# Patient Record
Sex: FEMALE | Race: WHITE | NOT HISPANIC OR LATINO | URBAN - METROPOLITAN AREA
[De-identification: names, ages, dates, MRNs, and addresses within clinical notes are randomized per-mention and may not be internally consistent; named-entity substitution may affect disease eponyms.]

---

## 2018-05-23 ENCOUNTER — OUTPATIENT (OUTPATIENT)
Dept: OUTPATIENT SERVICES | Facility: HOSPITAL | Age: 73
LOS: 1 days | End: 2018-05-23
Payer: MEDICARE

## 2018-05-23 ENCOUNTER — APPOINTMENT (OUTPATIENT)
Dept: ULTRASOUND IMAGING | Facility: IMAGING CENTER | Age: 73
End: 2018-05-23
Payer: MEDICARE

## 2018-05-23 ENCOUNTER — APPOINTMENT (OUTPATIENT)
Dept: MAMMOGRAPHY | Facility: IMAGING CENTER | Age: 73
End: 2018-05-23
Payer: MEDICARE

## 2018-05-23 DIAGNOSIS — Z00.00 ENCOUNTER FOR GENERAL ADULT MEDICAL EXAMINATION WITHOUT ABNORMAL FINDINGS: ICD-10-CM

## 2018-05-23 PROCEDURE — G0279: CPT

## 2018-05-23 PROCEDURE — 77066 DX MAMMO INCL CAD BI: CPT | Mod: 26

## 2018-05-23 PROCEDURE — 76641 ULTRASOUND BREAST COMPLETE: CPT | Mod: 26,50

## 2018-05-23 PROCEDURE — 76641 ULTRASOUND BREAST COMPLETE: CPT

## 2018-05-23 PROCEDURE — 77066 DX MAMMO INCL CAD BI: CPT

## 2018-05-23 PROCEDURE — G0279: CPT | Mod: 26

## 2018-08-10 ENCOUNTER — APPOINTMENT (OUTPATIENT)
Dept: ORTHOPEDIC SURGERY | Facility: CLINIC | Age: 73
End: 2018-08-10
Payer: MEDICARE

## 2018-08-10 VITALS
BODY MASS INDEX: 28.89 KG/M2 | SYSTOLIC BLOOD PRESSURE: 104 MMHG | HEIGHT: 61.5 IN | WEIGHT: 155 LBS | HEART RATE: 88 BPM | DIASTOLIC BLOOD PRESSURE: 69 MMHG

## 2018-08-10 DIAGNOSIS — Z87.39 PERSONAL HISTORY OF OTHER DISEASES OF THE MUSCULOSKELETAL SYSTEM AND CONNECTIVE TISSUE: ICD-10-CM

## 2018-08-10 PROCEDURE — 99204 OFFICE O/P NEW MOD 45 MIN: CPT | Mod: 25

## 2018-08-10 PROCEDURE — 73562 X-RAY EXAM OF KNEE 3: CPT | Mod: RT

## 2018-08-10 PROCEDURE — 20610 DRAIN/INJ JOINT/BURSA W/O US: CPT | Mod: RT

## 2018-09-04 ENCOUNTER — APPOINTMENT (OUTPATIENT)
Dept: ORTHOPEDIC SURGERY | Facility: CLINIC | Age: 73
End: 2018-09-04
Payer: MEDICARE

## 2018-09-04 VITALS — BODY MASS INDEX: 28.89 KG/M2 | WEIGHT: 155 LBS | HEIGHT: 61.5 IN

## 2018-09-04 PROCEDURE — 73522 X-RAY EXAM HIPS BI 3-4 VIEWS: CPT

## 2018-09-04 PROCEDURE — 99215 OFFICE O/P EST HI 40 MIN: CPT

## 2018-09-20 ENCOUNTER — APPOINTMENT (OUTPATIENT)
Dept: ORTHOPEDIC SURGERY | Facility: CLINIC | Age: 73
End: 2018-09-20
Payer: MEDICARE

## 2018-09-20 VITALS
DIASTOLIC BLOOD PRESSURE: 79 MMHG | BODY MASS INDEX: 29.27 KG/M2 | SYSTOLIC BLOOD PRESSURE: 122 MMHG | WEIGHT: 155 LBS | HEART RATE: 105 BPM | HEIGHT: 61 IN

## 2018-09-20 DIAGNOSIS — M54.5 LOW BACK PAIN: ICD-10-CM

## 2018-09-20 PROCEDURE — 99214 OFFICE O/P EST MOD 30 MIN: CPT

## 2018-09-20 PROCEDURE — 72100 X-RAY EXAM L-S SPINE 2/3 VWS: CPT

## 2018-10-04 ENCOUNTER — APPOINTMENT (OUTPATIENT)
Dept: ORTHOPEDIC SURGERY | Facility: CLINIC | Age: 73
End: 2018-10-04
Payer: MEDICARE

## 2018-10-04 VITALS
DIASTOLIC BLOOD PRESSURE: 80 MMHG | BODY MASS INDEX: 28.51 KG/M2 | SYSTOLIC BLOOD PRESSURE: 127 MMHG | HEIGHT: 61 IN | HEART RATE: 81 BPM | WEIGHT: 151 LBS

## 2018-10-04 DIAGNOSIS — M16.12 UNILATERAL PRIMARY OSTEOARTHRITIS, LEFT HIP: ICD-10-CM

## 2018-10-04 PROCEDURE — 99214 OFFICE O/P EST MOD 30 MIN: CPT

## 2018-10-05 PROBLEM — M16.12 PRIMARY OSTEOARTHRITIS OF LEFT HIP: Status: ACTIVE | Noted: 2018-09-04

## 2018-10-08 ENCOUNTER — OUTPATIENT (OUTPATIENT)
Dept: OUTPATIENT SERVICES | Facility: HOSPITAL | Age: 73
LOS: 1 days | End: 2018-10-08
Payer: MEDICARE

## 2018-10-08 VITALS
RESPIRATION RATE: 14 BRPM | HEIGHT: 62 IN | OXYGEN SATURATION: 96 % | WEIGHT: 154.1 LBS | HEART RATE: 99 BPM | SYSTOLIC BLOOD PRESSURE: 125 MMHG | TEMPERATURE: 99 F | DIASTOLIC BLOOD PRESSURE: 77 MMHG

## 2018-10-08 DIAGNOSIS — M16.12 UNILATERAL PRIMARY OSTEOARTHRITIS, LEFT HIP: ICD-10-CM

## 2018-10-08 DIAGNOSIS — Z96.641 PRESENCE OF RIGHT ARTIFICIAL HIP JOINT: Chronic | ICD-10-CM

## 2018-10-08 DIAGNOSIS — G47.33 OBSTRUCTIVE SLEEP APNEA (ADULT) (PEDIATRIC): ICD-10-CM

## 2018-10-08 DIAGNOSIS — Z29.9 ENCOUNTER FOR PROPHYLACTIC MEASURES, UNSPECIFIED: ICD-10-CM

## 2018-10-08 DIAGNOSIS — Z01.818 ENCOUNTER FOR OTHER PREPROCEDURAL EXAMINATION: ICD-10-CM

## 2018-10-08 DIAGNOSIS — Z98.890 OTHER SPECIFIED POSTPROCEDURAL STATES: Chronic | ICD-10-CM

## 2018-10-08 DIAGNOSIS — Z90.49 ACQUIRED ABSENCE OF OTHER SPECIFIED PARTS OF DIGESTIVE TRACT: Chronic | ICD-10-CM

## 2018-10-08 LAB
ANION GAP SERPL CALC-SCNC: 11 MMOL/L — SIGNIFICANT CHANGE UP (ref 5–17)
BLD GP AB SCN SERPL QL: NEGATIVE — SIGNIFICANT CHANGE UP
BUN SERPL-MCNC: 9 MG/DL — SIGNIFICANT CHANGE UP (ref 7–23)
CALCIUM SERPL-MCNC: 9.3 MG/DL — SIGNIFICANT CHANGE UP (ref 8.4–10.5)
CHLORIDE SERPL-SCNC: 104 MMOL/L — SIGNIFICANT CHANGE UP (ref 96–108)
CO2 SERPL-SCNC: 26 MMOL/L — SIGNIFICANT CHANGE UP (ref 22–31)
CREAT SERPL-MCNC: 0.76 MG/DL — SIGNIFICANT CHANGE UP (ref 0.5–1.3)
GLUCOSE SERPL-MCNC: 92 MG/DL — SIGNIFICANT CHANGE UP (ref 70–99)
HCT VFR BLD CALC: 40.1 % — SIGNIFICANT CHANGE UP (ref 34.5–45)
HGB BLD-MCNC: 12.2 G/DL — SIGNIFICANT CHANGE UP (ref 11.5–15.5)
MCHC RBC-ENTMCNC: 29.1 PG — SIGNIFICANT CHANGE UP (ref 27–34)
MCHC RBC-ENTMCNC: 30.4 GM/DL — LOW (ref 32–36)
MCV RBC AUTO: 95.7 FL — SIGNIFICANT CHANGE UP (ref 80–100)
PLATELET # BLD AUTO: 385 K/UL — SIGNIFICANT CHANGE UP (ref 150–400)
POTASSIUM SERPL-MCNC: 3.8 MMOL/L — SIGNIFICANT CHANGE UP (ref 3.5–5.3)
POTASSIUM SERPL-SCNC: 3.8 MMOL/L — SIGNIFICANT CHANGE UP (ref 3.5–5.3)
RBC # BLD: 4.19 M/UL — SIGNIFICANT CHANGE UP (ref 3.8–5.2)
RBC # FLD: 13.9 % — SIGNIFICANT CHANGE UP (ref 10.3–14.5)
RH IG SCN BLD-IMP: POSITIVE — SIGNIFICANT CHANGE UP
SODIUM SERPL-SCNC: 141 MMOL/L — SIGNIFICANT CHANGE UP (ref 135–145)
WBC # BLD: 10.87 K/UL — HIGH (ref 3.8–10.5)
WBC # FLD AUTO: 10.87 K/UL — HIGH (ref 3.8–10.5)

## 2018-10-08 PROCEDURE — 86901 BLOOD TYPING SEROLOGIC RH(D): CPT

## 2018-10-08 PROCEDURE — 85027 COMPLETE CBC AUTOMATED: CPT

## 2018-10-08 PROCEDURE — 86850 RBC ANTIBODY SCREEN: CPT

## 2018-10-08 PROCEDURE — 87641 MR-STAPH DNA AMP PROBE: CPT

## 2018-10-08 PROCEDURE — 86900 BLOOD TYPING SEROLOGIC ABO: CPT

## 2018-10-08 PROCEDURE — 83036 HEMOGLOBIN GLYCOSYLATED A1C: CPT

## 2018-10-08 PROCEDURE — G0463: CPT

## 2018-10-08 PROCEDURE — 80048 BASIC METABOLIC PNL TOTAL CA: CPT

## 2018-10-08 PROCEDURE — 87640 STAPH A DNA AMP PROBE: CPT

## 2018-10-08 RX ORDER — SODIUM CHLORIDE 9 MG/ML
3 INJECTION INTRAMUSCULAR; INTRAVENOUS; SUBCUTANEOUS EVERY 8 HOURS
Qty: 0 | Refills: 0 | Status: DISCONTINUED | OUTPATIENT
Start: 2018-10-16 | End: 2018-10-16

## 2018-10-08 RX ORDER — ACETAMINOPHEN 500 MG
975 TABLET ORAL ONCE
Qty: 0 | Refills: 0 | Status: DISCONTINUED | OUTPATIENT
Start: 2018-10-16 | End: 2018-10-16

## 2018-10-08 RX ORDER — PANTOPRAZOLE SODIUM 20 MG/1
40 TABLET, DELAYED RELEASE ORAL ONCE
Qty: 0 | Refills: 0 | Status: DISCONTINUED | OUTPATIENT
Start: 2018-10-16 | End: 2018-10-16

## 2018-10-08 RX ORDER — TRAMADOL HYDROCHLORIDE 50 MG/1
50 TABLET ORAL ONCE
Qty: 0 | Refills: 0 | Status: COMPLETED | OUTPATIENT
Start: 2018-10-16 | End: 2018-10-16

## 2018-10-08 RX ORDER — LIDOCAINE HCL 20 MG/ML
0.2 VIAL (ML) INJECTION ONCE
Qty: 0 | Refills: 0 | Status: DISCONTINUED | OUTPATIENT
Start: 2018-10-16 | End: 2018-10-16

## 2018-10-08 RX ORDER — CEFAZOLIN SODIUM 1 G
2000 VIAL (EA) INJECTION ONCE
Qty: 0 | Refills: 0 | Status: DISCONTINUED | OUTPATIENT
Start: 2018-10-16 | End: 2018-10-16

## 2018-10-08 RX ORDER — GABAPENTIN 400 MG/1
100 CAPSULE ORAL ONCE
Qty: 0 | Refills: 0 | Status: DISCONTINUED | OUTPATIENT
Start: 2018-10-16 | End: 2018-10-16

## 2018-10-08 NOTE — H&P PST ADULT - NSANTHOSAYNRD_GEN_A_CORE
No. BHAKTI screening performed.  STOP BANG Legend: 0-2 = LOW Risk; 3-4 = INTERMEDIATE Risk; 5-8 = HIGH Risk Yes

## 2018-10-08 NOTE — H&P PST ADULT - NEUROLOGICAL COMMENTS
chronic neck pain with radiculopathy to BUE, left worse than right, numbness/tingling.  also c/o chronic low back pain, hips pain, see ROS

## 2018-10-08 NOTE — H&P PST ADULT - ANESTHESIA, PREVIOUS REACTION, PROFILE
Abdomen soft, non-tender and non-distended without organomegaly or masses. Normal bowel sounds. none "codeine drops my blood pressure"

## 2018-10-08 NOTE — H&P PST ADULT - ACTIVITY
prior to August, pt able to walk 3 blocks at a time, able to walk up 1 flight of stairs, activity level limited by back, neck, hips and knees pain

## 2018-10-08 NOTE — H&P PST ADULT - PMH
Cataract    GERD (gastroesophageal reflux disease)    Hiatal hernia    History of bunionectomy of both great toes    Hypothyroid    BHAKTI (obstructive sleep apnea)  wears mouth guard Cataract    Cervical spinal stenosis    GERD (gastroesophageal reflux disease)    H/O bronchitis    Hiatal hernia    History of bunionectomy of both great toes    Hypothyroid    Low back pain  chronic  BHAKTI (obstructive sleep apnea)  wears mouth guard  Seasonal allergies

## 2018-10-08 NOTE — H&P PST ADULT - SYMPTOMS
none dyspnea/chronic LU when walking up more than 1 flight of stairs chronic LU when walking up more than 1 flight of stairs, especially during allergies season, pt to see PCP for preop evaluation on Oct 9th/dyspnea

## 2018-10-08 NOTE — H&P PST ADULT - MUSCULOSKELETAL COMMENTS
see HPI gait steady see HPI, ROS, pt also c/o bilateral knees osteoarthritis- gets yearly GABY injection to right knee

## 2018-10-08 NOTE — H&P PST ADULT - ASSESSMENT

## 2018-10-08 NOTE — H&P PST ADULT - HISTORY OF PRESENT ILLNESS
Patient presents to office for some answers related to upcoming left hip replacement   Patient lecturer, Hx right hip replacement x 3 years ago by , presents to office walking on her own complaining of b/l hip pain, R>L x 6 months. insidious onset, progressively worsening over time.   Pain indicated to posterior aspect of hip around previous incision area, radiating to groin, 7/10, constant, achy and sharp at time. Reports weakness, buckling. Worsening with standing, prolonged walking. Some relief with Nsaids. Patient denies any fever, chills, swelling, trauma to area, erythema, hematomas, numbness or tingling sensation. 72 yo female..  Patient presents to office for some answers related to upcoming left hip replacement   Patient lecturer, Hx right hip replacement x 3 years ago by , presents to office walking on her own complaining of b/l hip pain, R>L x 6 months. insidious onset, progressively worsening over time.   Pain indicated to posterior aspect of hip around previous incision area, radiating to groin, 7/10, constant, achy and sharp at time. Reports weakness, buckling. Worsening with standing, prolonged walking. Some relief with Nsaids. Patient denies any fever, chills, swelling, trauma to area, erythema, hematomas, numbness or tingling sensation. 72 yo female. h/o BHAKTI (wears mouth guard), hypothyroid, s/p right THR 2015. presents to office walking on her own complaining of b/l hip pain, R>L x 6 months, symptoms worsened significantly in the past 2 months. insidious onset, progressively worsening over time.   Pain indicated to posterior aspect of hip around previous incision area, radiating to groin, 7/10, constant, achy and sharp at time. Reports weakness, buckling. Worsening with standing, prolonged walking. Some relief with Nsaids. evaluated by Dr. Mckenzie, diagnosed with left hip osteoarthritis. now presents to PST scheduled for left anterior THR surgery.

## 2018-10-09 LAB
HBA1C BLD-MCNC: 5.9 % — HIGH (ref 4–5.6)
MRSA PCR RESULT.: SIGNIFICANT CHANGE UP
S AUREUS DNA NOSE QL NAA+PROBE: SIGNIFICANT CHANGE UP

## 2018-10-16 ENCOUNTER — OUTPATIENT (OUTPATIENT)
Dept: INPATIENT UNIT | Facility: HOSPITAL | Age: 73
LOS: 1 days | End: 2018-10-16
Payer: MEDICARE

## 2018-10-16 VITALS
TEMPERATURE: 98 F | HEART RATE: 82 BPM | DIASTOLIC BLOOD PRESSURE: 80 MMHG | SYSTOLIC BLOOD PRESSURE: 126 MMHG | OXYGEN SATURATION: 97 % | WEIGHT: 154.1 LBS | RESPIRATION RATE: 18 BRPM | HEIGHT: 62 IN

## 2018-10-16 DIAGNOSIS — M16.12 UNILATERAL PRIMARY OSTEOARTHRITIS, LEFT HIP: ICD-10-CM

## 2018-10-16 DIAGNOSIS — Z98.890 OTHER SPECIFIED POSTPROCEDURAL STATES: Chronic | ICD-10-CM

## 2018-10-16 DIAGNOSIS — Z90.49 ACQUIRED ABSENCE OF OTHER SPECIFIED PARTS OF DIGESTIVE TRACT: Chronic | ICD-10-CM

## 2018-10-16 DIAGNOSIS — Z96.641 PRESENCE OF RIGHT ARTIFICIAL HIP JOINT: Chronic | ICD-10-CM

## 2018-10-16 LAB
GLUCOSE BLDC GLUCOMTR-MCNC: 105 MG/DL — HIGH (ref 70–99)
HCT VFR BLD CALC: 39 % — SIGNIFICANT CHANGE UP (ref 34.5–45)
HGB BLD-MCNC: 12.9 G/DL — SIGNIFICANT CHANGE UP (ref 11.5–15.5)
MCHC RBC-ENTMCNC: 30.6 PG — SIGNIFICANT CHANGE UP (ref 27–34)
MCHC RBC-ENTMCNC: 33.1 GM/DL — SIGNIFICANT CHANGE UP (ref 32–36)
MCV RBC AUTO: 92.5 FL — SIGNIFICANT CHANGE UP (ref 80–100)
PLATELET # BLD AUTO: 388 K/UL — SIGNIFICANT CHANGE UP (ref 150–400)
RBC # BLD: 4.22 M/UL — SIGNIFICANT CHANGE UP (ref 3.8–5.2)
RBC # FLD: 12.8 % — SIGNIFICANT CHANGE UP (ref 10.3–14.5)
RH IG SCN BLD-IMP: POSITIVE — SIGNIFICANT CHANGE UP
WBC # BLD: 7.8 K/UL — SIGNIFICANT CHANGE UP (ref 3.8–10.5)
WBC # FLD AUTO: 7.8 K/UL — SIGNIFICANT CHANGE UP (ref 3.8–10.5)

## 2018-10-16 PROCEDURE — 86900 BLOOD TYPING SEROLOGIC ABO: CPT

## 2018-10-16 PROCEDURE — 82962 GLUCOSE BLOOD TEST: CPT

## 2018-10-16 PROCEDURE — 86901 BLOOD TYPING SEROLOGIC RH(D): CPT

## 2018-10-16 PROCEDURE — 85027 COMPLETE CBC AUTOMATED: CPT

## 2018-10-16 NOTE — PROGRESS NOTE ADULT - SUBJECTIVE AND OBJECTIVE BOX
Patient reports a recent cough, no fever, in SDA. Per anesthesia, case is cancelled and will be rescheduled at a later date.

## 2018-10-31 ENCOUNTER — APPOINTMENT (OUTPATIENT)
Dept: ORTHOPEDIC SURGERY | Facility: HOSPITAL | Age: 73
End: 2018-10-31

## 2019-05-09 PROBLEM — K21.9 GASTRO-ESOPHAGEAL REFLUX DISEASE WITHOUT ESOPHAGITIS: Chronic | Status: ACTIVE | Noted: 2018-10-08

## 2019-05-09 PROBLEM — E03.9 HYPOTHYROIDISM, UNSPECIFIED: Chronic | Status: ACTIVE | Noted: 2018-10-08

## 2019-05-09 PROBLEM — J30.2 OTHER SEASONAL ALLERGIC RHINITIS: Chronic | Status: ACTIVE | Noted: 2018-10-08

## 2019-05-09 PROBLEM — Z98.890 OTHER SPECIFIED POSTPROCEDURAL STATES: Chronic | Status: ACTIVE | Noted: 2018-10-08

## 2019-05-09 PROBLEM — K44.9 DIAPHRAGMATIC HERNIA WITHOUT OBSTRUCTION OR GANGRENE: Chronic | Status: ACTIVE | Noted: 2018-10-08

## 2019-05-09 PROBLEM — H26.9 UNSPECIFIED CATARACT: Chronic | Status: ACTIVE | Noted: 2018-10-08

## 2019-05-09 PROBLEM — M54.5 LOW BACK PAIN: Chronic | Status: ACTIVE | Noted: 2018-10-08

## 2019-05-09 PROBLEM — G47.33 OBSTRUCTIVE SLEEP APNEA (ADULT) (PEDIATRIC): Chronic | Status: ACTIVE | Noted: 2018-10-08

## 2019-05-09 PROBLEM — M48.02 SPINAL STENOSIS, CERVICAL REGION: Chronic | Status: ACTIVE | Noted: 2018-10-08

## 2019-06-12 ENCOUNTER — OUTPATIENT (OUTPATIENT)
Dept: OUTPATIENT SERVICES | Facility: HOSPITAL | Age: 74
LOS: 1 days | End: 2019-06-12
Payer: MEDICARE

## 2019-06-12 ENCOUNTER — APPOINTMENT (OUTPATIENT)
Dept: ULTRASOUND IMAGING | Facility: IMAGING CENTER | Age: 74
End: 2019-06-12
Payer: MEDICARE

## 2019-06-12 ENCOUNTER — APPOINTMENT (OUTPATIENT)
Dept: MAMMOGRAPHY | Facility: IMAGING CENTER | Age: 74
End: 2019-06-12
Payer: MEDICARE

## 2019-06-12 DIAGNOSIS — Z96.641 PRESENCE OF RIGHT ARTIFICIAL HIP JOINT: Chronic | ICD-10-CM

## 2019-06-12 DIAGNOSIS — Z00.8 ENCOUNTER FOR OTHER GENERAL EXAMINATION: ICD-10-CM

## 2019-06-12 DIAGNOSIS — Z90.49 ACQUIRED ABSENCE OF OTHER SPECIFIED PARTS OF DIGESTIVE TRACT: Chronic | ICD-10-CM

## 2019-06-12 DIAGNOSIS — Z98.890 OTHER SPECIFIED POSTPROCEDURAL STATES: Chronic | ICD-10-CM

## 2019-06-12 PROCEDURE — 77063 BREAST TOMOSYNTHESIS BI: CPT | Mod: 26

## 2019-06-12 PROCEDURE — 77063 BREAST TOMOSYNTHESIS BI: CPT

## 2019-06-12 PROCEDURE — 77067 SCR MAMMO BI INCL CAD: CPT | Mod: 26

## 2019-06-12 PROCEDURE — 77067 SCR MAMMO BI INCL CAD: CPT

## 2019-06-20 ENCOUNTER — OTHER (OUTPATIENT)
Age: 74
End: 2019-06-20

## 2019-08-27 ENCOUNTER — APPOINTMENT (OUTPATIENT)
Dept: ORTHOPEDIC SURGERY | Facility: CLINIC | Age: 74
End: 2019-08-27
Payer: MEDICARE

## 2019-08-27 VITALS
WEIGHT: 150 LBS | SYSTOLIC BLOOD PRESSURE: 104 MMHG | DIASTOLIC BLOOD PRESSURE: 61 MMHG | HEART RATE: 102 BPM | HEIGHT: 61 IN | BODY MASS INDEX: 28.32 KG/M2

## 2019-08-27 DIAGNOSIS — Z96.641 PRESENCE OF RIGHT ARTIFICIAL HIP JOINT: ICD-10-CM

## 2019-08-27 DIAGNOSIS — M16.12 UNILATERAL PRIMARY OSTEOARTHRITIS, LEFT HIP: ICD-10-CM

## 2019-08-27 PROCEDURE — 20610 DRAIN/INJ JOINT/BURSA W/O US: CPT | Mod: RT

## 2019-08-27 PROCEDURE — 99214 OFFICE O/P EST MOD 30 MIN: CPT | Mod: 25

## 2019-08-27 NOTE — DISCUSSION/SUMMARY
[de-identified] : The patient at this time is having more pain in her right hip due to trochanteric bursitis she tolerated the injection well and appeared to be greatly improved.  In the future she may want to have her left hip replaced when she was going for the hip replacement she had bronchitis and it was canceled she will again discuss this with Dr. Catalan or I will see her because she wants conservative measures at this time in 2 months.

## 2019-08-27 NOTE — HISTORY OF PRESENT ILLNESS
[de-identified] : Ms. LUCY MCDANIEL is a 74 year female who presents to office complaining of bilateral knee and bilateral hip pain.\par H/o right THR by Dr. De La Cruz in 2014.\par Pain in her hips and knees are constant. They are dull/achy in nature.\par Exacerbating factors include standing and walking.\par Relieving factors include Motrin or Tylenol or Advil or Aleve.\par Denies buckling, locking, numbness, tingling, etc.\par Methods tried in past such as PT, injections, etc. haven't helped.\par All review of systems not previously stated as positive are negative.

## 2019-08-27 NOTE — PHYSICAL EXAM
[de-identified] : Constitutional - the patient is of normal build and nutrition.  The patient remains oriented to person, time, and  place.  Mood is normal. Vital signs as recorded.  The patients gait is with pain in her right and left hips but at this time she is having more pain on the right than the left.  Right hip previous total hip replacement by Dr. De La Cruz.  At the place of her pain however is directly over her greater trochanter consistent with trochanteric bursitis.. The patient has satisfactory  balance and can stand on toes and heels.\par \par The patient has no difficulty with respiration. Respiration at rest is a normal rate. The patient is not short of breath and has not become short of breath with short ambulation. There is no audible wheezing. No coughing.\par \par Skin is normal for the patient's age. There are no abnormal masses or lymph nodes which stand out in the lower extremities.\par \par Spine - deep tendon reflexes are symmetric\par \par \par UPPER EXTREMITIES \par \par Shoulders ROM  is symmetric  and the motion is satisfactory.  There is no significant shoulder pain or limitation in motion which would make using a cane or a walker difficult. Shoulder stability and  strength are satisfactory.\par \par Circulation appears satisfactory with pedal pulses present.  There is no major edema in the lower legs. No skin tenderness or increased temperature. No major varicosities.\par \par HIP EXAMINATION the abduction and abduction as well as rotation measurements were taken with the hip in flexion.\par \par Motion\par Is flexion in the right hip 130 left hip 115, abduction right hip 65 left hip 25, adduction right hip 20 left hip 0, external rotation right hip 60 left hip 20, internal rotation right hip 10 left hip 0.  She does discomfort in her left hip with motion.  Her pain however moves more on the right at the time directly over her trochanteric bursa.\par \par There is no crepitus in either hip. The alignment of the hips is normal.\par \par \par KNEE EXAMINATION\par \par Both knees her right knee is a valgus she goes from\par \par Motion\par Right Knee      from 0 to 130 degrees she has a valgus alignment she has some discomfort and over the lateral joint she has some patellofemoral crepitus she does not have a major effusion and she at this time does not have a significant Baker's cyst.\par \par Her left knee goes from 0 to 130 degrees it is of normal alignment she has good medial lateral and anterior posterior stability she does get some diffuse aching in her knee she has no difficult problem but a dull aching at this time.\par \par \par Ankle and foot examination\par Of the ankle has normal motion.  There is normal ankle stability.  The patient has no major abnormalities of the foot.\par \par \par \par  [de-identified] : And her previous x-rays she had an AP of the pelvis and lateral of her left hip her right left hip showed medial type arthritis medial joint narrowing subchondral sclerosis and what appear to be mild cam impingement her right hip is a total hip replacement which is all porous in good position no calcium is over the trochanter but this does not rule out trochanteric bursitis.\par \par X-ray of her right knee done and shows narrowing the lateral joint line the lateral arthritis.  A general valgus knee.

## 2019-08-27 NOTE — PROCEDURE
[de-identified] : Procedure Note: \par \par Anatomic Location: right  hip trochanteric bursitis\par \par Diagnosis:  hip trochanteric bursitis\par \par Procedure:  Injection of 6ccs of Marcaine 0.5% plain and Depo-Medrol 1cc (40 MG)\par \par Local Spray: Ethyl Chloride.\par \par Skin preparation with alcohol.\par \par Patient has consented for the procedure.\par \par Injection 22-gauge spinal needle  through an anterior  lateral approach.\par \par Patient tolerated the procedure well.\par \par Patient instructed to call the office if any reaction, fever, chills, increased erythema or swelling.   [phone].

## 2019-10-29 ENCOUNTER — INPATIENT (INPATIENT)
Facility: HOSPITAL | Age: 74
LOS: 2 days | Discharge: HOME | DRG: 812 | End: 2019-11-01
Attending: INTERNAL MEDICINE | Admitting: HOSPITALIST
Payer: MEDICARE

## 2019-10-29 VITALS
HEART RATE: 106 BPM | RESPIRATION RATE: 18 BRPM | DIASTOLIC BLOOD PRESSURE: 77 MMHG | WEIGHT: 154.98 LBS | OXYGEN SATURATION: 99 % | TEMPERATURE: 98 F | HEIGHT: 61.5 IN | SYSTOLIC BLOOD PRESSURE: 119 MMHG

## 2019-10-29 DIAGNOSIS — Z90.49 ACQUIRED ABSENCE OF OTHER SPECIFIED PARTS OF DIGESTIVE TRACT: Chronic | ICD-10-CM

## 2019-10-29 DIAGNOSIS — Z98.890 OTHER SPECIFIED POSTPROCEDURAL STATES: Chronic | ICD-10-CM

## 2019-10-29 DIAGNOSIS — Z96.641 PRESENCE OF RIGHT ARTIFICIAL HIP JOINT: Chronic | ICD-10-CM

## 2019-10-29 DIAGNOSIS — D64.9 ANEMIA, UNSPECIFIED: ICD-10-CM

## 2019-10-29 LAB
ALBUMIN SERPL ELPH-MCNC: 4.1 G/DL — SIGNIFICANT CHANGE UP (ref 3.3–5)
ALP SERPL-CCNC: 48 U/L — SIGNIFICANT CHANGE UP (ref 40–120)
ALT FLD-CCNC: 10 U/L — SIGNIFICANT CHANGE UP (ref 10–45)
ANION GAP SERPL CALC-SCNC: 10 MMOL/L — SIGNIFICANT CHANGE UP (ref 5–17)
APTT BLD: 25.3 SEC — LOW (ref 27.5–36.3)
AST SERPL-CCNC: 10 U/L — SIGNIFICANT CHANGE UP (ref 10–40)
BASE EXCESS BLDV CALC-SCNC: 3.1 MMOL/L — HIGH (ref -2–2)
BASOPHILS # BLD AUTO: 0.06 K/UL — SIGNIFICANT CHANGE UP (ref 0–0.2)
BASOPHILS NFR BLD AUTO: 0.6 % — SIGNIFICANT CHANGE UP (ref 0–2)
BILIRUB SERPL-MCNC: 0.1 MG/DL — LOW (ref 0.2–1.2)
BLD GP AB SCN SERPL QL: NEGATIVE — SIGNIFICANT CHANGE UP
BUN SERPL-MCNC: 19 MG/DL — SIGNIFICANT CHANGE UP (ref 7–23)
CA-I SERPL-SCNC: 1.15 MMOL/L — SIGNIFICANT CHANGE UP (ref 1.12–1.3)
CALCIUM SERPL-MCNC: 9.2 MG/DL — SIGNIFICANT CHANGE UP (ref 8.4–10.5)
CHLORIDE BLDV-SCNC: 107 MMOL/L — SIGNIFICANT CHANGE UP (ref 96–108)
CHLORIDE SERPL-SCNC: 103 MMOL/L — SIGNIFICANT CHANGE UP (ref 96–108)
CO2 BLDV-SCNC: 29 MMOL/L — SIGNIFICANT CHANGE UP (ref 22–30)
CO2 SERPL-SCNC: 27 MMOL/L — SIGNIFICANT CHANGE UP (ref 22–31)
CREAT SERPL-MCNC: 0.81 MG/DL — SIGNIFICANT CHANGE UP (ref 0.5–1.3)
EOSINOPHIL # BLD AUTO: 0.09 K/UL — SIGNIFICANT CHANGE UP (ref 0–0.5)
EOSINOPHIL NFR BLD AUTO: 0.9 % — SIGNIFICANT CHANGE UP (ref 0–6)
GAS PNL BLDV: 136 MMOL/L — SIGNIFICANT CHANGE UP (ref 135–145)
GAS PNL BLDV: SIGNIFICANT CHANGE UP
GAS PNL BLDV: SIGNIFICANT CHANGE UP
GLUCOSE BLDV-MCNC: 101 MG/DL — HIGH (ref 70–99)
GLUCOSE SERPL-MCNC: 111 MG/DL — HIGH (ref 70–99)
HCO3 BLDV-SCNC: 28 MMOL/L — SIGNIFICANT CHANGE UP (ref 21–29)
HCT VFR BLD CALC: 23.9 % — LOW (ref 34.5–45)
HCT VFR BLDA CALC: 22 % — CRITICAL LOW (ref 39–50)
HGB BLD CALC-MCNC: 6.9 G/DL — CRITICAL LOW (ref 11.5–15.5)
HGB BLD-MCNC: 6.8 G/DL — CRITICAL LOW (ref 11.5–15.5)
IMM GRANULOCYTES NFR BLD AUTO: 0.6 % — SIGNIFICANT CHANGE UP (ref 0–1.5)
INR BLD: 0.99 RATIO — SIGNIFICANT CHANGE UP (ref 0.88–1.16)
LACTATE BLDV-MCNC: 1.9 MMOL/L — SIGNIFICANT CHANGE UP (ref 0.7–2)
LYMPHOCYTES # BLD AUTO: 2.91 K/UL — SIGNIFICANT CHANGE UP (ref 1–3.3)
LYMPHOCYTES # BLD AUTO: 27.8 % — SIGNIFICANT CHANGE UP (ref 13–44)
MANUAL SMEAR VERIFICATION: SIGNIFICANT CHANGE UP
MCHC RBC-ENTMCNC: 20.9 PG — LOW (ref 27–34)
MCHC RBC-ENTMCNC: 28.5 GM/DL — LOW (ref 32–36)
MCV RBC AUTO: 73.3 FL — LOW (ref 80–100)
MONOCYTES # BLD AUTO: 0.79 K/UL — SIGNIFICANT CHANGE UP (ref 0–0.9)
MONOCYTES NFR BLD AUTO: 7.6 % — SIGNIFICANT CHANGE UP (ref 2–14)
NEUTROPHILS # BLD AUTO: 6.55 K/UL — SIGNIFICANT CHANGE UP (ref 1.8–7.4)
NEUTROPHILS NFR BLD AUTO: 62.5 % — SIGNIFICANT CHANGE UP (ref 43–77)
NRBC # BLD: 0 /100 WBCS — SIGNIFICANT CHANGE UP (ref 0–0)
OB PNL STL: NEGATIVE — SIGNIFICANT CHANGE UP
PCO2 BLDV: 47 MMHG — SIGNIFICANT CHANGE UP (ref 35–50)
PH BLDV: 7.39 — SIGNIFICANT CHANGE UP (ref 7.35–7.45)
PLAT MORPH BLD: NORMAL — SIGNIFICANT CHANGE UP
PLATELET # BLD AUTO: 479 K/UL — HIGH (ref 150–400)
PO2 BLDV: 21 MMHG — LOW (ref 25–45)
POTASSIUM BLDV-SCNC: 3.7 MMOL/L — SIGNIFICANT CHANGE UP (ref 3.5–5.3)
POTASSIUM SERPL-MCNC: 4 MMOL/L — SIGNIFICANT CHANGE UP (ref 3.5–5.3)
POTASSIUM SERPL-SCNC: 4 MMOL/L — SIGNIFICANT CHANGE UP (ref 3.5–5.3)
PROT SERPL-MCNC: 6.8 G/DL — SIGNIFICANT CHANGE UP (ref 6–8.3)
PROTHROM AB SERPL-ACNC: 11.3 SEC — SIGNIFICANT CHANGE UP (ref 10–12.9)
RBC # BLD: 3.26 M/UL — LOW (ref 3.8–5.2)
RBC # FLD: 18.2 % — HIGH (ref 10.3–14.5)
RBC BLD AUTO: SIGNIFICANT CHANGE UP
RH IG SCN BLD-IMP: POSITIVE — SIGNIFICANT CHANGE UP
SAO2 % BLDV: 24 % — LOW (ref 67–88)
SODIUM SERPL-SCNC: 140 MMOL/L — SIGNIFICANT CHANGE UP (ref 135–145)
WBC # BLD: 10.46 K/UL — SIGNIFICANT CHANGE UP (ref 3.8–10.5)
WBC # FLD AUTO: 10.46 K/UL — SIGNIFICANT CHANGE UP (ref 3.8–10.5)

## 2019-10-29 PROCEDURE — 99284 EMERGENCY DEPT VISIT MOD MDM: CPT | Mod: GC

## 2019-10-29 PROCEDURE — 71046 X-RAY EXAM CHEST 2 VIEWS: CPT | Mod: 26

## 2019-10-29 PROCEDURE — 99223 1ST HOSP IP/OBS HIGH 75: CPT

## 2019-10-29 PROCEDURE — 93010 ELECTROCARDIOGRAM REPORT: CPT

## 2019-10-29 NOTE — H&P ADULT - NSHPLABSRESULTS_GEN_ALL_CORE
Personally reviewed labs: notable for microcytic anemia Personally reviewed labs: notable for microcytic anemia    Personally reviewed CXR: no appreciable focal consolidations or masses    EKG Personally reviewed labs: notable for microcytic anemia    Personally reviewed CXR: no appreciable focal consolidations or masses    Personally reviewed EKG: SR 90 bpm QTc 437 no ST segment changes or ST segment changes

## 2019-10-29 NOTE — ED PROVIDER NOTE - CLINICAL SUMMARY MEDICAL DECISION MAKING FREE TEXT BOX
74 years old F with sx of symptomatic anemia and low Hb/hct. Negative GI w/u recently, will obtain bloodwork T/S, transfuse admission.ZR

## 2019-10-29 NOTE — H&P ADULT - NSICDXPASTMEDICALHX_GEN_ALL_CORE_FT
PAST MEDICAL HISTORY:  Cataract     Cervical spinal stenosis     GERD (gastroesophageal reflux disease)     H/O bronchitis     Hiatal hernia     History of bunionectomy of both great toes     Hypothyroid     Low back pain chronic    BHAKTI (obstructive sleep apnea) wears mouth guard    Seasonal allergies

## 2019-10-29 NOTE — ED PROVIDER NOTE - PMH
Cataract    Cervical spinal stenosis    GERD (gastroesophageal reflux disease)    H/O bronchitis    Hiatal hernia    History of bunionectomy of both great toes    Hypothyroid    Low back pain  chronic  BHAKTI (obstructive sleep apnea)  wears mouth guard  Seasonal allergies

## 2019-10-29 NOTE — H&P ADULT - NSHPPHYSICALEXAM_GEN_ALL_CORE
Vital Signs Last 24 Hrs  T(C): 36.8 (29 Oct 2019 19:31), Max: 36.8 (29 Oct 2019 19:31)  T(F): 98.3 (29 Oct 2019 19:31), Max: 98.3 (29 Oct 2019 19:31)  HR: 106 (29 Oct 2019 19:31) (106 - 106)  BP: 119/77 (29 Oct 2019 19:31) (119/77 - 119/77)  BP(mean): --  RR: 18 (29 Oct 2019 19:31) (18 - 18)  SpO2: 99% (29 Oct 2019 19:31) (99% - 99%) Vital Signs Last 24 Hrs  T(C): 36.8 (29 Oct 2019 19:31), Max: 36.8 (29 Oct 2019 19:31)  T(F): 98.3 (29 Oct 2019 19:31), Max: 98.3 (29 Oct 2019 19:31)  HR: 106 (29 Oct 2019 19:31) (106 - 106)  BP: 119/77 (29 Oct 2019 19:31) (119/77 - 119/77)  BP(mean): --  RR: 18 (29 Oct 2019 19:31) (18 - 18)  SpO2: 99% (29 Oct 2019 19:31) (99% - 99%)      PHYSICAL EXAM:  GENERAL: NAD, well-groomed, well-developed  HEAD:  Atraumatic, Normocephalic  EYES: EOMI, PERRLA, + conjunctival pallor. Sclera clear  ENMT: No tonsillar erythema, exudates, or enlargement; Moist mucous membranes, Good dentition, No lesions  NECK: Supple, No JVD  NERVOUS SYSTEM:  Alert & Oriented X3, Good concentration; Motor Strength 5/5 B/L upper and lower extremities  CHEST/LUNG: Clear to percussion bilaterally; No rales, rhonchi, or wheezing  HEART: Regular rate and rhythm +S1 S2; No murmurs, rubs, or gallops  ABDOMEN: Soft, Nontender, Nondistended; Bowel sounds present  EXTREMITIES:  2+ Peripheral Pulses, No clubbing, cyanosis, or edema  LYMPH: No lymphadenopathy noted  SKIN: Warm and dry. No rashes or lesions Vital Signs Last 24 Hrs  T(C): 36.8 (29 Oct 2019 19:31), Max: 36.8 (29 Oct 2019 19:31)  T(F): 98.3 (29 Oct 2019 19:31), Max: 98.3 (29 Oct 2019 19:31)  HR: 106 (29 Oct 2019 19:31) (106 - 106)  BP: 119/77 (29 Oct 2019 19:31) (119/77 - 119/77)  BP(mean): --  RR: 18 (29 Oct 2019 19:31) (18 - 18)  SpO2: 99% (29 Oct 2019 19:31) (99% - 99%)      PHYSICAL EXAM:  GENERAL: NAD, well-groomed, well-developed  HEAD:  Atraumatic, Normocephalic  EYES: EOMI, PERRLA, + conjunctival pallor. Sclera clear  ENMT: No tonsillar erythema, exudates, or enlargement; Moist mucous membranes, Good dentition, No lesions  NECK: Supple, No JVD  NERVOUS SYSTEM:  Alert & Oriented X3, Good concentration; Motor Strength 5/5 B/L upper and lower extremities  CHEST/LUNG: Clear to percussion bilaterally; No rales, rhonchi, or wheezing  HEART: Regular rate and rhythm +S1 S2; No murmurs, rubs, or gallops  ABDOMEN: Soft, Nontender, Nondistended; Bowel sounds present  EXTREMITIES:  2+ Peripheral Pulses, No clubbing, cyanosis, or edema  LYMPH: No lymphadenopathy noted

## 2019-10-29 NOTE — H&P ADULT - ATTENDING COMMENTS
Dr. Mundo Miranda accepted patient's case from the ED and requested in house hospitalist team to complete admission. Patient was previously unknown to me. Patient was assigned to me by hospitalist in charge. My involvement in this case consisted only of the initial history, physical and management plan. Dr. Miranda to assume care in AM and thereafter. Case discussed in detail with overnight medicine NP/GONZALO Mccall [id number]

## 2019-10-29 NOTE — H&P ADULT - NSICDXPASTSURGICALHX_GEN_ALL_CORE_FT
PAST SURGICAL HISTORY:  History of total hip replacement, right     S/P LASIK surgery left eye    Status post appendectomy

## 2019-10-29 NOTE — H&P ADULT - PROBLEM SELECTOR PLAN 1
Microcytic anemia suggestive of Iron deficiency. Patient denies family history of thalassemia.   Serum Iron and TIBC pending  Check Folate, B12, TSH if underlying deficiencies contributing to anemia  FOBT negative and no reported physical signs suggestive of active bleed  Hematology consult per primary day team

## 2019-10-29 NOTE — ED ADULT NURSE NOTE - CHIEF COMPLAINT QUOTE
sent for blood transfusion x 1mo feeling worse seen by pmd today w/ bldwk/pt is on heart monitor  abn labs h/h 6

## 2019-10-29 NOTE — H&P ADULT - ASSESSMENT
75 yo woman with PMH of Hypothyroidism, BHAKTI (utilizes mouthguard no CPAP, GERD, OA, Spinal stenosis presents per direction of PMD in setting of symptomatic anemia.

## 2019-10-29 NOTE — ED ADULT NURSE NOTE - NSIMPLEMENTINTERV_GEN_ALL_ED
Implemented All Fall Risk Interventions:  [place] to call system. Call bell, personal items and telephone within reach. Instruct patient to call for assistance. Room bathroom lighting operational. Non-slip footwear when patient is off stretcher. Physically safe environment: no spills, clutter or unnecessary equipment. Stretcher in lowest position, wheels locked, appropriate side rails in place. Provide visual cue, wrist band, yellow gown, etc. Monitor gait and stability. Monitor for mental status changes and reorient to person, place, and time. Review medications for side effects contributing to fall risk. Reinforce activity limits and safety measures with patient and family.

## 2019-10-29 NOTE — ED PROVIDER NOTE - PHYSICAL EXAMINATION
Rectal no significant hemorrhoids noted no significant stool on  blood no gross BRBPR or melena chaperone HUMBLE zuniga

## 2019-10-29 NOTE — H&P ADULT - HISTORY OF PRESENT ILLNESS
73 yo woman with PMH of Hypothyroidism, BHAKTI (utilizes mouthguard no CPAP, GERD, OA, Spinal stenosis presents per direction of PMD in setting of anemia. Patient has been experiencing general fatigue, increased shortness of breath with exertion, and dizziness in the past few days. Patient saw her cardiologist and PMD and check labs and EKG( reported to be normal). Labs were notable for anemia of 6.4 and sent in. Per patient, denies chest pain. Denies abdominal pain, melena, hematochezia, epistaxis, gum bleeding. Endorses a history of anemia and used to take iron, folate and vitamin B12 supplements. Hasn't taken supplements in months. Patient  takes other herbal supplements for management of her osteoarthritis (one of which Boswellia). 75 yo woman with PMH of Hypothyroidism, BHAKTI (utilizes mouthguard no CPAP, GERD, OA, Spinal stenosis presents per direction of PMD in setting of anemia. Patient has been experiencing general fatigue, increased shortness of breath with exertion, and dizziness in the past few days. Also endorse vague sense of intermittent palpitations. Patient saw her cardiologist and PMD and check labs and EKG( reported to be normal). Labs were notable for anemia of 6.4 and sent in. Per patient, denies chest pain. Denies abdominal pain, melena, hematochezia, epistaxis, gum bleeding. Endorses a history of anemia and used to take iron, folate and vitamin B12 supplements. Hasn't taken supplements in months. Patient  takes other herbal supplements for management of her osteoarthritis (one of which Boswellia).

## 2019-10-29 NOTE — H&P ADULT - PROBLEM SELECTOR PLAN 2
Patient without symptoms of LOC  Likely in setting of anemia  Check orthostatics  Fall risk protocol

## 2019-10-29 NOTE — H&P ADULT - NSHPREVIEWOFSYSTEMS_GEN_ALL_CORE
REVIEW OF SYSTEMS:  CONSTITUTIONAL: No fever, chills, sweats.  EYES: No visual disturbances  ENMT: No sinus or throat pain  RESPIRATORY: No cough, wheezing,  or shortness of breath at rest  CARDIOVASCULAR: No chest pain or palpitations. + Dyspnea on exertion.   GASTROINTESTINAL: No abdominal pain. No nausea, vomiting, diarrhea or constipation. No melena or hematochezia.  GENITOURINARY: No dysuria, or hematuria  NEUROLOGICAL: No headaches, loss of strength, numbness, or tremors  SKIN: No rashes or lesions   LYMPH NODES: No enlarged glands  MUSCULOSKELETAL: +Chronic back pain and knee pain from OA  HEME/LYMPH: No active bruises.   ALLERGY AND IMMUNOLOGIC: No hives or eczema

## 2019-10-29 NOTE — ED PROVIDER NOTE - OBJECTIVE STATEMENT
74 y old f with chronic back and neck pain ,osteoarthritis came in with weakness ,fatigue ,SOB on exertion and feeling like she is passing out for couple of days ,seen today by her PMD which told her that she has very low hb/hct .Pt denies any blood in stool and had neg endoscopy  and colonoscopy 5 mo ago ,Pt has strong family hs of malignancy and one of her sibling has  leukemia    PMD Umair Kaufmna

## 2019-10-29 NOTE — ED PROVIDER NOTE - CONSTITUTIONAL, MLM
normal... Very pale , awake, alert, oriented to person, place, time/situation and in no apparent distress.

## 2019-10-29 NOTE — ED ADULT TRIAGE NOTE - CHIEF COMPLAINT QUOTE
sent for blood transfusion  abn labs h/h 6 sent for blood transfusion x 1mo feeling worse seen by pmd today w/ bldwk/pt is on heart monitor  abn labs h/h 6

## 2019-10-30 ENCOUNTER — TRANSCRIPTION ENCOUNTER (OUTPATIENT)
Age: 74
End: 2019-10-30

## 2019-10-30 DIAGNOSIS — D64.9 ANEMIA, UNSPECIFIED: ICD-10-CM

## 2019-10-30 DIAGNOSIS — R55 SYNCOPE AND COLLAPSE: ICD-10-CM

## 2019-10-30 DIAGNOSIS — E03.9 HYPOTHYROIDISM, UNSPECIFIED: ICD-10-CM

## 2019-10-30 DIAGNOSIS — Z29.9 ENCOUNTER FOR PROPHYLACTIC MEASURES, UNSPECIFIED: ICD-10-CM

## 2019-10-30 LAB
ANION GAP SERPL CALC-SCNC: 11 MMOL/L — SIGNIFICANT CHANGE UP (ref 5–17)
BASOPHILS # BLD AUTO: 0.06 K/UL — SIGNIFICANT CHANGE UP (ref 0–0.2)
BASOPHILS NFR BLD AUTO: 0.8 % — SIGNIFICANT CHANGE UP (ref 0–2)
BUN SERPL-MCNC: 17 MG/DL — SIGNIFICANT CHANGE UP (ref 7–23)
CALCIUM SERPL-MCNC: 8.3 MG/DL — LOW (ref 8.4–10.5)
CHLORIDE SERPL-SCNC: 109 MMOL/L — HIGH (ref 96–108)
CO2 SERPL-SCNC: 22 MMOL/L — SIGNIFICANT CHANGE UP (ref 22–31)
CREAT SERPL-MCNC: 0.79 MG/DL — SIGNIFICANT CHANGE UP (ref 0.5–1.3)
EOSINOPHIL # BLD AUTO: 0.1 K/UL — SIGNIFICANT CHANGE UP (ref 0–0.5)
EOSINOPHIL NFR BLD AUTO: 1.3 % — SIGNIFICANT CHANGE UP (ref 0–6)
FERRITIN SERPL-MCNC: 8 NG/ML — LOW (ref 15–150)
FOLATE SERPL-MCNC: 14 NG/ML — SIGNIFICANT CHANGE UP
GLUCOSE SERPL-MCNC: 103 MG/DL — HIGH (ref 70–99)
HCT VFR BLD CALC: 29.3 % — LOW (ref 34.5–45)
HCT VFR BLD CALC: 29.5 % — LOW (ref 34.5–45)
HCV AB S/CO SERPL IA: 0.13 S/CO — SIGNIFICANT CHANGE UP (ref 0–0.99)
HCV AB SERPL-IMP: SIGNIFICANT CHANGE UP
HGB BLD-MCNC: 8.6 G/DL — LOW (ref 11.5–15.5)
HGB BLD-MCNC: 8.6 G/DL — LOW (ref 11.5–15.5)
IMM GRANULOCYTES NFR BLD AUTO: 0.5 % — SIGNIFICANT CHANGE UP (ref 0–1.5)
IRON SATN MFR SERPL: 10 UG/DL — LOW (ref 30–160)
LYMPHOCYTES # BLD AUTO: 2.12 K/UL — SIGNIFICANT CHANGE UP (ref 1–3.3)
LYMPHOCYTES # BLD AUTO: 27.2 % — SIGNIFICANT CHANGE UP (ref 13–44)
MAGNESIUM SERPL-MCNC: 2 MG/DL — SIGNIFICANT CHANGE UP (ref 1.6–2.6)
MCHC RBC-ENTMCNC: 22.7 PG — LOW (ref 27–34)
MCHC RBC-ENTMCNC: 22.8 PG — LOW (ref 27–34)
MCHC RBC-ENTMCNC: 29.2 GM/DL — LOW (ref 32–36)
MCHC RBC-ENTMCNC: 29.4 GM/DL — LOW (ref 32–36)
MCV RBC AUTO: 77.7 FL — LOW (ref 80–100)
MCV RBC AUTO: 77.8 FL — LOW (ref 80–100)
MONOCYTES # BLD AUTO: 0.7 K/UL — SIGNIFICANT CHANGE UP (ref 0–0.9)
MONOCYTES NFR BLD AUTO: 9 % — SIGNIFICANT CHANGE UP (ref 2–14)
NEUTROPHILS # BLD AUTO: 4.76 K/UL — SIGNIFICANT CHANGE UP (ref 1.8–7.4)
NEUTROPHILS NFR BLD AUTO: 61.2 % — SIGNIFICANT CHANGE UP (ref 43–77)
NRBC # BLD: 0 /100 WBCS — SIGNIFICANT CHANGE UP (ref 0–0)
NRBC # BLD: 0 /100 WBCS — SIGNIFICANT CHANGE UP (ref 0–0)
PHOSPHATE SERPL-MCNC: 3.3 MG/DL — SIGNIFICANT CHANGE UP (ref 2.5–4.5)
PLATELET # BLD AUTO: 371 K/UL — SIGNIFICANT CHANGE UP (ref 150–400)
PLATELET # BLD AUTO: 389 K/UL — SIGNIFICANT CHANGE UP (ref 150–400)
POTASSIUM SERPL-MCNC: 4 MMOL/L — SIGNIFICANT CHANGE UP (ref 3.5–5.3)
POTASSIUM SERPL-SCNC: 4 MMOL/L — SIGNIFICANT CHANGE UP (ref 3.5–5.3)
RBC # BLD: 3.77 M/UL — LOW (ref 3.8–5.2)
RBC # BLD: 3.79 M/UL — LOW (ref 3.8–5.2)
RBC # FLD: 19.1 % — HIGH (ref 10.3–14.5)
RBC # FLD: 19.3 % — HIGH (ref 10.3–14.5)
SODIUM SERPL-SCNC: 142 MMOL/L — SIGNIFICANT CHANGE UP (ref 135–145)
TSH SERPL-MCNC: 8.48 UIU/ML — HIGH (ref 0.27–4.2)
VIT B12 SERPL-MCNC: 639 PG/ML — SIGNIFICANT CHANGE UP (ref 232–1245)
WBC # BLD: 7.78 K/UL — SIGNIFICANT CHANGE UP (ref 3.8–10.5)
WBC # BLD: 9.54 K/UL — SIGNIFICANT CHANGE UP (ref 3.8–10.5)
WBC # FLD AUTO: 7.78 K/UL — SIGNIFICANT CHANGE UP (ref 3.8–10.5)
WBC # FLD AUTO: 9.54 K/UL — SIGNIFICANT CHANGE UP (ref 3.8–10.5)

## 2019-10-30 PROCEDURE — 99223 1ST HOSP IP/OBS HIGH 75: CPT | Mod: GC

## 2019-10-30 PROCEDURE — 71250 CT THORAX DX C-: CPT | Mod: 26

## 2019-10-30 PROCEDURE — 74176 CT ABD & PELVIS W/O CONTRAST: CPT | Mod: 26

## 2019-10-30 RX ORDER — PANTOPRAZOLE SODIUM 20 MG/1
40 TABLET, DELAYED RELEASE ORAL ONCE
Refills: 0 | Status: COMPLETED | OUTPATIENT
Start: 2019-10-30 | End: 2019-10-30

## 2019-10-30 RX ORDER — LEVOTHYROXINE SODIUM 125 MCG
50 TABLET ORAL DAILY
Refills: 0 | Status: DISCONTINUED | OUTPATIENT
Start: 2019-10-30 | End: 2019-11-01

## 2019-10-30 RX ORDER — PANTOPRAZOLE SODIUM 20 MG/1
40 TABLET, DELAYED RELEASE ORAL
Refills: 0 | Status: DISCONTINUED | OUTPATIENT
Start: 2019-10-30 | End: 2019-10-30

## 2019-10-30 RX ORDER — ACETAMINOPHEN 500 MG
650 TABLET ORAL EVERY 6 HOURS
Refills: 0 | Status: DISCONTINUED | OUTPATIENT
Start: 2019-10-30 | End: 2019-11-01

## 2019-10-30 RX ORDER — SENNA PLUS 8.6 MG/1
1 TABLET ORAL
Qty: 0 | Refills: 0 | DISCHARGE

## 2019-10-30 RX ORDER — PANTOPRAZOLE SODIUM 20 MG/1
40 TABLET, DELAYED RELEASE ORAL
Refills: 0 | Status: DISCONTINUED | OUTPATIENT
Start: 2019-10-30 | End: 2019-11-01

## 2019-10-30 RX ORDER — FEXOFENADINE HCL 30 MG
1 TABLET ORAL
Qty: 0 | Refills: 0 | DISCHARGE

## 2019-10-30 RX ORDER — ASCORBIC ACID 60 MG
1 TABLET,CHEWABLE ORAL
Qty: 0 | Refills: 0 | DISCHARGE

## 2019-10-30 RX ORDER — LEVOTHYROXINE SODIUM 125 MCG
1 TABLET ORAL
Qty: 0 | Refills: 0 | DISCHARGE

## 2019-10-30 RX ORDER — ONDANSETRON 8 MG/1
4 TABLET, FILM COATED ORAL ONCE
Refills: 0 | Status: COMPLETED | OUTPATIENT
Start: 2019-10-30 | End: 2019-10-30

## 2019-10-30 RX ADMIN — Medication 1 DROP(S): at 09:19

## 2019-10-30 RX ADMIN — PANTOPRAZOLE SODIUM 40 MILLIGRAM(S): 20 TABLET, DELAYED RELEASE ORAL at 09:19

## 2019-10-30 RX ADMIN — PANTOPRAZOLE SODIUM 40 MILLIGRAM(S): 20 TABLET, DELAYED RELEASE ORAL at 18:15

## 2019-10-30 RX ADMIN — PANTOPRAZOLE SODIUM 40 MILLIGRAM(S): 20 TABLET, DELAYED RELEASE ORAL at 01:38

## 2019-10-30 RX ADMIN — Medication 50 MICROGRAM(S): at 07:04

## 2019-10-30 NOTE — CONSULT NOTE ADULT - ASSESSMENT
patient admitted with symptomatic anemia.  S/p PRBC.  MCV is microcytic.  ferritin is low.  She has iron deficiency.  etiology unclear.  Stool occult blood negative.  For GI evaluation.  F/U CT results.  Hgb adequate today no need for further PRBC.  May give iv iron tomorrow if patient agrees.  No B12 or folate deficiency.  Check LDH and haptoglobin to check for hemolysis, but unlikely with normal bilirubin.    Check CRYSTAL.    She had a thrombocytosis and that is better and was reactive.

## 2019-10-30 NOTE — CONSULT NOTE ADULT - SUBJECTIVE AND OBJECTIVE BOX
Chief Complaint:  Patient is a 74y old  Female who presents with a chief complaint of shortness of breath/near syncope (30 Oct 2019 14:28)      HPI: Patient here with anemia and asked to evaluate.  She id admitted with fatigue etc.  Hgb was below 7 and she received 2 units of PRBC and Hgb higher and she is feeling better.  She reports a history of anemia and she took oral iron in the past but she stopped several months ago.  She is scheduled for a colonoscopy later this wek.  CT scans done today and results are pending.  She says that she does not eat much meat and rarely eats chicken.  She denies any bleeding.      Medications:  acetaminophen   Tablet .. 650 milliGRAM(s) Oral every 6 hours PRN  levothyroxine 50 MICROGram(s) Oral daily  pantoprazole    Tablet 40 milliGRAM(s) Oral two times a day        Allergies:  contrast media (iodine-based) (Anaphylaxis)    Intolerances  codeine (Other)    FAMILY HISTORY:  FH: prostate cancer  FH: bladder cancer  FH: leukemia: brother      Social history: No tobacco, ETOH, or IVDA    PAST MEDICAL & SURGICAL HISTORY:  Seasonal allergies  H/O bronchitis  Cervical spinal stenosis  Low back pain: chronic  BHAKTI (obstructive sleep apnea): wears mouth guard  History of bunionectomy of both great toes  Cataract  Hiatal hernia  GERD (gastroesophageal reflux disease)  Hypothyroid  S/P LASIK surgery: left eye  History of total hip replacement, right  Status post appendectomy    REVIEW OF SYSTEMS      General: fatigue is improving.  Appetite is good.  Weight stable.  No fevers, chill.s or sweats    Skin/Breast: No rash, itch, or bruising  	  Ophthalmologic: No vision changes  	  ENMT:	 No earaches, nosebleeds, sore throat    Respiratory and Thorax: breathing is better.  No cough, wheeze, or hemoptysis  	  Cardiovascular:	No CP    Gastrointestinal:	She has constipation.  No abd pain, N/V, BRBPR    Genitourinary:	No dysuria or hematuria    Musculoskeletal:	She has chronic back pain.  No leg pain or swelling    Neurological:	No HA.  Dizziness is better.  No tingling    Vitals:  Vital Signs Last 24 Hrs  T(C): 36.7 (30 Oct 2019 11:30), Max: 36.9 (30 Oct 2019 03:45)  T(F): 98 (30 Oct 2019 11:30), Max: 98.5 (30 Oct 2019 03:45)  HR: 70 (30 Oct 2019 11:30) (70 - 106)  BP: 112/68 (30 Oct 2019 11:30) (104/64 - 119/77)  BP(mean): --  RR: 18 (30 Oct 2019 11:30) (18 - 18)  SpO2: 95% (30 Oct 2019 11:30) (94% - 99%)    Pex:  alert NAD  EOMI anicteric sclera  Neck Supple No LNA  Cv s1 S2 RRR  Lungs clear B/L  abd soft NT ND +BS  No LE edema or tenderness    Labs:                        8.6    7.78  )-----------( 371      ( 30 Oct 2019 08:36 )             29.3     CBC Full  -  ( 30 Oct 2019 08:36 )  WBC Count : 7.78 K/uL  RBC Count : 3.77 M/uL  Hemoglobin : 8.6 g/dL  Hematocrit : 29.3 %  Platelet Count - Automated : 371 K/uL  Mean Cell Volume : 77.7 fl  Mean Cell Hemoglobin : 22.8 pg  Mean Cell Hemoglobin Concentration : 29.4 gm/dL  Auto Neutrophil # : 4.76 K/uL  Auto Lymphocyte # : 2.12 K/uL  Auto Monocyte # : 0.70 K/uL  Auto Eosinophil # : 0.10 K/uL  Auto Basophil # : 0.06 K/uL  Auto Neutrophil % : 61.2 %  Auto Lymphocyte % : 27.2 %  Auto Monocyte % : 9.0 %  Auto Eosinophil % : 1.3 %  Auto Basophil % : 0.8 %    10-30    142  |  109<H>  |  17  ----------------------------<  103<H>  4.0   |  22  |  0.79    Ca    8.3<L>      30 Oct 2019 08:36  Phos  3.3     10-30  Mg     2.0     10-30    TPro  6.8  /  Alb  4.1  /  TBili  0.1<L>  /  DBili  x   /  AST  10  /  ALT  10  /  AlkPhos  48  10-29    PT/INR - ( 29 Oct 2019 21:21 )   PT: 11.3 sec;   INR: 0.99 ratio         PTT - ( 29 Oct 2019 21:21 )  PTT:25.3 sec    ferritin 8    B12 639    folate   14  [id number]

## 2019-10-30 NOTE — CONSULT NOTE ADULT - ASSESSMENT
Impression:    1. Profound iron deficiency anemia ddx includes NSAID gastropathy, Crohn's disease, angioectasias. S/p recent endoscopic evaluation.    2. GERD on Nexium, ? nausea    3. BHAKTI on CPAP    Recommendation:  -EGD/colonoscopy on Wednesday +/- Capsule endoscopy  -PPI BID Impression:    1. Profound iron deficiency anemia ddx includes NSAID gastropathy, Crohn's disease, angioectasias. S/p recent endoscopic evaluation.    2. GERD on Nexium, ? nausea    3. BHAKTI on CPAP    Recommendation:  -EGD/colonoscopy on Wednesday +/- Capsule endoscopy  -PPI BID  -clears tomorrow Impression:    1. Profound iron deficiency anemia ddx includes NSAID gastropathy, Crohn's disease, angioectasias, Dieulafoy lesions, celiac disease, GI malignancy    2. GERD on Nexium, ? nausea    3. BHAKTI on CPAP    Recommendation:  -EGD/colonoscopy +/- Capsule endoscopy on Friday (patient ate solid food already today)  -PPI BID  -clears tomorrow  -Check Transglutaminase antibody, total IgA level  -Montior CBC, transfuse as needed  -Follow-up CT abdomen/pelvis

## 2019-10-30 NOTE — PROGRESS NOTE ADULT - SUBJECTIVE AND OBJECTIVE BOX
[facility]    Subjective: Patient seen and examined. No new events except as noted.   doing well  S/P 2 units PRBC overnight  daughter at the bedside     REVIEW OF SYSTEMS:    CONSTITUTIONAL: No weakness, fevers or chills  EYES/ENT: No visual changes;  No vertigo or throat pain   NECK: No pain or stiffness  RESPIRATORY: No cough, wheezing, hemoptysis; No shortness of breath  CARDIOVASCULAR: No chest pain or palpitations  GASTROINTESTINAL: No abdominal or epigastric pain.   GENITOURINARY: No dysuria, frequency or hematuria  NEUROLOGICAL: No numbness or weakness  SKIN: No itching, burning, rashes, or lesions   All other review of systems is negative unless indicated above.    MEDICATIONS:  MEDICATIONS  (STANDING):  levothyroxine 50 MICROGram(s) Oral daily  ondansetron Injectable 4 milliGRAM(s) IV Push once  pantoprazole    Tablet 40 milliGRAM(s) Oral before breakfast      PHYSICAL EXAM:  T(C): 36.7 (10-30-19 @ 07:34), Max: 36.9 (10-30-19 @ 03:45)  HR: 78 (10-30-19 @ 07:34) (78 - 106)  BP: 110/68 (10-30-19 @ 07:34) (104/64 - 119/77)  RR: 18 (10-30-19 @ 07:34) (18 - 18)  SpO2: 95% (10-30-19 @ 07:34) (94% - 99%)  Wt(kg): --  I&O's Summary    29 Oct 2019 07:01  -  30 Oct 2019 07:00  --------------------------------------------------------  IN: 600 mL / OUT: 0 mL / NET: 600 mL      Height (cm): 157.5 (10-30 @ 00:22)  Weight (kg): 78.8 (10-30 @ 00:22)  BMI (kg/m2): 31.8 (10-30 @ 00:22)  BSA (m2): 1.8 (10-30 @ 00:22)    Appearance: Normal	  HEENT:   Normal oral mucosa, PERRL, EOMI	  Lymphatic: No lymphadenopathy , no edema  Cardiovascular: Normal S1 S2, No JVD, No murmurs , Peripheral pulses palpable 2+ bilaterally  Respiratory: Lungs clear to auscultation, normal effort 	  Gastrointestinal:  Soft, Non-tender, + BS	  Skin: No rashes, No ecchymoses, No cyanosis, warm to touch  Musculoskeletal: Normal range of motion, normal strength  Psychiatry:  Mood & affect appropriate  Ext: No edema      All labs, Imaging and EKGs personally reviewed                           8.6    7.78  )-----------( 371      ( 30 Oct 2019 08:36 )             29.3               10-30    142  |  109<H>  |  17  ----------------------------<  103<H>  4.0   |  22  |  0.79    Ca    8.3<L>      30 Oct 2019 08:36  Phos  3.3     10-30  Mg     2.0     10-30    TPro  6.8  /  Alb  4.1  /  TBili  0.1<L>  /  DBili  x   /  AST  10  /  ALT  10  /  AlkPhos  48  10-29    PT/INR - ( 29 Oct 2019 21:21 )   PT: 11.3 sec;   INR: 0.99 ratio         PTT - ( 29 Oct 2019 21:21 )  PTT:25.3 sec

## 2019-10-30 NOTE — CONSULT NOTE ADULT - SUBJECTIVE AND OBJECTIVE BOX
Chief Complaint:  Patient is a 74y old  Female who presents with a chief complaint of shortness of breath/near syncope (29 Oct 2019 23:42)      HPI:  This is a 74 year old female with a hx of GERD, hypothyroid, BHAKTI not on CPAP who was sent in by her PMD For anemia to a hgb of 6. Patient has been having fatigue and LU for for a few days. She endorses a few weeks of intermittent periumbilical cramping as well as GERD symptoms, intermittent nausea for which she takes esomeprazole intermittently. She denies diarrhea or weight loss. She uses aspirin but denies NSAID use.  SHe had an EGD colonoscopy and capsule endoscopy with Dr. Crowell in July which showed an esophageal ulcer and R colon/ileal ulcers but she was not started on treatment. At that time her hgb was approximately 11.  Her sister has UC s/p colectomy  She has a prior history of anemia on iron.    Allergies:  codeine (Other)  contrast media (iodine-based) (Anaphylaxis)      Home Medications:    Hospital Medications:  acetaminophen   Tablet .. 650 milliGRAM(s) Oral every 6 hours PRN  levothyroxine 50 MICROGram(s) Oral daily  ondansetron Injectable 4 milliGRAM(s) IV Push once  pantoprazole    Tablet 40 milliGRAM(s) Oral before breakfast      PMHX/PSHX:  Seasonal allergies  H/O bronchitis  Cervical spinal stenosis  Low back pain  BHAKTI (obstructive sleep apnea)  History of bunionectomy of both great toes  Cataract  Hiatal hernia  GERD (gastroesophageal reflux disease)  Hypothyroid  S/P LASIK surgery  History of total hip replacement, right  Status post appendectomy      Family history:  FH: prostate cancer  FH: bladder cancer  FH: leukemia      Social History:     ROS:     General:  No wt loss, fevers, chills, night sweats, fatigue,   Eyes:  Good vision, no reported pain  ENT:  No sore throat, pain, runny nose, dysphagia  CV:  No pain, palpitations, hypo/hypertension  Resp:  No dyspnea, cough, tachypnea, wheezing  GI:  See HPI  :  No pain, bleeding, incontinence, nocturia  Muscle:  No pain, weakness  Neuro:  No weakness, tingling, memory problems  Psych:  No fatigue, insomnia, mood problems, depression  Endocrine:  No polyuria, polydipsia, cold/heat intolerance  Heme:  No petechiae, ecchymosis, easy bruisability  Skin:  No rash, edema      PHYSICAL EXAM:     GENERAL:  Appears stated age, well-groomed, well-nourished, no distress  HEENT:  NC/AT,  conjunctivae clear and pink,  no JVD  CHEST:  Full & symmetric excursion, no increased effort, breath sounds clear  HEART:  Regular rhythm, S1, S2, no murmur/rub/S3/S4, no abdominal bruit, no edema  ABDOMEN:  Soft, non-tender, non-distended, normoactive bowel sounds,  no masses , no hepatosplenomegaly  EXTREMITIES:  no cyanosis,clubbing or edema  SKIN:  No rash/erythema/ecchymoses/petechiae/wounds/abscess/warm/dry  NEURO:  Alert, oriented    Vital Signs:  Vital Signs Last 24 Hrs  T(C): 36.7 (30 Oct 2019 07:34), Max: 36.9 (30 Oct 2019 03:45)  T(F): 98.1 (30 Oct 2019 07:34), Max: 98.5 (30 Oct 2019 03:45)  HR: 78 (30 Oct 2019 07:34) (78 - 106)  BP: 110/68 (30 Oct 2019 07:34) (104/64 - 119/77)  BP(mean): --  RR: 18 (30 Oct 2019 07:34) (18 - 18)  SpO2: 95% (30 Oct 2019 07:34) (94% - 99%)  Daily Height in cm: 157.48 (30 Oct 2019 00:22)    Daily     LABS:                        8.6    7.78  )-----------( 371      ( 30 Oct 2019 08:36 )             29.3     10-30    142  |  109<H>  |  17  ----------------------------<  103<H>  4.0   |  22  |  0.79    Ca    8.3<L>      30 Oct 2019 08:36  Phos  3.3     10-30  Mg     2.0     10-30    TPro  6.8  /  Alb  4.1  /  TBili  0.1<L>  /  DBili  x   /  AST  10  /  ALT  10  /  AlkPhos  48  10-29    LIVER FUNCTIONS - ( 29 Oct 2019 21:21 )  Alb: 4.1 g/dL / Pro: 6.8 g/dL / ALK PHOS: 48 U/L / ALT: 10 U/L / AST: 10 U/L / GGT: x           PT/INR - ( 29 Oct 2019 21:21 )   PT: 11.3 sec;   INR: 0.99 ratio         PTT - ( 29 Oct 2019 21:21 )  PTT:25.3 sec        Imaging: Chief Complaint:  Patient is a 74y old  Female who presents with a chief complaint of shortness of breath/near syncope (29 Oct 2019 23:42)      HPI:  This is a 74 year old female with a hx of GERD, hypothyroid, BHAKTI not on CPAP who was sent in by her PMD For anemia to a hgb of 6. Patient has been having fatigue and LU for for a few days. She endorses a few weeks of intermittent periumbilical cramping as well as GERD symptoms, intermittent nausea for which she takes esomeprazole intermittently. She denies diarrhea or weight loss. She uses aspirin but denies NSAID use.  SHe had an EGD colonoscopy and capsule endoscopy with Dr. Crowell in July which showed an esophageal ulcer and R colon/ileal ulcers but she was not started on treatment. At that time her hgb was approximately 11. She has not seen and melena or hematochezia in her stool.  Her sister has UC s/p colectomy  She has a prior history of anemia on iron.    Allergies:  codeine (Other)  contrast media (iodine-based) (Anaphylaxis)      Home Medications:    Hospital Medications:  acetaminophen   Tablet .. 650 milliGRAM(s) Oral every 6 hours PRN  levothyroxine 50 MICROGram(s) Oral daily  ondansetron Injectable 4 milliGRAM(s) IV Push once  pantoprazole    Tablet 40 milliGRAM(s) Oral before breakfast      PMHX/PSHX:  Seasonal allergies  H/O bronchitis  Cervical spinal stenosis  Low back pain  BHAKTI (obstructive sleep apnea)  History of bunionectomy of both great toes  Cataract  Hiatal hernia  GERD (gastroesophageal reflux disease)  Hypothyroid  S/P LASIK surgery  History of total hip replacement, right  Status post appendectomy      Family history:  FH: prostate cancer  FH: bladder cancer  FH: leukemia      Social History: No illicit drugs or ETOH    ROS:     General:  No wt loss, fevers, chills, night sweats, fatigue,   Eyes:  Good vision, no reported pain  ENT:  No sore throat, pain, runny nose, dysphagia  CV:  No pain, palpitations, hypo/hypertension  Resp:  No dyspnea, cough, tachypnea, wheezing  GI:  See HPI  :  No pain, bleeding, incontinence, nocturia  Muscle:  No pain, weakness  Neuro:  No weakness, tingling, memory problems  Psych:  No fatigue, insomnia, mood problems, depression  Endocrine:  No polyuria, polydipsia, cold/heat intolerance  Heme:  No petechiae, ecchymosis, easy bruisability  Skin:  No rash, edema      PHYSICAL EXAM:     GENERAL:  Appears stated age, well-groomed, well-nourished, no distress  HEENT:  NC/AT,  conjunctivae clear and pink,  no JVD  CHEST:  Full & symmetric excursion, no increased effort, breath sounds clear  HEART:  Regular rhythm, S1, S2, no murmur  ABDOMEN:  Soft, non-tender, non-distended, normoactive bowel sounds,  no masses , no hepatosplenomegaly  EXTREMITIES:  no cyanosis,clubbing or edema  SKIN:  No rash/erythema  NEURO:  Alert, oriented    Vital Signs:  Vital Signs Last 24 Hrs  T(C): 36.7 (30 Oct 2019 07:34), Max: 36.9 (30 Oct 2019 03:45)  T(F): 98.1 (30 Oct 2019 07:34), Max: 98.5 (30 Oct 2019 03:45)  HR: 78 (30 Oct 2019 07:34) (78 - 106)  BP: 110/68 (30 Oct 2019 07:34) (104/64 - 119/77)  BP(mean): --  RR: 18 (30 Oct 2019 07:34) (18 - 18)  SpO2: 95% (30 Oct 2019 07:34) (94% - 99%)  Daily Height in cm: 157.48 (30 Oct 2019 00:22)    Daily     LABS:                        8.6    7.78  )-----------( 371      ( 30 Oct 2019 08:36 )             29.3     10-30    142  |  109<H>  |  17  ----------------------------<  103<H>  4.0   |  22  |  0.79    Ca    8.3<L>      30 Oct 2019 08:36  Phos  3.3     10-30  Mg     2.0     10-30    TPro  6.8  /  Alb  4.1  /  TBili  0.1<L>  /  DBili  x   /  AST  10  /  ALT  10  /  AlkPhos  48  10-29    LIVER FUNCTIONS - ( 29 Oct 2019 21:21 )  Alb: 4.1 g/dL / Pro: 6.8 g/dL / ALK PHOS: 48 U/L / ALT: 10 U/L / AST: 10 U/L / GGT: x           PT/INR - ( 29 Oct 2019 21:21 )   PT: 11.3 sec;   INR: 0.99 ratio         PTT - ( 29 Oct 2019 21:21 )  PTT:25.3 sec

## 2019-10-30 NOTE — DISCHARGE NOTE NURSING/CASE MANAGEMENT/SOCIAL WORK - PATIENT PORTAL LINK FT
You can access the FollowMyHealth Patient Portal offered by [facility] by registering at the following website: [email or web address]. By joining [facility]’s FollowMyHealth portal, you will also be able to view your health information using other applications (apps) compatible with our system.

## 2019-10-31 DIAGNOSIS — D64.9 ANEMIA, UNSPECIFIED: ICD-10-CM

## 2019-10-31 DIAGNOSIS — R06.02 SHORTNESS OF BREATH: ICD-10-CM

## 2019-10-31 LAB
ANION GAP SERPL CALC-SCNC: 14 MMOL/L — SIGNIFICANT CHANGE UP (ref 5–17)
BUN SERPL-MCNC: 13 MG/DL — SIGNIFICANT CHANGE UP (ref 7–23)
CALCIUM SERPL-MCNC: 8.7 MG/DL — SIGNIFICANT CHANGE UP (ref 8.4–10.5)
CHLORIDE SERPL-SCNC: 103 MMOL/L — SIGNIFICANT CHANGE UP (ref 96–108)
CO2 SERPL-SCNC: 24 MMOL/L — SIGNIFICANT CHANGE UP (ref 22–31)
CREAT SERPL-MCNC: 0.8 MG/DL — SIGNIFICANT CHANGE UP (ref 0.5–1.3)
GLUCOSE SERPL-MCNC: 96 MG/DL — SIGNIFICANT CHANGE UP (ref 70–99)
HAPTOGLOB SERPL-MCNC: 121 MG/DL — SIGNIFICANT CHANGE UP (ref 34–200)
HCT VFR BLD CALC: 33.2 % — LOW (ref 34.5–45)
HCT VFR BLD CALC: 34.4 % — LOW (ref 34.5–45)
HGB BLD-MCNC: 10.3 G/DL — LOW (ref 11.5–15.5)
HGB BLD-MCNC: 9.7 G/DL — LOW (ref 11.5–15.5)
INTERPRETATION SERPL IFE-IMP: SIGNIFICANT CHANGE UP
LDH SERPL L TO P-CCNC: 170 U/L — SIGNIFICANT CHANGE UP (ref 50–242)
MCHC RBC-ENTMCNC: 23.3 PG — LOW (ref 27–34)
MCHC RBC-ENTMCNC: 23.4 PG — LOW (ref 27–34)
MCHC RBC-ENTMCNC: 29.2 GM/DL — LOW (ref 32–36)
MCHC RBC-ENTMCNC: 29.9 GM/DL — LOW (ref 32–36)
MCV RBC AUTO: 78 FL — LOW (ref 80–100)
MCV RBC AUTO: 79.8 FL — LOW (ref 80–100)
NRBC # BLD: 0 /100 WBCS — SIGNIFICANT CHANGE UP (ref 0–0)
PLATELET # BLD AUTO: 433 K/UL — HIGH (ref 150–400)
PLATELET # BLD AUTO: 442 K/UL — HIGH (ref 150–400)
POTASSIUM SERPL-MCNC: 3.9 MMOL/L — SIGNIFICANT CHANGE UP (ref 3.5–5.3)
POTASSIUM SERPL-SCNC: 3.9 MMOL/L — SIGNIFICANT CHANGE UP (ref 3.5–5.3)
RBC # BLD: 4.16 M/UL — SIGNIFICANT CHANGE UP (ref 3.8–5.2)
RBC # BLD: 4.41 M/UL — SIGNIFICANT CHANGE UP (ref 3.8–5.2)
RBC # FLD: 19.7 % — HIGH (ref 10.3–14.5)
RBC # FLD: 20 % — HIGH (ref 10.3–14.5)
SODIUM SERPL-SCNC: 141 MMOL/L — SIGNIFICANT CHANGE UP (ref 135–145)
WBC # BLD: 8.5 K/UL — SIGNIFICANT CHANGE UP (ref 3.8–10.5)
WBC # BLD: 8.68 K/UL — SIGNIFICANT CHANGE UP (ref 3.8–10.5)
WBC # FLD AUTO: 8.5 K/UL — SIGNIFICANT CHANGE UP (ref 3.8–10.5)
WBC # FLD AUTO: 8.68 K/UL — SIGNIFICANT CHANGE UP (ref 3.8–10.5)

## 2019-10-31 PROCEDURE — 99232 SBSQ HOSP IP/OBS MODERATE 35: CPT | Mod: GC

## 2019-10-31 RX ORDER — IRON SUCROSE 20 MG/ML
200 INJECTION, SOLUTION INTRAVENOUS ONCE
Refills: 0 | Status: COMPLETED | OUTPATIENT
Start: 2019-10-31 | End: 2019-10-31

## 2019-10-31 RX ORDER — SOD SULF/SODIUM/NAHCO3/KCL/PEG
4000 SOLUTION, RECONSTITUTED, ORAL ORAL ONCE
Refills: 0 | Status: DISCONTINUED | OUTPATIENT
Start: 2019-10-31 | End: 2019-10-31

## 2019-10-31 RX ORDER — SOD SULF/SODIUM/NAHCO3/KCL/PEG
1000 SOLUTION, RECONSTITUTED, ORAL ORAL EVERY 4 HOURS
Refills: 0 | Status: COMPLETED | OUTPATIENT
Start: 2019-10-31 | End: 2019-10-31

## 2019-10-31 RX ADMIN — Medication 50 MICROGRAM(S): at 01:40

## 2019-10-31 RX ADMIN — Medication 1000 MILLILITER(S): at 22:32

## 2019-10-31 RX ADMIN — Medication 10 MILLIGRAM(S): at 18:11

## 2019-10-31 RX ADMIN — Medication 1000 MILLILITER(S): at 18:09

## 2019-10-31 RX ADMIN — PANTOPRAZOLE SODIUM 40 MILLIGRAM(S): 20 TABLET, DELAYED RELEASE ORAL at 18:13

## 2019-10-31 RX ADMIN — PANTOPRAZOLE SODIUM 40 MILLIGRAM(S): 20 TABLET, DELAYED RELEASE ORAL at 09:31

## 2019-10-31 RX ADMIN — Medication 10 MILLIGRAM(S): at 22:32

## 2019-10-31 RX ADMIN — IRON SUCROSE 110 MILLIGRAM(S): 20 INJECTION, SOLUTION INTRAVENOUS at 18:12

## 2019-10-31 NOTE — PROGRESS NOTE ADULT - SUBJECTIVE AND OBJECTIVE BOX
Patient is a 74y old  Female who presents with a chief complaint of shortness of breath/near syncope (31 Oct 2019 11:43)      SUBJECTIVE / OVERNIGHT EVENTS:  no events  MEDICATIONS  (STANDING):  levothyroxine 50 MICROGram(s) Oral daily  pantoprazole    Tablet 40 milliGRAM(s) Oral two times a day    MEDICATIONS  (PRN):  acetaminophen   Tablet .. 650 milliGRAM(s) Oral every 6 hours PRN Mild Pain (1 - 3), Moderate Pain (4 - 6)              PHYSICAL EXAM:  GENERAL: NAD, well-developed  HEAD:  Atraumatic, Normocephalic  EYES: EOMI, PERRLA, conjunctiva and sclera anicteric  NECK: Supple, No JVD  CHEST/LUNG: Clear to auscultation bilaterally; No wheeze  HEART: Regular rate and rhythm; No murmurs, rubs, or gallops  ABDOMEN: Soft, Nontender, Nondistended; Bowel sounds present, no hepatosplenomegaly, no rebound or guarding  EXTREMITIES:  2+ Peripheral Pulses, No clubbing, cyanosis, or edema  PSYCH: AAOx3  NEUROLOGY: non-focal, no asterixis  SKIN: No rashes or lesion    LABS:                        9.7    8.50  )-----------( 433      ( 31 Oct 2019 10:04 )             33.2     10-31    141  |  103  |  13  ----------------------------<  96  3.9   |  24  |  0.80    Ca    8.7      31 Oct 2019 07:43  Phos  3.3     10-30  Mg     2.0     10-30    TPro  6.8  /  Alb  4.1  /  TBili  0.1<L>  /  DBili  x   /  AST  10  /  ALT  10  /  AlkPhos  48  10-29    LIVER FUNCTIONS - ( 29 Oct 2019 21:21 )  Alb: 4.1 g/dL / Pro: 6.8 g/dL / ALK PHOS: 48 U/L / ALT: 10 U/L / AST: 10 U/L / GGT: x           PT/INR - ( 29 Oct 2019 21:21 )   PT: 11.3 sec;   INR: 0.99 ratio         PTT - ( 29 Oct 2019 21:21 )  PTT:25.3 sec          RADIOLOGY & ADDITIONAL TESTS: Patient is a 74y old  Female who presents with a chief complaint of shortness of breath/near syncope (31 Oct 2019 11:43)      SUBJECTIVE / OVERNIGHT EVENTS:  no events    MEDICATIONS  (STANDING):  levothyroxine 50 MICROGram(s) Oral daily  pantoprazole    Tablet 40 milliGRAM(s) Oral two times a day    MEDICATIONS  (PRN):  acetaminophen   Tablet .. 650 milliGRAM(s) Oral every 6 hours PRN Mild Pain (1 - 3), Moderate Pain (4 - 6)              PHYSICAL EXAM:  GENERAL: NAD, well-developed  HEAD:  Atraumatic, Normocephalic  EYES: EOMI, PERRLA, conjunctiva and sclera anicteric  NECK: Supple, No JVD  CHEST/LUNG: Clear to auscultation bilaterally; No wheeze  HEART: Regular rate and rhythm; No murmurs, rubs, or gallops  ABDOMEN: Soft, Nontender, Nondistended; Bowel sounds present, no hepatosplenomegaly, no rebound or guarding  EXTREMITIES:  2+ Peripheral Pulses, No clubbing, cyanosis, or edema  PSYCH: AAOx3  NEUROLOGY: non-focal, no asterixis  SKIN: No rashes or lesion    LABS:                        9.7    8.50  )-----------( 433      ( 31 Oct 2019 10:04 )             33.2     10-31    141  |  103  |  13  ----------------------------<  96  3.9   |  24  |  0.80    Ca    8.7      31 Oct 2019 07:43  Phos  3.3     10-30  Mg     2.0     10-30    TPro  6.8  /  Alb  4.1  /  TBili  0.1<L>  /  DBili  x   /  AST  10  /  ALT  10  /  AlkPhos  48  10-29    LIVER FUNCTIONS - ( 29 Oct 2019 21:21 )  Alb: 4.1 g/dL / Pro: 6.8 g/dL / ALK PHOS: 48 U/L / ALT: 10 U/L / AST: 10 U/L / GGT: x           PT/INR - ( 29 Oct 2019 21:21 )   PT: 11.3 sec;   INR: 0.99 ratio         PTT - ( 29 Oct 2019 21:21 )  PTT:25.3 sec          RADIOLOGY & ADDITIONAL TESTS:

## 2019-10-31 NOTE — CONSULT NOTE ADULT - SUBJECTIVE AND OBJECTIVE BOX
CHIEF COMPLAINT: SOB      HISTORY OF PRESENT ILLNESS:  73 yo female  presented per direction of PMD in setting of anemia. Patient has been experiencing general fatigue, increased shortness of breath with exertion, and dizziness in the past few days. Also endorse vague sense of intermittent palpitations. Patient saw her cardiologist Dr. Javier Ram and PMD for  labs and EKG( reported to be normal). Labs were notable for anemia of 6.4 and sent in. Per patient, denies chest pain. Denies abdominal pain, melena, hematochezia, epistaxis, gum bleeding. Endorses a history of anemia and used to take iron, folate and vitamin B12 supplements. Hasn't taken supplements in months. Patient  takes other herbal supplements for management of her osteoarthritis (one of which Boswellia).   No NSAIDs use.     PAST MEDICAL & SURGICAL HISTORY:  Seasonal allergies  H/O bronchitis  Cervical spinal stenosis  Low back pain: chronic  BHAKTI (obstructive sleep apnea): wears mouth guard  History of bunionectomy of both great toes  Cataract  Hiatal hernia  GERD (gastroesophageal reflux disease)  Hypothyroid  S/P LASIK surgery: left eye  History of total hip replacement, right  Status post appendectomy          MEDICATIONS:        acetaminophen   Tablet .. 650 milliGRAM(s) Oral every 6 hours PRN    pantoprazole    Tablet 40 milliGRAM(s) Oral two times a day    levothyroxine 50 MICROGram(s) Oral daily        FAMILY HISTORY:  FH: prostate cancer  FH: bladder cancer  FH: leukemia: brother      SOCIAL HISTORY:    [ ] Non-smoker  [ ] Smoker  [ ] Alcohol    Allergies    contrast media (iodine-based) (Anaphylaxis)    Intolerances    codeine (Other)  	    REVIEW OF SYSTEMS:  CONSTITUTIONAL: No fever, weight loss, + fatigue  EYES: No eye pain, visual disturbances, or discharge  ENMT:  No difficulty hearing, tinnitus, vertigo; No sinus or throat pain  NECK: No pain or stiffness  RESPIRATORY: No cough, wheezing, chills or hemoptysis; No Shortness of Breath  CARDIOVASCULAR: No chest pain, palpitations, passing out, dizziness, or leg swelling  GASTROINTESTINAL: No abdominal or epigastric pain. No nausea, vomiting, or hematemesis; No diarrhea or constipation. No melena or hematochezia.  GENITOURINARY: No dysuria, frequency, hematuria, or incontinence  NEUROLOGICAL: No headaches, memory loss, loss of strength, numbness, or tremors  SKIN: No itching, burning, rashes, or lesions   LYMPH Nodes: No enlarged glands  ENDOCRINE: No heat or cold intolerance; No hair loss  MUSCULOSKELETAL: No joint pain or swelling; No muscle, back, or extremity pain  PSYCHIATRIC: No depression, anxiety, mood swings, or difficulty sleeping  HEME/LYMPH: No easy bruising, or bleeding gums  ALLERY AND IMMUNOLOGIC: No hives or eczema	    [ ] All others negative	  [ ] Unable to obtain    PHYSICAL EXAM:  T(C): 36.6 (10-31-19 @ 06:26), Max: 36.9 (10-30-19 @ 19:45)  HR: 72 (10-31-19 @ 06:26) (72 - 93)  BP: 118/73 (10-31-19 @ 06:26) (98/62 - 118/73)  RR: 17 (10-31-19 @ 06:26) (17 - 18)  SpO2: 96% (10-31-19 @ 06:26) (95% - 96%)  Wt(kg): --  I&O's Summary      Appearance: NAD pale   HEENT:   Normal oral mucosa, PERRL, EOMI	  Lymphatic: No lymphadenopathy  Cardiovascular: Normal S1 S2, No JVD, No murmurs, No edema  Respiratory: Lungs clear to auscultation	  Psychiatry: A & O x 3, Mood & affect appropriate  Gastrointestinal:  Soft, Non-tender, + BS	  Skin: No rashes, No ecchymoses, No cyanosis	  Neurologic: Non-focal  Extremities: Normal range of motion, No clubbing, cyanosis or edema  Vascular: Peripheral pulses palpable 2+ bilaterally    TELEMETRY: 	    ECG:  	NSR, no acute ischemic stt changes   RADIOLOGY:  < from: CT Abdomen and Pelvis w/ Oral Cont (10.30.19 @ 17:29) >    EXAM:  CT ABDOMEN AND PELVIS OC                          EXAM:  CT CHEST                            PROCEDURE DATE:  10/30/2019            INTERPRETATION:  CLINICAL INFORMATION: Anemia.    COMPARISON: None.    PROCEDURE:   CT of the Chest, Abdomen and Pelvis was performed without intravenous   contrast.   Intravenous contrast: None.  Oral contrast: Positive contrast was administered.  Sagittal and coronal reformats were performed.    FINDINGS:    CHEST:     LUNGS AND LARGE AIRWAYS: Patent central airways. Mild distal airway   impaction in the lingula. Linear type atelectasis in the left lower lobe.  PLEURA: No pleural effusion.  VESSELS: Atherosclerotic changes of the aorta and coronary arteries.  HEART: Heart size is normal. No pericardialeffusion.  MEDIASTINUM AND NYASIA: No lymphadenopathy.  CHEST WALL AND LOWER NECK: Within normal limits.    ABDOMEN AND PELVIS:    LIVER: Within normal limits.  BILE DUCTS: Normal caliber.  GALLBLADDER: Within normal limits.  SPLEEN: Within normal limits.  PANCREAS: Within normal limits.  ADRENALS: Within normal limits.  KIDNEYS/URETERS: Within normal limits.    BLADDER: Within normal limits.  REPRODUCTIVE ORGANS: Uterus and adnexa within normal limits.    BOWEL: Moderate sized hiatal hernia. No bowel obstruction. Sigmoid   diverticulosis.  PERITONEUM: No ascites.  VESSELS: Within normal limits.  RETROPERITONEUM/LYMPH NODES: No lymphadenopathy.    ABDOMINAL WALL: Within normal limits.  BONES: Degenerative changes. Right hip replacement.    IMPRESSION:     No acute pathology in the chest, abdomen or pelvis.    Sigmoid diverticulosis.                        CHRISTIANO ISLAS M.D., ATTENDING RADIOLOGIST  This document has been electronically signed. Oct 31 2019  9:25AM                < end of copied text >    OTHER: 	  	  LABS:	 	    CARDIAC MARKERS:                                  9.7    8.50  )-----------( 433      ( 31 Oct 2019 10:04 )             33.2     10-31    141  |  103  |  13  ----------------------------<  96  3.9   |  24  |  0.80    Ca    8.7      31 Oct 2019 07:43  Phos  3.3     10-30  Mg     2.0     10-30    TPro  6.8  /  Alb  4.1  /  TBili  0.1<L>  /  DBili  x   /  AST  10  /  ALT  10  /  AlkPhos  48  10-29    proBNP:   Lipid Profile:   HgA1c:   TSH:

## 2019-10-31 NOTE — PROGRESS NOTE ADULT - ASSESSMENT
Patient admitted with symptomatic anemia.  She is microcytic and has iron deficiency.  B12 and folate are okay.  CT scans negative for malignancy.  She will be having a scope tomorrow.  Hgb is rising post transfusion with PRBC.  She has constipation and does not tolerate oral iron.  Will dose iv iron today and would continue as out-pt.      Haptoglobin and LDH are normal so no hemolysis. Patient admitted with symptomatic anemia.  She is microcytic and has iron deficiency.  B12 and folate are okay.  CT scans negative for malignancy.  She will be having a scope tomorrow.  Hgb is rising post transfusion with PRBC.  She has constipation and does not tolerate oral iron.  Will dose iv iron today and would continue as out-pt.      Haptoglobin and LDH are normal so no hemolysis.      Mild thrombocytosis suspected to be reactive.

## 2019-10-31 NOTE — PROGRESS NOTE ADULT - SUBJECTIVE AND OBJECTIVE BOX
[facility]    Subjective: Patient seen and examined. No new events except as noted.   doing well  Hgb stable  plan for colon and EGD tomorrow  prep as per GI     REVIEW OF SYSTEMS:    CONSTITUTIONAL: No weakness, fevers or chills  EYES/ENT: No visual changes;  No vertigo or throat pain   NECK: No pain or stiffness  RESPIRATORY: No cough, wheezing, hemoptysis; No shortness of breath  CARDIOVASCULAR: No chest pain or palpitations  GASTROINTESTINAL: No abdominal or epigastric pain. No nausea, vomiting, or hematemesis; No diarrhea or constipation. No melena or hematochezia.  GENITOURINARY: No dysuria, frequency or hematuria  NEUROLOGICAL: No numbness or weakness  SKIN: No itching, burning, rashes, or lesions   All other review of systems is negative unless indicated above.    MEDICATIONS:  MEDICATIONS  (STANDING):  bisacodyl 10 milliGRAM(s) Oral every 4 hours  iron sucrose IVPB 200 milliGRAM(s) IV Intermittent once  levothyroxine 50 MICROGram(s) Oral daily  pantoprazole    Tablet 40 milliGRAM(s) Oral two times a day  polyethylene glycol/electrolyte Solution 1000 milliLiter(s) Oral every 4 hours      PHYSICAL EXAM:  T(C): 36.5 (10-31-19 @ 12:45), Max: 36.9 (10-30-19 @ 19:45)  HR: 77 (10-31-19 @ 12:45) (72 - 93)  BP: 109/62 (10-31-19 @ 12:45) (98/62 - 118/73)  RR: 17 (10-31-19 @ 12:45) (17 - 18)  SpO2: 92% (10-31-19 @ 12:45) (92% - 96%)  Wt(kg): --  I&O's Summary    31 Oct 2019 07:01  -  31 Oct 2019 18:03  --------------------------------------------------------  IN: 240 mL / OUT: 0 mL / NET: 240 mL          Appearance: Normal	  HEENT:   Normal oral mucosa, PERRL, EOMI	  Lymphatic: No lymphadenopathy , no edema  Cardiovascular: Normal S1 S2, No JVD, No murmurs , Peripheral pulses palpable 2+ bilaterally  Respiratory: Lungs clear to auscultation, normal effort 	  Gastrointestinal:  Soft, Non-tender, + BS	  Skin: No rashes, No ecchymoses, No cyanosis, warm to touch  Musculoskeletal: Normal range of motion, normal strength  Psychiatry:  Mood & affect appropriate  Ext: No edema      All labs, Imaging and EKGs personally reviewed                             9.7    8.50  )-----------( 433      ( 31 Oct 2019 10:04 )             33.2               10-31    141  |  103  |  13  ----------------------------<  96  3.9   |  24  |  0.80    Ca    8.7      31 Oct 2019 07:43  Phos  3.3     10-30  Mg     2.0     10-30    TPro  6.8  /  Alb  4.1  /  TBili  0.1<L>  /  DBili  x   /  AST  10  /  ALT  10  /  AlkPhos  48  10-29    PT/INR - ( 29 Oct 2019 21:21 )   PT: 11.3 sec;   INR: 0.99 ratio         PTT - ( 29 Oct 2019 21:21 )  PTT:25.3 sec              < from: CT Abdomen and Pelvis w/ Oral Cont (10.30.19 @ 17:29) >  IMPRESSION:     No acute pathology in the chest, abdomen or pelvis.    Sigmoid diverticulosis.

## 2019-10-31 NOTE — PROGRESS NOTE ADULT - ASSESSMENT
Impression:    1. Profound iron deficiency anemia ddx includes NSAID gastropathy, Crohn's disease, angioectasias, Dieulafoy lesions, celiac disease, GI malignancy, celiac. CT unremarkable.    2. GERD on Nexium, ? nausea    3. BHAKTI on CPAP    Recommendation:  -EGD/colonoscopy +/- Capsule endoscopy on Friday   -PPI BID  -Check Transglutaminase antibody, total IgA level Impression:    1. Profound iron deficiency anemia ddx includes NSAID gastropathy, Crohn's disease, angioectasias, Dieulafoy lesions, celiac disease, GI malignancy, celiac. CT unremarkable.    2. GERD on Nexium, ? nausea    3. BHAKTI on CPAP    Recommendation:  -EGD/colonoscopy +/- Capsule endoscopy on Friday   -PPI BID  -Check Transglutaminase antibody, total IgA level  -Monitor blood counts

## 2019-10-31 NOTE — PROGRESS NOTE ADULT - SUBJECTIVE AND OBJECTIVE BOX
Patient is a 74y old  Female who presents with a chief complaint of shortness of breath/near syncope (31 Oct 2019 12:13)    Doing a little better today.  No N/V or abd pain.  No bleeding or dark stool.  breathing better.  No CP.  No HA or dizziness.  less fatigue.  No fevers or chills.        Medication:   acetaminophen   Tablet .. 650 milliGRAM(s) Oral every 6 hours PRN  levothyroxine 50 MICROGram(s) Oral daily  pantoprazole    Tablet 40 milliGRAM(s) Oral two times a day      Physical exam    T(C): 36.5 (10-31-19 @ 12:45), Max: 36.9 (10-30-19 @ 19:45)  HR: 77 (10-31-19 @ 12:45) (72 - 93)  BP: 109/62 (10-31-19 @ 12:45) (98/62 - 118/73)  RR: 17 (10-31-19 @ 12:45) (17 - 18)  SpO2: 92% (10-31-19 @ 12:45) (92% - 96%)  Wt(kg): --    alert NAD  EOMI anicteric sclera  Neck Supple No LNA  Cv s1 S2 RRR  Lungs clear B/L  abd soft NT ND +BS  No LE edema or tenderness    Labs                              9.7    8.50  )-----------( 433      ( 31 Oct 2019 10:04 )             33.2       10-31    141  |  103  |  13  ----------------------------<  96  3.9   |  24  |  0.80    Ca    8.7      31 Oct 2019 07:43  Phos  3.3     10-30  Mg     2.0     10-30    TPro  6.8  /  Alb  4.1  /  TBili  0.1<L>  /  DBili  x   /  AST  10  /  ALT  10  /  AlkPhos  48  10-29      LIVER FUNCTIONS - ( 29 Oct 2019 21:21 )  Alb: 4.1 g/dL / Pro: 6.8 g/dL / ALK PHOS: 48 U/L / ALT: 10 U/L / AST: 10 U/L / GGT: x             [id number]

## 2019-10-31 NOTE — CONSULT NOTE ADULT - ASSESSMENT
73 yo female with known colitis admitted with shortness of breath and near syncope in setting of anemia.

## 2019-10-31 NOTE — PROGRESS NOTE ADULT - ATTENDING COMMENTS
Advanced care planning was discussed with patient and family.  Advanced care planning forms were reviewed and discussed.  Differential diagnosis and plan of care discussed with patient after the evaluation.   Pain assessed and judicious use of narcotics when appropriate was discussed.  Importance of Fall prevention discussed.  Counseling on Smoking and Alcohol cessation was offered when appropriate.  Counseling on Diet, exercise, and medication compliance was done.
Advanced care planning was discussed with patient and family.  Advanced care planning forms were reviewed and discussed.  Differential diagnosis and plan of care discussed with patient after the evaluation.   Pain assessed and judicious use of narcotics when appropriate was discussed.  Importance of Fall prevention discussed.  Counseling on Smoking and Alcohol cessation was offered when appropriate.  Counseling on Diet, exercise, and medication compliance was done.

## 2019-11-01 ENCOUNTER — TRANSCRIPTION ENCOUNTER (OUTPATIENT)
Age: 74
End: 2019-11-01

## 2019-11-01 ENCOUNTER — RESULT REVIEW (OUTPATIENT)
Age: 74
End: 2019-11-01

## 2019-11-01 VITALS
SYSTOLIC BLOOD PRESSURE: 98 MMHG | DIASTOLIC BLOOD PRESSURE: 64 MMHG | OXYGEN SATURATION: 95 % | HEART RATE: 83 BPM | RESPIRATION RATE: 17 BRPM | TEMPERATURE: 98 F

## 2019-11-01 LAB
HCT VFR BLD CALC: 33.1 % — LOW (ref 34.5–45)
HGB BLD-MCNC: 9.5 G/DL — LOW (ref 11.5–15.5)
IGA FLD-MCNC: 189 MG/DL — SIGNIFICANT CHANGE UP (ref 84–499)
IGG FLD-MCNC: 663 MG/DL — SIGNIFICANT CHANGE UP (ref 610–1660)
IGM SERPL-MCNC: 211 MG/DL — SIGNIFICANT CHANGE UP (ref 35–242)
KAPPA LC SER QL IFE: 1.71 MG/DL — SIGNIFICANT CHANGE UP (ref 0.33–1.94)
KAPPA/LAMBDA FREE LIGHT CHAIN RATIO, SERUM: 1.12 RATIO — SIGNIFICANT CHANGE UP (ref 0.26–1.65)
LAMBDA LC SER QL IFE: 1.52 MG/DL — SIGNIFICANT CHANGE UP (ref 0.57–2.63)
MCHC RBC-ENTMCNC: 22.9 PG — LOW (ref 27–34)
MCHC RBC-ENTMCNC: 28.7 GM/DL — LOW (ref 32–36)
MCV RBC AUTO: 80 FL — SIGNIFICANT CHANGE UP (ref 80–100)
PLATELET # BLD AUTO: 442 K/UL — HIGH (ref 150–400)
RBC # BLD: 4.14 M/UL — SIGNIFICANT CHANGE UP (ref 3.8–5.2)
RBC # FLD: 20.3 % — HIGH (ref 10.3–14.5)
WBC # BLD: 7.88 K/UL — SIGNIFICANT CHANGE UP (ref 3.8–10.5)
WBC # FLD AUTO: 7.88 K/UL — SIGNIFICANT CHANGE UP (ref 3.8–10.5)

## 2019-11-01 PROCEDURE — 88312 SPECIAL STAINS GROUP 1: CPT | Mod: 26

## 2019-11-01 PROCEDURE — 45378 DIAGNOSTIC COLONOSCOPY: CPT | Mod: GC

## 2019-11-01 PROCEDURE — 44360 SMALL BOWEL ENDOSCOPY: CPT | Mod: GC

## 2019-11-01 PROCEDURE — 88305 TISSUE EXAM BY PATHOLOGIST: CPT | Mod: 26

## 2019-11-01 RX ORDER — ESOMEPRAZOLE MAGNESIUM 40 MG/1
1 CAPSULE, DELAYED RELEASE ORAL
Qty: 0 | Refills: 0 | DISCHARGE

## 2019-11-01 RX ADMIN — PANTOPRAZOLE SODIUM 40 MILLIGRAM(S): 20 TABLET, DELAYED RELEASE ORAL at 10:32

## 2019-11-01 RX ADMIN — Medication 50 MICROGRAM(S): at 02:04

## 2019-11-01 NOTE — DISCHARGE NOTE PROVIDER - PROVIDER TOKENS
PROVIDER:[TOKEN:[[id number]],FOLLOWUP:[1 week]],PROVIDER:[TOKEN:[[id number]],FOLLOWUP:[1 week]]

## 2019-11-01 NOTE — DISCHARGE NOTE PROVIDER - NSDCFUADDINST_GEN_ALL_CORE_FT
Call to schedule follow up appointments with hematology and gastroenterology within 1 week of discharge.

## 2019-11-01 NOTE — PROGRESS NOTE ADULT - REASON FOR ADMISSION
shortness of breath/near syncope

## 2019-11-01 NOTE — PROGRESS NOTE ADULT - PROBLEM SELECTOR PLAN 2
Patient without symptoms of LOC  Likely in setting of anemia   orthostatics  Fall risk protocol  Cardiology eval called   monitor electrolytes
Likely due to hypovolemia and dehydration   Stable now
Patient without symptoms of LOC  Likely in setting of anemia   orthostatics  Fall risk protocol  Cardiology eval appreciated   monitor electrolytes  check orthostatics

## 2019-11-01 NOTE — DISCHARGE NOTE PROVIDER - NSDCCPCAREPLAN_GEN_ALL_CORE_FT
PRINCIPAL DISCHARGE DIAGNOSIS  Diagnosis: Anemia  Assessment and Plan of Treatment: Received 2 units of blood  hemoglobin stable  post EGD/Colonoscopy today:   Diverticulosis in the sigmoid colon. Internal hemorrhoids.  No etiology for patient's anemia on colonoscopy. The previously described ulcers (on prior outside colonoscopy) are not detected on this exam  EGD: Large hiatus hernia with esophagitis and gastric erosions. This is a possible etiology of patient's anemia.  Continue Nexium once daily and avoid NSAIDs  Follow up with gastroenterologist for video capsule endoscopy         SECONDARY DISCHARGE DIAGNOSES  Diagnosis: Near syncope  Assessment and Plan of Treatment: Likely due to hypovolemia and dehydration secondary to anemia  Resolved    Diagnosis: Shortness of breath  Assessment and Plan of Treatment: Secondary to anemia  Resolved    Diagnosis: Hypothyroid  Assessment and Plan of Treatment: Continue Synthroid

## 2019-11-01 NOTE — DISCHARGE NOTE PROVIDER - CARE PROVIDER_API CALL
Shan Hicks)  Hematology; Internal Medicine; Medical Oncology  [address], [address]  [place] [address]  Phone: [phone]  Fax: [phone]  Follow Up Time: 1 week    Umair Crowell)  Gastroenterology; Internal Medicine  [address], [facility] [address]  [place] [address]  Phone: [phone]  Fax: [phone]  Follow Up Time: 1 week

## 2019-11-01 NOTE — PROGRESS NOTE ADULT - PROBLEM SELECTOR PLAN 3
Check TSH  Continue with synthroid for now
As above   Stable now
Check TSH  Continue with synthroid for now

## 2019-11-01 NOTE — DISCHARGE NOTE PROVIDER - HOSPITAL COURSE
75 yo female with known colitis admitted with shortness of breath and near syncope in setting of anemia.          Problem/Plan - 1:    ·  Problem: Anemia.  Plan: s/p PRBC transfusion     Follow up EGD results     Hgb stable.          Problem/Plan - 2:    ·  Problem: Near syncope.  Plan: Likely due to hypovolemia and dehydration     Stable now.         Patient admitted with symptomatic anemia.  She is microcytic and has iron deficiency.       CT scans negative for malignancy.      Follow up on GI work up. (outpt) Capsule?         Haptoglobin and LDH are normal so no hemolysis.          Mild thrombocytosis suspected to be reactive.         Outpatient follow up with Dr. Hicks         -EGD/colonoscopy +/- Capsule endoscopy on Friday (patient ate solid food already today)    -PPI BID         Diverticulosis in the sigmoid colon.                         - Internal hemorrhoids.                         - No etiology for patient's anemia on colonoscopy. The previously described                          ulcers (on prior outside colonoscopy) are not detected on this exam.    Recommendation:      - [facility].                         - Patient requesting to have small bowel evaluation as outpatient. No GI                          contraindication to hospital d/c as no active bleeding is seen and H/h is                          stable, follow up as an outpatient for video capsule endoscopy given                          previously described ileal ulcers.           - Large hiatus hernia with esophagitis and gastric erosions. This is a                          possible etiology of patient's anemia.    Recommendation:      - Await pathology results.                         - PPI once daily, avoid NSAIDs 73 yo woman with PMH of Hypothyroidism, BHAKTI (utilizes mouthguard no CPAP, GERD, OA, Spinal stenosis presents per direction of PMD in setting of symptomatic anemia.             73 yo female with known colitis admitted with shortness of breath and near syncope in setting of anemia.          Problem/Plan - 1:    ·  Problem: Anemia.  Plan: s/p PRBC transfusion     Follow up EGD results     Hgb stable.          Problem/Plan - 2:    ·  Problem: Near syncope.  Plan: Likely due to hypovolemia and dehydration     Stable now.         Patient admitted with symptomatic anemia.  She is microcytic and has iron deficiency.       CT scans negative for malignancy.      Follow up on GI work up. (outpt) Capsule?         Haptoglobin and LDH are normal so no hemolysis.          Mild thrombocytosis suspected to be reactive.         Outpatient follow up with Dr. Hicks         -EGD/colonoscopy +/- Capsule endoscopy on Friday (patient ate solid food already today)    -PPI BID         Diverticulosis in the sigmoid colon.                         - Internal hemorrhoids.                         - No etiology for patient's anemia on colonoscopy. The previously described                          ulcers (on prior outside colonoscopy) are not detected on this exam.    Recommendation:      - [facility] care.                         - Patient requesting to have small bowel evaluation as outpatient. No GI                          contraindication to hospital d/c as no active bleeding is seen and H/h is                          stable, follow up as an outpatient for video capsule endoscopy given                          previously described ileal ulcers.           - Large hiatus hernia with esophagitis and gastric erosions. This is a                          possible etiology of patient's anemia.    Recommendation:      - Await pathology results.                         - PPI once daily, avoid NSAIDs 75 yo woman with PMH of Hypothyroidism, BHAKTI (utilizes mouthguard no CPAP, GERD, OA, Spinal stenosis presents per direction of PMD in setting of symptomatic anemia.     Admitted with shortness of breath and near syncope in setting of anemia, now resolved. s/p 2 units PRBC transfusion, Hgb stable. s/p EGD/Colonoscopy today (results below),     plan to DC home on PPI and f/u with GI for out-pt capsule study.  Hematology consulted, pt is microcytic and has iron deficiency. CT scans negative for malignancy.      Haptoglobin and LDH are normal so no hemolysis.  Mild thrombocytosis suspected to be reactive. Outpatient follow up with Dr. Hicks.    Plan and meds d/w Dr. Miranda, pt cleared for discharge home.          Diverticulosis in the sigmoid colon.                         - Internal hemorrhoids.                         - No etiology for patient's anemia on colonoscopy. The previously described                          ulcers (on prior outside colonoscopy) are not detected on this exam.    Recommendation:      - [facility].                         - Patient requesting to have small bowel evaluation as outpatient. No GI                          contraindication to hospital d/c as no active bleeding is seen and H/h is                          stable, follow up as an outpatient for video capsule endoscopy given                          previously described ileal ulcers.           - Large hiatus hernia with esophagitis and gastric erosions. This is a                          possible etiology of patient's anemia.    Recommendation:      - Await pathology results.                         - PPI once daily, avoid NSAIDs

## 2019-11-01 NOTE — PROGRESS NOTE ADULT - SUBJECTIVE AND OBJECTIVE BOX
Subjective: Patient seen and examined. No new events except as noted.   s/P EGD   wants to go home   no cp or sob       REVIEW OF SYSTEMS:    CONSTITUTIONAL: + weakness, fevers or chills  EYES/ENT: No visual changes;  No vertigo or throat pain   NECK: No pain or stiffness  RESPIRATORY: No cough, wheezing, hemoptysis; No shortness of breath  CARDIOVASCULAR: No chest pain or palpitations  GASTROINTESTINAL: No abdominal or epigastric pain. No nausea, vomiting, or hematemesis; No diarrhea or constipation. No melena or hematochezia.  GENITOURINARY: No dysuria, frequency or hematuria  NEUROLOGICAL: No numbness or weakness  SKIN: No itching, burning, rashes, or lesions   All other review of systems is negative unless indicated above.    MEDICATIONS:  MEDICATIONS  (STANDING):  levothyroxine 50 MICROGram(s) Oral daily  pantoprazole    Tablet 40 milliGRAM(s) Oral two times a day      PHYSICAL EXAM:  T(C): 36.6 (11-01-19 @ 04:16), Max: 37.2 (10-31-19 @ 21:19)  HR: 87 (11-01-19 @ 04:16) (77 - 97)  BP: 100/59 (11-01-19 @ 04:16) (100/59 - 116/72)  RR: 17 (11-01-19 @ 04:16) (17 - 17)  SpO2: 92% (11-01-19 @ 04:16) (92% - 95%)  Wt(kg): --  I&O's Summary    31 Oct 2019 07:01  -  01 Nov 2019 07:00  --------------------------------------------------------  IN: 340 mL / OUT: 0 mL / NET: 340 mL          Appearance: NAD	  HEENT:   Normal oral mucosa, PERRL, EOMI	  Lymphatic: No lymphadenopathy , no edema  Cardiovascular: Normal S1 S2, No JVD, No murmurs , Peripheral pulses palpable 2+ bilaterally  Respiratory: Lungs clear to auscultation, normal effort 	  Gastrointestinal:  Soft, Non-tender, + BS	  Skin: No rashes, No ecchymoses, No cyanosis, warm to touch  Musculoskeletal: Normal range of motion, normal strength  Psychiatry:  Mood & affect appropriate  Ext: No edema      LABS:    CARDIAC MARKERS:                                9.5    7.88  )-----------( 442      ( 01 Nov 2019 09:47 )             33.1     10-31    141  |  103  |  13  ----------------------------<  96  3.9   |  24  |  0.80    Ca    8.7      31 Oct 2019 07:43      proBNP:   Lipid Profile:   HgA1c:   TSH:             TELEMETRY: 	    ECG:  	  RADIOLOGY:   DIAGNOSTIC TESTING:  [ ] Echocardiogram:  [ ]  Catheterization:  [ ] Stress Test:    OTHER:

## 2019-11-01 NOTE — PROGRESS NOTE ADULT - SUBJECTIVE AND OBJECTIVE BOX
LUCY MCDANIEL  MRN-[id number]    Patient is a 74y old  Female who presents with a chief complaint of shortness of breath/near syncope (01 Nov 2019 08:14)      Review of System    Seen at Endo.  Nena    Current Meds  MEDICATIONS  (STANDING):  levothyroxine 50 MICROGram(s) Oral daily  pantoprazole    Tablet 40 milliGRAM(s) Oral two times a day    MEDICATIONS  (PRN):  acetaminophen   Tablet .. 650 milliGRAM(s) Oral every 6 hours PRN Mild Pain (1 - 3), Moderate Pain (4 - 6)      Vitals  Vital Signs Last 24 Hrs  T(C): 36.6 (01 Nov 2019 04:16), Max: 37.2 (31 Oct 2019 21:19)  T(F): 97.8 (01 Nov 2019 04:16), Max: 99 (31 Oct 2019 21:19)  HR: 87 (01 Nov 2019 04:16) (77 - 97)  BP: 100/59 (01 Nov 2019 04:16) (100/59 - 116/72)  BP(mean): --  RR: 17 (01 Nov 2019 04:16) (17 - 17)  SpO2: 92% (01 Nov 2019 04:16) (92% - 95%)    Physical Exam     NAD    Lab  CBC Full  -  ( 31 Oct 2019 20:15 )  WBC Count : 8.68 K/uL  RBC Count : 4.41 M/uL  Hemoglobin : 10.3 g/dL  Hematocrit : 34.4 %  Platelet Count - Automated : 442 K/uL  Mean Cell Volume : 78.0 fl  Mean Cell Hemoglobin : 23.4 pg  Mean Cell Hemoglobin Concentration : 29.9 gm/dL  Auto Neutrophil # : x  Auto Lymphocyte # : x  Auto Monocyte # : x  Auto Eosinophil # : x  Auto Basophil # : x  Auto Neutrophil % : x  Auto Lymphocyte % : x  Auto Monocyte % : x  Auto Eosinophil % : x  Auto Basophil % : x    10-31    141  |  103  |  13  ----------------------------<  96  3.9   |  24  |  0.80    Ca    8.7      31 Oct 2019 07:43          Rad:    Assessment/Plan

## 2019-11-01 NOTE — DISCHARGE NOTE PROVIDER - NSDCMRMEDTOKEN_GEN_ALL_CORE_FT
Lidoderm 5% topical film: Apply topically to affected area once a day, As Needed  NexIUM 40 mg oral delayed release capsule: 1 cap(s) orally once a day, As Needed  Synthroid 50 mcg (0.05 mg) oral tablet: 1 tab(s) orally once a day Lidoderm 5% topical film: Apply topically to affected area once a day, As Needed  NexIUM 40 mg oral delayed release capsule: 1 cap(s) orally once a day  Synthroid 50 mcg (0.05 mg) oral tablet: 1 tab(s) orally once a day

## 2019-11-01 NOTE — PROGRESS NOTE ADULT - SUBJECTIVE AND OBJECTIVE BOX
Pre-Endoscopy Evaluation      Referring Physician: dr. cleveland                                   Procedure:  upper gastrointestinal endoscopy/colonoscopy    Indication for Procedure: anemia    Pertinent History: 74 year old female with PMH below including GERD, hypothyroid, BHAKTI not on CPAP who presents for profound anemia anemia     Sedation by Anesthesia [X]    PAST MEDICAL & SURGICAL HISTORY:  Seasonal allergies  H/O bronchitis  Cervical spinal stenosis  Low back pain: chronic  BHAKTI (obstructive sleep apnea): wears mouth guard  History of bunionectomy of both great toes  Cataract  Hiatal hernia  GERD (gastroesophageal reflux disease)  Hypothyroid  S/P LASIK surgery: left eye  History of total hip replacement, right  Status post appendectomy      PMH of Gastroparesis [ ]  Gastric Surgery [ ]  Gastric Outlet Obstruction [ ]    Allergies    contrast media (iodine-based) (Anaphylaxis)    Intolerances    codeine (Other)      Latex allergy: [ ] yes [X] no    Medications:MEDICATIONS  (STANDING):  levothyroxine 50 MICROGram(s) Oral daily  pantoprazole    Tablet 40 milliGRAM(s) Oral two times a day    MEDICATIONS  (PRN):  acetaminophen   Tablet .. 650 milliGRAM(s) Oral every 6 hours PRN Mild Pain (1 - 3), Moderate Pain (4 - 6)      Smoking: [ ] yes  [X] no    AICD/PPM: [ ] yes   [X] no    Pertinent lab data:                        10.3   8.68  )-----------( 442      ( 31 Oct 2019 20:15 )             34.4     10-31    141  |  103  |  13  ----------------------------<  96  3.9   |  24  |  0.80    Ca    8.7      31 Oct 2019 07:43  Phos  3.3     10-30  Mg     2.0     10-30          Physical Examination:     Daily   Vital Signs Last 24 Hrs  T(C): 36.6 (01 Nov 2019 04:16), Max: 37.2 (31 Oct 2019 21:19)  T(F): 97.8 (01 Nov 2019 04:16), Max: 99 (31 Oct 2019 21:19)  HR: 87 (01 Nov 2019 04:16) (77 - 97)  BP: 100/59 (01 Nov 2019 04:16) (100/59 - 116/72)  BP(mean): --  RR: 17 (01 Nov 2019 04:16) (17 - 17)  SpO2: 92% (01 Nov 2019 04:16) (92% - 95%)    Drug Dosing Weight  Height (cm): 157.5 (30 Oct 2019 00:22)  Weight (kg): 78.8 (30 Oct 2019 00:22)  BMI (kg/m2): 31.8 (30 Oct 2019 00:22)  BSA (m2): 1.8 (30 Oct 2019 00:22)    Constitutional: NAD     Neck:  No JVD    Respiratory: CTAB/L    Cardiovascular: S1 and S2    Gastrointestinal: BS+, soft, NT/ND    Extremities: No peripheral edema    Neurological: A/O x 3    : No Dawson    Skin: No rashes    Comments:    ASA Class: I [ ]  II [ ]  III [ ]  IV [ ]    The patient is a suitable candidate for the planned procedure unless box checked [ ]  No, explain:

## 2019-11-01 NOTE — PROGRESS NOTE ADULT - PROBLEM SELECTOR PLAN 1
Microcytic anemia suggestive of Iron deficiency. Patient denies family history of thalassemia.   Anemia W/U  FOBT negative and no reported physical signs suggestive of active bleed  GI eval appreciate, plan for EGD/Colon on friday  reg diet now, change to liquid tomorrow with prep tomorrow afternoon   CT C/A/P with oral contrast   previous EGD noted from patient GI   Hematology eval called for further recs
Microcytic anemia suggestive of Iron deficiency. Patient denies family history of thalassemia.   Anemia W/U appreciated   FOBT negative and no reported physical signs suggestive of active bleed  GI eval appreciate, plan for EGD/Colon on friday, prep as per GI  Liq diet, NPO midnight   CT C/A/P with oral contrast   previous EGD noted from patient GI   Hematology eval appreciated  IV Iron supplement
s/p PRBC transfusion   Follow up EGD results   Hgb stable

## 2019-11-01 NOTE — PROGRESS NOTE ADULT - ASSESSMENT
Patient admitted with symptomatic anemia.  She is microcytic and has iron deficiency.     CT scans negative for malignancy.    Follow up on GI work up. (outpt) Capsule?     Haptoglobin and LDH are normal so no hemolysis.      Mild thrombocytosis suspected to be reactive.     Outpatient follow up with Dr. Hicks

## 2019-11-04 LAB
TTG IGA SER-ACNC: <1.2 U/ML — SIGNIFICANT CHANGE UP
TTG IGA SER-ACNC: <1.2 U/ML — SIGNIFICANT CHANGE UP
TTG IGA SER-ACNC: NEGATIVE — SIGNIFICANT CHANGE UP
TTG IGA SER-ACNC: NEGATIVE — SIGNIFICANT CHANGE UP
TTG IGG SER IA-ACNC: NEGATIVE — SIGNIFICANT CHANGE UP
TTG IGG SER IA-ACNC: NEGATIVE — SIGNIFICANT CHANGE UP
TTG IGG SER-ACNC: <1.2 U/ML — SIGNIFICANT CHANGE UP
TTG IGG SER-ACNC: <1.2 U/ML — SIGNIFICANT CHANGE UP

## 2019-11-12 PROCEDURE — 86803 HEPATITIS C AB TEST: CPT

## 2019-11-12 PROCEDURE — 83540 ASSAY OF IRON: CPT

## 2019-11-12 PROCEDURE — 82330 ASSAY OF CALCIUM: CPT

## 2019-11-12 PROCEDURE — 86850 RBC ANTIBODY SCREEN: CPT

## 2019-11-12 PROCEDURE — 82784 ASSAY IGA/IGD/IGG/IGM EACH: CPT

## 2019-11-12 PROCEDURE — 36430 TRANSFUSION BLD/BLD COMPNT: CPT

## 2019-11-12 PROCEDURE — 83520 IMMUNOASSAY QUANT NOS NONAB: CPT

## 2019-11-12 PROCEDURE — 83735 ASSAY OF MAGNESIUM: CPT

## 2019-11-12 PROCEDURE — 71046 X-RAY EXAM CHEST 2 VIEWS: CPT

## 2019-11-12 PROCEDURE — 80048 BASIC METABOLIC PNL TOTAL CA: CPT

## 2019-11-12 PROCEDURE — 82435 ASSAY OF BLOOD CHLORIDE: CPT

## 2019-11-12 PROCEDURE — 84295 ASSAY OF SERUM SODIUM: CPT

## 2019-11-12 PROCEDURE — P9016: CPT

## 2019-11-12 PROCEDURE — 82272 OCCULT BLD FECES 1-3 TESTS: CPT

## 2019-11-12 PROCEDURE — 82803 BLOOD GASES ANY COMBINATION: CPT

## 2019-11-12 PROCEDURE — 84443 ASSAY THYROID STIM HORMONE: CPT

## 2019-11-12 PROCEDURE — 85014 HEMATOCRIT: CPT

## 2019-11-12 PROCEDURE — 93005 ELECTROCARDIOGRAM TRACING: CPT

## 2019-11-12 PROCEDURE — 71250 CT THORAX DX C-: CPT

## 2019-11-12 PROCEDURE — 82607 VITAMIN B-12: CPT

## 2019-11-12 PROCEDURE — 88312 SPECIAL STAINS GROUP 1: CPT

## 2019-11-12 PROCEDURE — 82947 ASSAY GLUCOSE BLOOD QUANT: CPT

## 2019-11-12 PROCEDURE — 85610 PROTHROMBIN TIME: CPT

## 2019-11-12 PROCEDURE — 83615 LACTATE (LD) (LDH) ENZYME: CPT

## 2019-11-12 PROCEDURE — 83010 ASSAY OF HAPTOGLOBIN QUANT: CPT

## 2019-11-12 PROCEDURE — 86900 BLOOD TYPING SEROLOGIC ABO: CPT

## 2019-11-12 PROCEDURE — 99285 EMERGENCY DEPT VISIT HI MDM: CPT | Mod: 25

## 2019-11-12 PROCEDURE — 88305 TISSUE EXAM BY PATHOLOGIST: CPT

## 2019-11-12 PROCEDURE — 84132 ASSAY OF SERUM POTASSIUM: CPT

## 2019-11-12 PROCEDURE — 80053 COMPREHEN METABOLIC PANEL: CPT

## 2019-11-12 PROCEDURE — 85027 COMPLETE CBC AUTOMATED: CPT

## 2019-11-12 PROCEDURE — 83605 ASSAY OF LACTIC ACID: CPT

## 2019-11-12 PROCEDURE — 86334 IMMUNOFIX E-PHORESIS SERUM: CPT

## 2019-11-12 PROCEDURE — 86364 TISS TRNSGLTMNASE EA IG CLAS: CPT

## 2019-11-12 PROCEDURE — 84100 ASSAY OF PHOSPHORUS: CPT

## 2019-11-12 PROCEDURE — 86901 BLOOD TYPING SEROLOGIC RH(D): CPT

## 2019-11-12 PROCEDURE — 85730 THROMBOPLASTIN TIME PARTIAL: CPT

## 2019-11-12 PROCEDURE — 82746 ASSAY OF FOLIC ACID SERUM: CPT

## 2019-11-12 PROCEDURE — 74176 CT ABD & PELVIS W/O CONTRAST: CPT

## 2019-11-12 PROCEDURE — 86923 COMPATIBILITY TEST ELECTRIC: CPT

## 2019-11-12 PROCEDURE — 82728 ASSAY OF FERRITIN: CPT

## 2020-01-07 ENCOUNTER — APPOINTMENT (OUTPATIENT)
Dept: OBGYN | Facility: CLINIC | Age: 75
End: 2020-01-07
Payer: MEDICARE

## 2020-01-07 VITALS
SYSTOLIC BLOOD PRESSURE: 110 MMHG | OXYGEN SATURATION: 98 % | DIASTOLIC BLOOD PRESSURE: 72 MMHG | WEIGHT: 155 LBS | HEIGHT: 61 IN | BODY MASS INDEX: 29.27 KG/M2 | HEART RATE: 98 BPM

## 2020-01-07 DIAGNOSIS — Z80.0 FAMILY HISTORY OF MALIGNANT NEOPLASM OF DIGESTIVE ORGANS: ICD-10-CM

## 2020-01-07 DIAGNOSIS — Z80.41 FAMILY HISTORY OF MALIGNANT NEOPLASM OF OVARY: ICD-10-CM

## 2020-01-07 DIAGNOSIS — Z78.9 OTHER SPECIFIED HEALTH STATUS: ICD-10-CM

## 2020-01-07 DIAGNOSIS — Z92.89 PERSONAL HISTORY OF OTHER MEDICAL TREATMENT: ICD-10-CM

## 2020-01-07 DIAGNOSIS — R93.89 ABNORMAL FINDINGS ON DIAGNOSTIC IMAGING OF OTHER SPECIFIED BODY STRUCTURES: ICD-10-CM

## 2020-01-07 PROCEDURE — 99203 OFFICE O/P NEW LOW 30 MIN: CPT

## 2020-01-07 NOTE — CHIEF COMPLAINT
[Initial Visit] : initial GYN visit [FreeTextEntry1] : Referred to assess thick EL--prom on MRI.CT--nl ut and adnexa--10/31 Hb 6.4--PVCP did b/w bec looed pale. w/u neg--endoscopy/colonoscopy. Transfused.Pt gyn Sindy did not do TVS\par Denies PMPB\par Disc genetics test for herediatry malignancy--sister with ca still alive and will find out if tested

## 2020-01-14 ENCOUNTER — APPOINTMENT (OUTPATIENT)
Dept: OBGYN | Facility: CLINIC | Age: 75
End: 2020-01-14
Payer: MEDICARE

## 2020-01-14 ENCOUNTER — ASOB RESULT (OUTPATIENT)
Age: 75
End: 2020-01-14

## 2020-01-14 PROCEDURE — 76830 TRANSVAGINAL US NON-OB: CPT

## 2020-01-17 ENCOUNTER — OTHER (OUTPATIENT)
Age: 75
End: 2020-01-17

## 2020-01-29 ENCOUNTER — APPOINTMENT (OUTPATIENT)
Dept: ORTHOPEDIC SURGERY | Facility: CLINIC | Age: 75
End: 2020-01-29
Payer: MEDICARE

## 2020-01-29 VITALS
SYSTOLIC BLOOD PRESSURE: 127 MMHG | HEART RATE: 93 BPM | BODY MASS INDEX: 29.27 KG/M2 | HEIGHT: 61 IN | WEIGHT: 155 LBS | DIASTOLIC BLOOD PRESSURE: 74 MMHG

## 2020-01-29 DIAGNOSIS — M84.453A PATHOLOGICAL FRACTURE, UNSPECIFIED FEMUR, INITIAL ENCOUNTER FOR FRACTURE: ICD-10-CM

## 2020-01-29 DIAGNOSIS — Z96.641 PRESENCE OF RIGHT ARTIFICIAL HIP JOINT: ICD-10-CM

## 2020-01-29 PROCEDURE — 73502 X-RAY EXAM HIP UNI 2-3 VIEWS: CPT | Mod: LT

## 2020-01-29 PROCEDURE — 72100 X-RAY EXAM L-S SPINE 2/3 VWS: CPT

## 2020-01-29 PROCEDURE — 99214 OFFICE O/P EST MOD 30 MIN: CPT

## 2020-01-29 NOTE — PHYSICAL EXAM
[Coxalgic] : coxalgic [LE] : 5/5 motor strength in bilateral lower extremities [Knee] : patellar 2+ and symmetric bilaterally [PT] : posterior tibial 2+ and symmetric bilaterally [Ankle] : ankle 2+ and symmetric bilaterally [DP] : dorsalis pedis 2+ and symmetric bilaterally [de-identified] : On general examination the patient is adequately groomed and nourished. The vital parameters are as recorded. \par There is no lymphedema or diffuse swelling, no varicose veins, no skin warmth/erythema/scars/swelling, no ulcers and no palpable lymph nodes or masses in both lower extremities. Bilateral pedal pulses are well palpable.\par Upper Extremity:\par Both right and left upper extremities are unremarkable in terms of skin rash, lesions, pigmentation, redness, tenderness, swelling, joint instability, abnormal deformity or crepitus. The overall range of motion, sensation, motor tone and strength testing are normal.\par \par Hip Exam:\par The gait is left stiff hip coxalgic.\par The patient has unequal leg lengths - left lower limb shortening of 0.5 cm and no pelvic tilt. \par Kristopher/David test is 6 inches on the right and 10 inches on the left. \par Active SLR is 60 degrees on the right and 40 degrees on the left. Left hip demonstrate no scars and the skin has no signs of inflammation or tenderness. Right hip demonstrates well healed scar from right THR with no erythema, swelling, or tenderness. \par Both Hips have a range of motion that is symmetrical in flexion and extension of:\par Hip flexion:            Right 100 degrees               Left 90 degrees\par Hip abduction:      Right 40 degrees                  Left 30 degrees\par Hip adduction:      Right 20 degrees                  Left  10 degrees\par Internal rotation:      Right 15 degrees                 Left 10 degrees\par External rotation:     Right 40 degrees                 Left 20 degrees\par There is no flexion contracture, deformity or instability. \par Labral impingement tests are negative.\par Right hip flexor, abductor and extensor power is grade 5.\par Left hip flexor, abductor and extensor power is grade 4+.\par \par Spine Exam:\par There is mild curvature of the spine with loss of normal lumbar lordosis. The skin is devoid of erythema, scars, pigmentation or rashes. There is mild lumbar spasm and tenderness especially at L4-L5 or L5-S1. \par The range of motion of the lumbar spine is limited by pain and spasm. Forward flexion is 80% normal, extension catch positive, lateral flexion and rotation 80% normal. There is no lumbar spine imbalance or instability detected.\par Bilateral passive SLR is right 40 degrees, left 40 degrees in supine and sitting positions. Lasegue's Test is negative.\par Neurology:\par The patient is alert and oriented in person, place and time. The mood is calm and affect is normal.\par Testing for coordination including Rhomberg's Test and Finger-Nose Test, sensation, motor tone and power and deep tendon reflexes in both lower extremities is normal. [de-identified] : The following radiographs were ordered and read by me during this patients visit. I reviewed each radiograph in detail with the patient and discussed the findings as highlighted below. \par AP and False profile radiographs of the left hip and pelvis confirm advanced degenerative joint disease with joint space narrowing, osteophyte and cyst formation, Femoral head demonstrates AVN with subchondral fracture. \par AP and Lateral radiographs of the lumbar spine reveal extensive DJD with loss of normal lordosis and degenerative scoliosis. \par  \par MRI brought in by patient reveals Left Hip AVN with insufficiency fracture and slight collapse of femoral head.

## 2020-01-29 NOTE — REVIEW OF SYSTEMS
[Joint Stiffness] : joint stiffness [Joint Swelling] : joint swelling [Joint Pain] : joint pain [Arthralgia] : arthralgia [Negative] : Endocrine

## 2020-01-29 NOTE — HISTORY OF PRESENT ILLNESS
[8] : an average pain level of 8/10 [de-identified] : Ms. LUCY MCDANIEL is a 74 year old female, status post left total hip replacement 5 years ago with Dr De La Cruz, presents with left hip pain, present for 2 years, now worsening. Patent was recently diagnosed with a subchondral insufficiency fracture of left femur. She localizes the pain to the groin and lateral aspect of the Left Hip. The patient describes the pain as sharp, and states it is intermittent, based on activity. She states the pain is present when walking, climbing stairs, standing from a seated position, and deep flexion of the hip. She admits to worsening stiffness of the hip, which is now interfering with ADLs such as dressing and toe nail care. She cannot take NSAIDs due to GI upset, she is currently taking tylenol and is managing the pain. She admits to prior conservative management inclusive of physical therapy in the past with no significant relief. She admits to lower back pain, but denies numbness and tingling of the lower extremities. The patient admits to limitations in her  quality of life, and is present to discuss options for treatment. LONNY.

## 2020-01-29 NOTE — DISCUSSION/SUMMARY
[de-identified] : Left Hip advanced degenerative joint disease, Left femoral head AVN with subchondral insufficiency fracture. Lumbar DJD, stable right THR \par The natural history and treatment of degenerative arthritis, with AVN and subchondral insufficiency fractures, was discussed with the patient at length today. The spectrum of treatment including nonoperative modalities to surgical intervention was elucidated. Noninvasive and nonoperative treatment modalities include weight reduction, activity modification with low impact exercise,  as needed use of acetaminophen or anti-inflammatory medications if tolerated, glucosamine/chondroitin supplements, and physical therapy. Definitive surgical treatment can certainly include total joint arthroplasty also. The risks and benefits of each treatment options was discussed and all questions were answered.\par Although surgery is a treatment option I have explained that it is not a medical emergency at this time due to her pain level being stable with tylenol. I have explained the chance of further collapse which would illicit more pain and would then but more appropriate to operate. Patients pain level is a major player in decision of conservative vs operative management. \par Patient has stressed that she would like to hold off on surgery until after [place]. \par In the interim she will be treated conservatively. \par The patient was informed of the findings and recommended conservative management in the form of a home exercise program, activity modifications, stationary bicycling, swimming and weight loss program. A trial of Glucosamine and Chondroiten Sulphate was recommended.\par A prescription for a course of physical therapy was provided.\par Patient cannot tolerate NSAIDs. \par Follow-up appointment was recommended in 3-6 months.\par

## 2020-01-29 NOTE — CONSULT LETTER
[Dear  ___] : Dear  [unfilled], [Consult Letter:] : I had the pleasure of evaluating your patient, [unfilled]. [Please see my note below.] : Please see my note below. [Consult Closing:] : Thank you very much for allowing me to participate in the care of this patient.  If you have any questions, please do not hesitate to contact me. [Sincerely,] : Sincerely, [FreeTextEntry2] : CHRISTIANO WHITTEN  [FreeTextEntry3] : Barrera Pitts MD\par \par ______________________________________________\par [facility]: Hip/Knee Arthroplasty\par [address], [facility] [address], [place] [address]\par (t) [phone]\par (f) [phone]

## 2020-02-06 ENCOUNTER — APPOINTMENT (OUTPATIENT)
Dept: ORTHOPEDIC SURGERY | Facility: CLINIC | Age: 75
End: 2020-02-06
Payer: MEDICARE

## 2020-02-06 VITALS
WEIGHT: 155 LBS | SYSTOLIC BLOOD PRESSURE: 134 MMHG | HEART RATE: 105 BPM | HEIGHT: 61 IN | DIASTOLIC BLOOD PRESSURE: 78 MMHG | BODY MASS INDEX: 29.27 KG/M2

## 2020-02-06 DIAGNOSIS — M87.052 IDIOPATHIC ASEPTIC NECROSIS OF LEFT FEMUR: ICD-10-CM

## 2020-02-06 PROCEDURE — 99214 OFFICE O/P EST MOD 30 MIN: CPT

## 2020-02-06 NOTE — HISTORY OF PRESENT ILLNESS
[8] : an average pain level of 8/10 [Worsening] : worsening [de-identified] : Ms. LUCY MCDANIEL is a 74 year old female, status post left total hip replacement 5 years ago with Dr De La Cruz, presents with left hip pain, present for 2 years, now worsening. She was last seen last week, and a discussion was had with the patient regarding hip replacement. \par  Patent was recently diagnosed with a subchondral insufficiency fracture of left femur. She localizes the pain to the groin and lateral aspect of the Left Hip. The patient describes the pain as sharp, and states it is intermittent, based on activity. She states the pain is present when walking, climbing stairs, standing from a seated position, and deep flexion of the hip. She admits to worsening stiffness of the hip, which is now interfering with ADLs such as dressing and toe nail care. She cannot take NSAIDs due to GI upset, she is currently taking tylenol and is managing the pain. She admits to prior conservative management inclusive of physical therapy in the past with no significant relief. She admits to lower back pain, but denies numbness and tingling of the lower extremities. The patient admits to limitations in her  quality of life, and is present to discuss options for treatment. She notes since last visit, she has decided not to hold from surgical intervention and would like to now proceed with left total hip replacement. \par She notes she has had a history of blood transfusions for anemia, with unknown source. She follows with a hematologist. \par  [Constant] : ~He/She~ states the symptoms seem to be constant [Rest] : relieved by rest [Walking] : worsened by walking

## 2020-02-06 NOTE — REASON FOR VISIT
[Follow-Up Visit] : a follow-up visit for [Hip Pain] : hip pain [FreeTextEntry2] : left hip pain long standing

## 2020-02-06 NOTE — CONSULT LETTER
[Dear  ___] : Dear  [unfilled], [Consult Letter:] : I had the pleasure of evaluating your patient, [unfilled]. [Please see my note below.] : Please see my note below. [Consult Closing:] : Thank you very much for allowing me to participate in the care of this patient.  If you have any questions, please do not hesitate to contact me. [Sincerely,] : Sincerely, [FreeTextEntry3] : Barrera Pitts MD\par \par ______________________________________________\par [facility]: Hip/Knee Arthroplasty\par [address] [facility] [facility] [address], [place] [address]\par (t) [phone]\par (f) [phone]  [FreeTextEntry2] : CHRISTIANO WHITTEN

## 2020-02-06 NOTE — PHYSICAL EXAM
[Coxalgic] : coxalgic [LE] : Sensory: Intact in bilateral lower extremities [Ankle] : ankle 2+ and symmetric bilaterally [Knee] : patellar 2+ and symmetric bilaterally [DP] : dorsalis pedis 2+ and symmetric bilaterally [PT] : posterior tibial 2+ and symmetric bilaterally [de-identified] : On general examination the patient is adequately groomed and nourished. The vital parameters are as recorded. \par There is no lymphedema or diffuse swelling, no varicose veins, no skin warmth/erythema/scars/swelling, no ulcers and no palpable lymph nodes or masses in both lower extremities. Bilateral pedal pulses are well palpable.\par Upper Extremity:\par Both right and left upper extremities are unremarkable in terms of skin rash, lesions, pigmentation, redness, tenderness, swelling, joint instability, abnormal deformity or crepitus. The overall range of motion, sensation, motor tone and strength testing are normal.\par \par Hip Exam:\par The gait is left stiff hip coxalgic.\par The patient has unequal leg lengths - left lower limb shortening of 0.5 cm and no pelvic tilt. \par Kristopher/David test is 6 inches on the right and 10 inches on the left. \par Active SLR is 60 degrees on the right and 40 degrees on the left. Left hip demonstrate no scars and the skin has no signs of inflammation or tenderness. Right hip demonstrates well healed scar from right THR with no erythema, swelling, or tenderness. \par Both Hips have a range of motion that is symmetrical in flexion and extension of:\par Hip flexion:            Right 100 degrees               Left 90 degrees\par Hip abduction:      Right 40 degrees                  Left 30 degrees\par Hip adduction:      Right 20 degrees                  Left  10 degrees\par Internal rotation:      Right 15 degrees                 Left 10 degrees\par External rotation:     Right 40 degrees                 Left 20 degrees\par There is no flexion contracture, deformity or instability. \par Labral impingement tests are negative.\par Right hip flexor, abductor and extensor power is grade 5.\par Left hip flexor, abductor and extensor power is grade 4+.\par \par Spine Exam:\par There is mild curvature of the spine with loss of normal lumbar lordosis. The skin is devoid of erythema, scars, pigmentation or rashes. There is mild lumbar spasm and tenderness especially at L4-L5 or L5-S1. \par The range of motion of the lumbar spine is limited by pain and spasm. Forward flexion is 80% normal, extension catch positive, lateral flexion and rotation 80% normal. There is no lumbar spine imbalance or instability detected.\par Bilateral passive SLR is right 40 degrees, left 40 degrees in supine and sitting positions. Lasegue's Test is negative.\par Neurology:\par The patient is alert and oriented in person, place and time. The mood is calm and affect is normal.\par Testing for coordination including Rhomberg's Test and Finger-Nose Test, sensation, motor tone and power and deep tendon reflexes in both lower extremities is normal. [de-identified] : The following radiographs taken last visit, were again reviewed and read by me during this patients visit. I reviewed each radiograph in detail with the patient and discussed the findings as highlighted below. \par AP and False profile radiographs of the left hip and pelvis confirm advanced degenerative joint disease with joint space narrowing, osteophyte and cyst formation. There is flattening of the left femoral head. \par AP and Lateral radiographs of the lumbar spine reveal extensive DJD with loss of normal lordosis and degenerative scoliosis. \par MRI brought in by patient reveals Left Hip AVN with insufficiency fracture and slight collapse of femoral head.

## 2020-02-06 NOTE — DISCUSSION/SUMMARY
[de-identified] : Left Hip advanced degenerative joint disease, Left femoral head AVN with subchondral insufficiency fracture. Lumbar DJD, stable right THR \par \par The natural history and treatment of degenerative arthritis, with AVN/ subchondral insufficiency fractures and DJD, was discussed with the patient at length today. The spectrum of treatment including nonoperative modalities to surgical intervention was elucidated. Noninvasive and nonoperative treatment modalities include weight reduction, activity modification with low impact exercise,  as needed use of acetaminophen or anti-inflammatory medications if tolerated, glucosamine/chondroitin supplements, and physical therapy. Definitive surgical treatment can certainly include total joint arthroplasty also. The risks and benefits of each treatment options was discussed and all questions were answered.\par \par In view of lack of adequate pain relief with conservative (non-surgical) management protocol including physical therapy, home exercises, weight loss, activity modification, NSAIDS; the patient is recommended to consider a LEFT Total Hip Replacement. \par \par The risks, benefits, alternatives, implications, complications including but not limited to pain, stiffness, bleeding, limp, wound breakdown, infection, limb length discrepancy, dislocation, bone fracture, nerve and vascular compromise, implant wear and durability issues and rehabilitation were discussed and relevant questions were addressed. The possibility of recurrent pain, no improvement in pain and actual worsening of the pain were also mentioned in conversation with the patient. Medical complications related to the patient's general medical health including deep vein thrombosis, pulmonary embolus, heart attack, stroke, death and other complications from anesthesia were discussed as well. Anticoagulation prophylaxis medication options to address risks of deep vein thrombosis and pulmonary embolism were discussed and weighed against the risks of bleeding and wound healing complications. The patient elected Ecotrin/Xarelto prophylaxis with mechanical modalities.\par \par I have reviewed the plan of care as well as a model of a total hip replacement implant equivalent to the one that will be used for their total hip replacement.  The patient agrees with the plan of care, as well as the use of implants for their total hip replacement.  The patient wishes to proceed and will undergo preoperative medical evaluation and clearance, attend the preoperative educational class and will schedule surgery appropriately.\par \par She has been advised she will need medical along with hematology clearance due to her history of anemia and need for blood transfusions\par \par The patient expresses understanding of the above, and all questions have been answered.

## 2020-02-25 ENCOUNTER — OUTPATIENT (OUTPATIENT)
Dept: OUTPATIENT SERVICES | Facility: HOSPITAL | Age: 75
LOS: 1 days | End: 2020-02-25
Payer: MEDICARE

## 2020-02-25 VITALS
HEART RATE: 77 BPM | TEMPERATURE: 98 F | OXYGEN SATURATION: 96 % | DIASTOLIC BLOOD PRESSURE: 70 MMHG | RESPIRATION RATE: 16 BRPM | WEIGHT: 160.06 LBS | HEIGHT: 61 IN | SYSTOLIC BLOOD PRESSURE: 134 MMHG

## 2020-02-25 DIAGNOSIS — M16.12 UNILATERAL PRIMARY OSTEOARTHRITIS, LEFT HIP: ICD-10-CM

## 2020-02-25 DIAGNOSIS — Z98.890 OTHER SPECIFIED POSTPROCEDURAL STATES: Chronic | ICD-10-CM

## 2020-02-25 DIAGNOSIS — Z90.49 ACQUIRED ABSENCE OF OTHER SPECIFIED PARTS OF DIGESTIVE TRACT: Chronic | ICD-10-CM

## 2020-02-25 DIAGNOSIS — G47.33 OBSTRUCTIVE SLEEP APNEA (ADULT) (PEDIATRIC): ICD-10-CM

## 2020-02-25 DIAGNOSIS — Z96.641 PRESENCE OF RIGHT ARTIFICIAL HIP JOINT: Chronic | ICD-10-CM

## 2020-02-25 DIAGNOSIS — Z91.041 RADIOGRAPHIC DYE ALLERGY STATUS: ICD-10-CM

## 2020-02-25 LAB
ANION GAP SERPL CALC-SCNC: 12 MMO/L — SIGNIFICANT CHANGE UP (ref 7–14)
APPEARANCE UR: CLEAR — SIGNIFICANT CHANGE UP
BILIRUB UR-MCNC: NEGATIVE — SIGNIFICANT CHANGE UP
BLD GP AB SCN SERPL QL: NEGATIVE — SIGNIFICANT CHANGE UP
BLOOD UR QL VISUAL: NEGATIVE — SIGNIFICANT CHANGE UP
BUN SERPL-MCNC: 22 MG/DL — SIGNIFICANT CHANGE UP (ref 7–23)
CALCIUM SERPL-MCNC: 9.2 MG/DL — SIGNIFICANT CHANGE UP (ref 8.4–10.5)
CHLORIDE SERPL-SCNC: 100 MMOL/L — SIGNIFICANT CHANGE UP (ref 98–107)
CO2 SERPL-SCNC: 27 MMOL/L — SIGNIFICANT CHANGE UP (ref 22–31)
COLOR SPEC: COLORLESS — SIGNIFICANT CHANGE UP
CREAT SERPL-MCNC: 0.81 MG/DL — SIGNIFICANT CHANGE UP (ref 0.5–1.3)
GLUCOSE SERPL-MCNC: 72 MG/DL — SIGNIFICANT CHANGE UP (ref 70–99)
GLUCOSE UR-MCNC: NEGATIVE — SIGNIFICANT CHANGE UP
HBA1C BLD-MCNC: 5.4 % — SIGNIFICANT CHANGE UP (ref 4–5.6)
HCT VFR BLD CALC: 36.9 % — SIGNIFICANT CHANGE UP (ref 34.5–45)
HGB BLD-MCNC: 11.4 G/DL — LOW (ref 11.5–15.5)
KETONES UR-MCNC: NEGATIVE — SIGNIFICANT CHANGE UP
LEUKOCYTE ESTERASE UR-ACNC: NEGATIVE — SIGNIFICANT CHANGE UP
MCHC RBC-ENTMCNC: 30.9 % — LOW (ref 32–36)
MCHC RBC-ENTMCNC: 31.6 PG — SIGNIFICANT CHANGE UP (ref 27–34)
MCV RBC AUTO: 102.2 FL — HIGH (ref 80–100)
NITRITE UR-MCNC: NEGATIVE — SIGNIFICANT CHANGE UP
NRBC # FLD: 0 K/UL — SIGNIFICANT CHANGE UP (ref 0–0)
PH UR: 6.5 — SIGNIFICANT CHANGE UP (ref 5–8)
PLATELET # BLD AUTO: 284 K/UL — SIGNIFICANT CHANGE UP (ref 150–400)
PMV BLD: 10.5 FL — SIGNIFICANT CHANGE UP (ref 7–13)
POTASSIUM SERPL-MCNC: 3.8 MMOL/L — SIGNIFICANT CHANGE UP (ref 3.5–5.3)
POTASSIUM SERPL-SCNC: 3.8 MMOL/L — SIGNIFICANT CHANGE UP (ref 3.5–5.3)
PROT UR-MCNC: NEGATIVE — SIGNIFICANT CHANGE UP
RBC # BLD: 3.61 M/UL — LOW (ref 3.8–5.2)
RBC # FLD: 14 % — SIGNIFICANT CHANGE UP (ref 10.3–14.5)
RH IG SCN BLD-IMP: POSITIVE — SIGNIFICANT CHANGE UP
SODIUM SERPL-SCNC: 139 MMOL/L — SIGNIFICANT CHANGE UP (ref 135–145)
SP GR SPEC: 1.01 — SIGNIFICANT CHANGE UP (ref 1–1.04)
UROBILINOGEN FLD QL: NORMAL — SIGNIFICANT CHANGE UP
WBC # BLD: 8.88 K/UL — SIGNIFICANT CHANGE UP (ref 3.8–10.5)
WBC # FLD AUTO: 8.88 K/UL — SIGNIFICANT CHANGE UP (ref 3.8–10.5)

## 2020-02-25 PROCEDURE — 93010 ELECTROCARDIOGRAM REPORT: CPT

## 2020-02-25 RX ORDER — LEVOTHYROXINE SODIUM 125 MCG
1 TABLET ORAL
Qty: 0 | Refills: 0 | DISCHARGE

## 2020-02-25 NOTE — H&P PST ADULT - NEGATIVE GENERAL GENITOURINARY SYMPTOMS
no dysuria/no flank pain L/no urine discoloration/no renal colic/no gas in urine/no flank pain R/no bladder infections/no hematuria/normal urinary frequency

## 2020-02-25 NOTE — H&P PST ADULT - NEGATIVE OPHTHALMOLOGIC SYMPTOMS
no lacrimation L/no lacrimation R/no discharge L/no diplopia/no photophobia/no blurred vision L/no pain R/no irritation R/no loss of vision L/no blurred vision R/no pain L/no loss of vision R/no discharge R/no irritation L no lacrimation L/no lacrimation R/no discharge L/no diplopia/no photophobia/no blurred vision L/no pain R/no irritation L/no irritation R/no loss of vision L/no loss of vision R/no scleral injection L/no discharge R/no pain L/no scleral injection R/no blurred vision R

## 2020-02-25 NOTE — H&P PST ADULT - MUSCULOSKELETAL
details… detailed exam no joint erythema/no joint warmth/no calf tenderness/diminished strength/decreased ROM due to pain

## 2020-02-25 NOTE — H&P PST ADULT - NEGATIVE GENERAL SYMPTOMS
no sweating/no fever/no chills/no weight gain no fever/no weight gain/no polyuria/no chills/no polyphagia/no sweating/no weight loss/no malaise

## 2020-02-25 NOTE — H&P PST ADULT - NEUROLOGICAL COMMENTS
chronic neck pain with radiculopathy to BUE, left worse than right, numbness/tingling.  c/o chronic low back pain, hips pain

## 2020-02-25 NOTE — H&P PST ADULT - RS GEN PE MLT RESP DETAILS PC
no chest wall tenderness/no intercostal retractions/no rales/clear to auscultation bilaterally/no rhonchi/respirations non-labored/airway patent/no wheezes/breath sounds equal/good air movement

## 2020-02-25 NOTE — H&P PST ADULT - SYMPTOMS
chronic LU when walking up more than 1 flight of stairs, especially during allergies season, pt to see PCP for preop evaluation on Oct 9th/none/dyspnea

## 2020-02-25 NOTE — H&P PST ADULT - GASTROINTESTINAL DETAILS
no distention/no guarding/no rebound tenderness/nontender/soft/no masses palpable/bowel sounds normal

## 2020-02-25 NOTE — H&P PST ADULT - NSICDXPASTMEDICALHX_GEN_ALL_CORE_FT
PAST MEDICAL HISTORY:  Anemia     Cataract     Cervical spinal stenosis     Diverticulosis     GERD (gastroesophageal reflux disease)     H/O bronchitis     Hiatal hernia     History of bunionectomy of both great toes     Hypothyroid     Low back pain chronic    Lumbar spinal stenosis     BHAKTI (obstructive sleep apnea) wears mouth guard    Seasonal allergies

## 2020-02-25 NOTE — H&P PST ADULT - HISTORY OF PRESENT ILLNESS
75 y/o   female with PMH: BHAKTI (wears mouth guard), Hypothyroid, s/p right THR 2015 presents to PST today for pre op evaluation with c/o b/l hip pain, R>L x 6 months, symptoms worsened significantly in the past 2 months. insidious onset, progressively worsening over time. 75 y/o   female with PMH: BHAKTI (wears mouth guard), Hypothyroid, GERD, s/p right THR 2015 presents to PST today for pre op evaluation with c/o b/l hip pain, R>L x 6 months, symptoms worsened gradually. Now scheduled for left total hip replacement Direct anterior approach on 03/16/2020

## 2020-02-25 NOTE — H&P PST ADULT - NEGATIVE CARDIOVASCULAR SYMPTOMS
no paroxysmal nocturnal dyspnea/no chest pain/no claudication/no palpitations/no dyspnea on exertion/no orthopnea/no peripheral edema

## 2020-02-25 NOTE — H&P PST ADULT - NEGATIVE ENMT SYMPTOMS
no nose bleeds/no ear pain/no nasal congestion/no nasal obstruction/no post-nasal discharge/no abnormal taste sensation/no dysphagia/no throat pain/no vertigo/no sinus symptoms/no nasal discharge

## 2020-02-25 NOTE — H&P PST ADULT - NSICDXPASTSURGICALHX_GEN_ALL_CORE_FT
PAST SURGICAL HISTORY:  History of bunionectomy of both great toes     History of total hip replacement, right     S/P LASIK surgery left eye    Status post appendectomy

## 2020-02-25 NOTE — H&P PST ADULT - NEGATIVE GASTROINTESTINAL SYMPTOMS
no melena/no abdominal pain/no nausea/no change in bowel habits/no jaundice/no hiccoughs/no diarrhea/no vomiting

## 2020-02-26 LAB
BACTERIA UR CULT: SIGNIFICANT CHANGE UP
SPECIMEN SOURCE: SIGNIFICANT CHANGE UP
SPECIMEN SOURCE: SIGNIFICANT CHANGE UP

## 2020-02-28 LAB — BACTERIA NPH CULT: SIGNIFICANT CHANGE UP

## 2020-03-11 DIAGNOSIS — M16.12 UNILATERAL PRIMARY OSTEOARTHRITIS, LEFT HIP: ICD-10-CM

## 2020-03-13 ENCOUNTER — TRANSCRIPTION ENCOUNTER (OUTPATIENT)
Age: 75
End: 2020-03-13

## 2020-03-16 ENCOUNTER — APPOINTMENT (OUTPATIENT)
Dept: ORTHOPEDIC SURGERY | Facility: HOSPITAL | Age: 75
End: 2020-03-16

## 2020-03-20 PROBLEM — M48.061 SPINAL STENOSIS, LUMBAR REGION WITHOUT NEUROGENIC CLAUDICATION: Chronic | Status: ACTIVE | Noted: 2020-02-25

## 2020-03-20 PROBLEM — K57.90 DIVERTICULOSIS OF INTESTINE, PART UNSPECIFIED, WITHOUT PERFORATION OR ABSCESS WITHOUT BLEEDING: Chronic | Status: ACTIVE | Noted: 2020-02-25

## 2020-03-20 NOTE — DISCHARGE NOTE PROVIDER - DISCHARGE DATE
Provider, MD   aspirin 81 MG tablet Take 81 mg by mouth daily   Yes Historical Provider, MD   meclizine (ANTIVERT) 25 MG tablet Take 25 mg by mouth 3 times daily as needed    Historical Provider, MD     Medications:   Current Facility-Administered Medications   Medication Dose Route Frequency Provider Last Rate Last Dose    aspirin tablet 325 mg  325 mg Oral Daily Sandie Rutherford MD   325 mg at 03/20/20 9682    PHENobarbital (LUMINAL) injection 260 mg  260 mg Intramuscular Once Carlyn Bamberger, MD        LORazepam (ATIVAN) injection 1 mg  1 mg Intravenous Q4H PRN Carlyn Bamberger, MD        potassium chloride 20 mEq/50 mL IVPB (Central Line)  20 mEq Intravenous PRN Tori Smith MD 50 mL/hr at 03/18/20 0648 20 mEq at 03/18/20 0648    sodium chloride 0.9 % 226 mL with folic acid 1 mg, adult multi-vitamin with vitamin k 10 mL, thiamine 300 mg   Intravenous Daily Lisa Montague  mL/hr at 03/19/20 0839      insulin lispro (1 Unit Dial) 0-18 Units  0-18 Units Subcutaneous Q4H Carlyn Bamberger, MD   3 Units at 03/20/20 0517    sodium chloride flush 0.9 % injection 10 mL  10 mL Intravenous 2 times per day Tori Smith MD   10 mL at 03/19/20 1954    sodium chloride flush 0.9 % injection 10 mL  10 mL Intravenous PRN Tori Smith MD        acetaminophen (TYLENOL) tablet 650 mg  650 mg Oral Q6H PRN Tori Smith MD        Or   [facility] acetaminophen (TYLENOL) suppository 650 mg  650 mg Rectal Q6H PRN Tori Smith MD        polyethylene glycol (GLYCOLAX) packet 17 g  17 g Oral Daily PRN Tori Smith MD        promethazine (PHENERGAN) tablet 12.5 mg  12.5 mg Oral Q6H PRN Tori Smith MD        Or    ondansetron (ZOFRAN) injection 4 mg  4 mg Intravenous Q6H PRN Tori Smith MD        enoxaparin (LOVENOX) injection 40 mg  40 mg Subcutaneous Daily Tori Smith MD   40 mg at 03/19/20 0901    magnesium sulfate 2 g in 50 mL IVPB premix  2 g Intravenous PRN Tori Smith MD        dextrose 50 % IV solution  12.5 g Intravenous PRN Saul Guadarrama MD        dextrose 5 % solution  100 mL/hr Intravenous PRN Saul Guadarrama MD           Allergies:  Lipitor [atorvastatin]    Social History:    TOBACCO:   reports that he quit smoking about 25 years ago. He has never used smokeless tobacco.  ETOH:   reports current alcohol use of about 4.0 - 12.0 standard drinks of alcohol per week. DRUGS:   reports no history of drug use.   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    Family History:        Problem Relation Age of Onset    Cancer Mother        REVIEW OF SYSTEMS:      CONSTITUTIONAL: Alert/Oriented to his situation, pleasant 62 y/o  DERMATOLOGICAL: negative  NEUROLOGICAL:  positive for prolong EEF is abnormal;   EYES:  negative  HEENT:  negative  RESPIRATORY:  negative  CARDIOVASCULAR:  negative  GASTROINTESTINAL:  negative  GENITO-URINARY: negative  ENDOCRINE: positive for - IDDM  MUSCULOSKELETAL:  negative  HEMATOLOGICAL AND LYMPHATIC: negative  IMMUNOLOGICAL: negative  PSYCHOLOGICAL: negative    PHYSICAL EXAM:    VITALS:  BP (!) 143/84   Pulse 90   Temp 97.1 °F (36.2 °C) (Temporal)   Resp 20   Ht 5' 7.5\" (1.715 m)   Wt 165 lb 9.1 oz (75.1 kg)   SpO2 93%   BMI 25.55 kg/m²     Eyes:  lids and lashes normal, pupils equal and round, extra ocular muscles intact, sclera clear, conjunctiva normal    Head/ENT:  Normocephalic, atraumatic, all natural teeth, routine dental exams residual dentition, normal gums, & palate, oropharynx without erythema or exudates    Neck:  supple, symmetrical, trachea midline, no lymphadenopathy, no jugular venous distension, no carotid bruits and MASSES:  no masses    Lungs:  no increased work of breathing, good air exchange, no retractions and clear to auscultation, no palpable / percussible abnormalities    Cardiovascular:  regular rate and rhythm, S1, S2 normal, no murmur, click, rub or gallop    Pulses:  Right dorsalis pedis 2, Left dorsalis pedis 2, Right posterior tibial 2, Left Posterior tibial 2, Right Femoral 2, Left Femoral 2, Right radial 2, and Left radial 2    Abdomen:  Soft, normal bowel sounds, non-tender, no hepatosplenomegaly, aorta likely normal and bruits absent    Musculoskeletal:  Back is straight and non-tender,  No CVAT, full ROM of upper and lower extremities.     Extremities:   No clubbing, or cyanosis, or edema     Skin: warm and normal turgor, no ulcers, infections, or rashes, no rashes, no wounds    Neurological: awake, alert and oriented x 3, motor 5/5 bilateral upper and lower extremities, sensation grossly intact    Psychiatric: Mood and affect appear appropriate    DATA:    EKG:    Normal sinus rhythmCannot rule out Inferior infarct , age undeterminedST and T abnormality, consider lateral ischemia    CBC:   Lab Results   Component Value Date    WBC 11.5 03/20/2020    RBC 5.10 03/20/2020    HGB 15.3 03/20/2020    HCT 45.5 03/20/2020    MCV 89.3 03/20/2020    MCH 30.1 03/20/2020    MCHC 33.7 03/20/2020    RDW 12.7 03/20/2020     03/20/2020    MPV 8.8 03/20/2020     BMP:    Lab Results   Component Value Date     03/20/2020    K 3.7 03/20/2020     03/20/2020    CO2 22 03/20/2020    BUN 25 03/20/2020    LABALBU 3.9 03/17/2020    CREATININE 0.6 03/20/2020    CALCIUM 9.4 03/20/2020    GFRAA >60 03/20/2020    LABGLOM >60 03/20/2020    GLUCOSE 220 03/20/2020     Hepatic Function Panel:    Lab Results   Component Value Date    ALKPHOS 76 03/17/2020    ALT 53 03/17/2020    AST 47 03/17/2020    PROT 7.0 03/17/2020    BILITOT <0.2 03/17/2020    BILIDIR <0.2 03/17/2020    IBILI see below 03/17/2020    LABALBU 3.9 03/17/2020     PT/INR:    Lab Results   Component Value Date    PROTIME 13.2 03/13/2020    INR 1.14 03/13/2020       Last 3 Troponin:    Lab Results   Component Value Date    TROPONINI 0.26 03/13/2020     HgBA1c:    Lab Results   Component Value Date    LABA1C 8.9 03/13/2020       CXRAY: AND PLAN:    62 y/o male was transferred from [facility] intubated several days ago. Severe 3V CAD, hypoxic respiratory failure now resolved, acute encephalopathy now resolved, hyperlipidemia, hypertension     Repeat catheterization yesterday showed severe left  main disease by IVUS in the 75% range, LAD disease, circumflex disease, and the 100% mid RCA occlusion with left-to-right collaterals.     Patient quite weak and debilitated due to prolonged intubation.     Currently is being evaluated for CABG at some point next week if he is able to participate      Needs PT/OT consult, right now extremely weak and unsteady on his gait     On dysphagia III diet, will need protein supplementation.     Not sure yet about timing for surgery, would favor postponing as much as possible in order to improve his functional status more, will need a lot of pre conditioning from PT/OT standpoint, also need to wait his dysphagia to improve, high risk for aspiration with second run of intubation mechanical ventilation. No surgery for the next week at least.    Dr. Max Escamilla, thank you very much for allowing us to participate in the care of this patient. 01-Nov-2019

## 2020-04-01 ENCOUNTER — APPOINTMENT (OUTPATIENT)
Dept: ORTHOPEDIC SURGERY | Facility: CLINIC | Age: 75
End: 2020-04-01

## 2020-05-04 ENCOUNTER — APPOINTMENT (OUTPATIENT)
Dept: ORTHOPEDIC SURGERY | Facility: HOSPITAL | Age: 75
End: 2020-05-04

## 2020-05-21 ENCOUNTER — APPOINTMENT (OUTPATIENT)
Dept: ORTHOPEDIC SURGERY | Facility: CLINIC | Age: 75
End: 2020-05-21

## 2020-05-27 DIAGNOSIS — Z01.818 ENCOUNTER FOR OTHER PREPROCEDURAL EXAMINATION: ICD-10-CM

## 2020-06-04 VITALS — TEMPERATURE: 98 F | HEIGHT: 62 IN | WEIGHT: 158.07 LBS

## 2020-06-04 RX ORDER — LIDOCAINE 4 G/100G
1 CREAM TOPICAL
Qty: 0 | Refills: 0 | DISCHARGE

## 2020-06-04 RX ORDER — MULTIVIT WITH MIN/MFOLATE/K2 340-15/3 G
2 POWDER (GRAM) ORAL
Qty: 0 | Refills: 0 | DISCHARGE

## 2020-06-04 RX ORDER — ASPIRIN/CALCIUM CARB/MAGNESIUM 324 MG
1 TABLET ORAL
Qty: 0 | Refills: 0 | DISCHARGE

## 2020-06-04 NOTE — H&P PST ADULT - NSICDXPASTMEDICALHX_GEN_ALL_CORE_FT
PAST MEDICAL HISTORY:  Anemia multiple iron infusions, blood transfusions    Cataract     Cervical spinal stenosis     Diverticulosis     GERD (gastroesophageal reflux disease)     H/O bronchitis     Hiatal hernia     History of bunionectomy of both great toes     Hypothyroid     Low back pain chronic    Lumbar spinal stenosis     BHAKTI (obstructive sleep apnea) wears mouth guard    Seasonal allergies

## 2020-06-04 NOTE — H&P PST ADULT - ASSESSMENT
75 y/o female with  left hip pain  Planned surgery.-left hip replacement 6/9/20  Will obtain medical clearance  covid 19 on 6/8/20  physical on admit  Pre op instructions provided  Instructions provided on medications to continue and to take the day morning of surgery 75 y/o female with  left hip pain  Planned surgery.-left hip replacement 6/9/20  Will obtain medical clearance  covid 19 on 6/8/20-negative  physical on admit-done  Pre op instructions provided  Instructions provided on medications to continue and to take the day morning of surgery

## 2020-06-04 NOTE — H&P PST ADULT - NEGATIVE GENERAL GENITOURINARY SYMPTOMS
no flank pain L/normal urinary frequency/no dysuria/no urine discoloration/no gas in urine/no flank pain R/no hematuria/no renal colic/no bladder infections

## 2020-06-04 NOTE — H&P PST ADULT - NEGATIVE ENMT SYMPTOMS
no ear pain/no abnormal taste sensation/no nose bleeds/no nasal discharge/no throat pain/no dysphagia/no vertigo/no sinus symptoms/no post-nasal discharge/no nasal congestion/no nasal obstruction

## 2020-06-04 NOTE — H&P PST ADULT - HISTORY OF PRESENT ILLNESS
75 y/o female  with PMH of HTN, Hypothyroid, hx of anemia wityh multiple iron infusions and blod transfusion, cleared by hematologist for surgery scheduled for left hip replacement on 6/9/20. History reviewed by phone and denies any acute symptoms 73 y/o female  with PMH of HTN, Hypothyroid, hx of anemia wityh multiple iron infusions and blod transfusion, cleared by hematologist for surgery scheduled for left hip replacement on 6/9/20. History reviewed by phone and denies any acute symptoms. patient seen and examined at bedside

## 2020-06-04 NOTE — H&P PST ADULT - SYMPTOMS
dyspnea/none/chronic LU when walking up more than 1 flight of stairs, especially during allergies season, pt to see PCP for preop evaluation on Oct 9th

## 2020-06-04 NOTE — H&P PST ADULT - NEGATIVE GASTROINTESTINAL SYMPTOMS
no abdominal pain/no hiccoughs/no change in bowel habits/no diarrhea/no melena/no jaundice/no vomiting/no nausea

## 2020-06-04 NOTE — H&P PST ADULT - NEGATIVE OPHTHALMOLOGIC SYMPTOMS
no lacrimation L/no blurred vision L/no discharge L/no irritation L/no blurred vision R/no loss of vision L/no scleral injection L/no lacrimation R/no discharge R/no pain L/no irritation R/no loss of vision R/no pain R/no photophobia/no diplopia/no scleral injection R

## 2020-06-04 NOTE — H&P PST ADULT - NSICDXPROBLEM_GEN_ALL_CORE_FT
PROBLEM DIAGNOSES  Problem: Unilateral primary osteoarthritis, left hip  Assessment and Plan: Scheduled for left total hip replacement direct anterior approach on 03/16/2020. Pre op instructions, famotidine, chlorhexidine gluconate soap given and explained. Pt verbalized understanding.     Problem: BHAKTI (obstructive sleep apnea)  Assessment and Plan: OR booking notified via fax    Problem: Contrast media allergy  Assessment and Plan: OR booking notified via fax

## 2020-06-04 NOTE — H&P PST ADULT - NEGATIVE CARDIOVASCULAR SYMPTOMS
no paroxysmal nocturnal dyspnea/no dyspnea on exertion/no orthopnea/no claudication/no palpitations/no peripheral edema/no chest pain

## 2020-06-08 ENCOUNTER — OUTPATIENT (OUTPATIENT)
Dept: OUTPATIENT SERVICES | Facility: HOSPITAL | Age: 75
LOS: 1 days | End: 2020-06-08

## 2020-06-08 ENCOUNTER — TRANSCRIPTION ENCOUNTER (OUTPATIENT)
Age: 75
End: 2020-06-08

## 2020-06-08 DIAGNOSIS — Z96.641 PRESENCE OF RIGHT ARTIFICIAL HIP JOINT: Chronic | ICD-10-CM

## 2020-06-08 DIAGNOSIS — Z01.818 ENCOUNTER FOR OTHER PREPROCEDURAL EXAMINATION: ICD-10-CM

## 2020-06-08 DIAGNOSIS — Z11.59 ENCOUNTER FOR SCREENING FOR OTHER VIRAL DISEASES: ICD-10-CM

## 2020-06-08 DIAGNOSIS — Z98.890 OTHER SPECIFIED POSTPROCEDURAL STATES: Chronic | ICD-10-CM

## 2020-06-08 DIAGNOSIS — Z90.49 ACQUIRED ABSENCE OF OTHER SPECIFIED PARTS OF DIGESTIVE TRACT: Chronic | ICD-10-CM

## 2020-06-08 DIAGNOSIS — M16.12 UNILATERAL PRIMARY OSTEOARTHRITIS, LEFT HIP: ICD-10-CM

## 2020-06-08 LAB — SARS-COV-2 RNA SPEC QL NAA+PROBE: SIGNIFICANT CHANGE UP

## 2020-06-08 RX ORDER — SODIUM CHLORIDE 9 MG/ML
3 INJECTION INTRAMUSCULAR; INTRAVENOUS; SUBCUTANEOUS EVERY 8 HOURS
Refills: 0 | Status: DISCONTINUED | OUTPATIENT
Start: 2020-06-09 | End: 2020-06-10

## 2020-06-08 RX ORDER — KETOROLAC TROMETHAMINE 30 MG/ML
15 SYRINGE (ML) INJECTION ONCE
Refills: 0 | Status: DISCONTINUED | OUTPATIENT
Start: 2020-06-09 | End: 2020-06-10

## 2020-06-09 ENCOUNTER — APPOINTMENT (OUTPATIENT)
Dept: ORTHOPEDIC SURGERY | Facility: HOSPITAL | Age: 75
End: 2020-06-09

## 2020-06-09 ENCOUNTER — RESULT REVIEW (OUTPATIENT)
Age: 75
End: 2020-06-09

## 2020-06-09 ENCOUNTER — TRANSCRIPTION ENCOUNTER (OUTPATIENT)
Age: 75
End: 2020-06-09

## 2020-06-09 ENCOUNTER — INPATIENT (INPATIENT)
Facility: HOSPITAL | Age: 75
LOS: 0 days | Discharge: HOME | DRG: 470 | End: 2020-06-10
Attending: ORTHOPAEDIC SURGERY | Admitting: ORTHOPAEDIC SURGERY
Payer: MEDICARE

## 2020-06-09 VITALS
DIASTOLIC BLOOD PRESSURE: 72 MMHG | RESPIRATION RATE: 16 BRPM | OXYGEN SATURATION: 99 % | SYSTOLIC BLOOD PRESSURE: 121 MMHG | HEIGHT: 62 IN | WEIGHT: 158.29 LBS | HEART RATE: 86 BPM | TEMPERATURE: 98 F

## 2020-06-09 DIAGNOSIS — Z96.641 PRESENCE OF RIGHT ARTIFICIAL HIP JOINT: Chronic | ICD-10-CM

## 2020-06-09 DIAGNOSIS — M16.12 UNILATERAL PRIMARY OSTEOARTHRITIS, LEFT HIP: ICD-10-CM

## 2020-06-09 DIAGNOSIS — Z98.890 OTHER SPECIFIED POSTPROCEDURAL STATES: Chronic | ICD-10-CM

## 2020-06-09 DIAGNOSIS — Z90.49 ACQUIRED ABSENCE OF OTHER SPECIFIED PARTS OF DIGESTIVE TRACT: Chronic | ICD-10-CM

## 2020-06-09 LAB
ABO RH CONFIRMATION: SIGNIFICANT CHANGE UP
ANION GAP SERPL CALC-SCNC: 10 MMOL/L — SIGNIFICANT CHANGE UP (ref 5–17)
BLD GP AB SCN SERPL QL: SIGNIFICANT CHANGE UP
BUN SERPL-MCNC: 14 MG/DL — SIGNIFICANT CHANGE UP (ref 7–23)
CALCIUM SERPL-MCNC: 8.2 MG/DL — LOW (ref 8.4–10.5)
CHLORIDE SERPL-SCNC: 107 MMOL/L — SIGNIFICANT CHANGE UP (ref 96–108)
CO2 SERPL-SCNC: 23 MMOL/L — SIGNIFICANT CHANGE UP (ref 22–31)
CREAT SERPL-MCNC: 0.78 MG/DL — SIGNIFICANT CHANGE UP (ref 0.5–1.3)
GLUCOSE SERPL-MCNC: 169 MG/DL — HIGH (ref 70–99)
HCT VFR BLD CALC: 34.7 % — SIGNIFICANT CHANGE UP (ref 34.5–45)
HGB BLD-MCNC: 11 G/DL — LOW (ref 11.5–15.5)
MCHC RBC-ENTMCNC: 31.7 GM/DL — LOW (ref 32–36)
MCHC RBC-ENTMCNC: 32.2 PG — SIGNIFICANT CHANGE UP (ref 27–34)
MCV RBC AUTO: 101.5 FL — HIGH (ref 80–100)
NRBC # BLD: 0 /100 WBCS — SIGNIFICANT CHANGE UP (ref 0–0)
PLATELET # BLD AUTO: 256 K/UL — SIGNIFICANT CHANGE UP (ref 150–400)
POTASSIUM SERPL-MCNC: 4 MMOL/L — SIGNIFICANT CHANGE UP (ref 3.5–5.3)
POTASSIUM SERPL-SCNC: 4 MMOL/L — SIGNIFICANT CHANGE UP (ref 3.5–5.3)
RBC # BLD: 3.42 M/UL — LOW (ref 3.8–5.2)
RBC # FLD: 15 % — HIGH (ref 10.3–14.5)
SODIUM SERPL-SCNC: 140 MMOL/L — SIGNIFICANT CHANGE UP (ref 135–145)
WBC # BLD: 20.25 K/UL — HIGH (ref 3.8–10.5)
WBC # BLD: 7.1 K/UL — SIGNIFICANT CHANGE UP (ref 3.8–10.5)
WBC # FLD AUTO: 20.25 K/UL — HIGH (ref 3.8–10.5)
WBC # FLD AUTO: 7.1 K/UL — SIGNIFICANT CHANGE UP (ref 3.8–10.5)

## 2020-06-09 PROCEDURE — 88305 TISSUE EXAM BY PATHOLOGIST: CPT | Mod: 26

## 2020-06-09 PROCEDURE — 27130 TOTAL HIP ARTHROPLASTY: CPT | Mod: AS,LT

## 2020-06-09 PROCEDURE — 27130 TOTAL HIP ARTHROPLASTY: CPT | Mod: LT

## 2020-06-09 PROCEDURE — 72170 X-RAY EXAM OF PELVIS: CPT | Mod: 26

## 2020-06-09 PROCEDURE — 99223 1ST HOSP IP/OBS HIGH 75: CPT

## 2020-06-09 PROCEDURE — 88311 DECALCIFY TISSUE: CPT | Mod: 26

## 2020-06-09 RX ORDER — TRANEXAMIC ACID 100 MG/ML
1000 INJECTION, SOLUTION INTRAVENOUS ONCE
Refills: 0 | Status: COMPLETED | OUTPATIENT
Start: 2020-06-09 | End: 2020-06-09

## 2020-06-09 RX ORDER — SODIUM CHLORIDE 9 MG/ML
1000 INJECTION, SOLUTION INTRAVENOUS
Refills: 0 | Status: DISCONTINUED | OUTPATIENT
Start: 2020-06-09 | End: 2020-06-10

## 2020-06-09 RX ORDER — SODIUM CHLORIDE 9 MG/ML
500 INJECTION INTRAMUSCULAR; INTRAVENOUS; SUBCUTANEOUS ONCE
Refills: 0 | Status: COMPLETED | OUTPATIENT
Start: 2020-06-09 | End: 2020-06-09

## 2020-06-09 RX ORDER — TRAMADOL HYDROCHLORIDE 50 MG/1
1 TABLET ORAL
Qty: 0 | Refills: 0 | DISCHARGE

## 2020-06-09 RX ORDER — ACETAMINOPHEN 500 MG
2 TABLET ORAL
Qty: 0 | Refills: 0 | DISCHARGE

## 2020-06-09 RX ORDER — SODIUM CHLORIDE 9 MG/ML
1000 INJECTION, SOLUTION INTRAVENOUS
Refills: 0 | Status: DISCONTINUED | OUTPATIENT
Start: 2020-06-09 | End: 2020-06-09

## 2020-06-09 RX ORDER — SENNA PLUS 8.6 MG/1
1 TABLET ORAL
Qty: 0 | Refills: 0 | DISCHARGE

## 2020-06-09 RX ORDER — ASCORBIC ACID 60 MG
1 TABLET,CHEWABLE ORAL
Qty: 0 | Refills: 0 | DISCHARGE

## 2020-06-09 RX ORDER — PANTOPRAZOLE SODIUM 20 MG/1
40 TABLET, DELAYED RELEASE ORAL
Refills: 0 | Status: DISCONTINUED | OUTPATIENT
Start: 2020-06-09 | End: 2020-06-09

## 2020-06-09 RX ORDER — TRAMADOL HYDROCHLORIDE 50 MG/1
50 TABLET ORAL EVERY 8 HOURS
Refills: 0 | Status: DISCONTINUED | OUTPATIENT
Start: 2020-06-09 | End: 2020-06-10

## 2020-06-09 RX ORDER — LEVOTHYROXINE SODIUM 125 MCG
75 TABLET ORAL DAILY
Refills: 0 | Status: DISCONTINUED | OUTPATIENT
Start: 2020-06-09 | End: 2020-06-10

## 2020-06-09 RX ORDER — ACETAMINOPHEN 500 MG
1000 TABLET ORAL EVERY 8 HOURS
Refills: 0 | Status: DISCONTINUED | OUTPATIENT
Start: 2020-06-10 | End: 2020-06-10

## 2020-06-09 RX ORDER — GABAPENTIN 400 MG/1
100 CAPSULE ORAL EVERY 8 HOURS
Refills: 0 | Status: DISCONTINUED | OUTPATIENT
Start: 2020-06-09 | End: 2020-06-10

## 2020-06-09 RX ORDER — CHLORHEXIDINE GLUCONATE 213 G/1000ML
1 SOLUTION TOPICAL DAILY
Refills: 0 | Status: COMPLETED | OUTPATIENT
Start: 2020-06-09 | End: 2020-06-10

## 2020-06-09 RX ORDER — HYDROMORPHONE HYDROCHLORIDE 2 MG/ML
0.5 INJECTION INTRAMUSCULAR; INTRAVENOUS; SUBCUTANEOUS EVERY 4 HOURS
Refills: 0 | Status: DISCONTINUED | OUTPATIENT
Start: 2020-06-09 | End: 2020-06-10

## 2020-06-09 RX ORDER — KETOROLAC TROMETHAMINE 30 MG/ML
15 SYRINGE (ML) INJECTION EVERY 6 HOURS
Refills: 0 | Status: DISCONTINUED | OUTPATIENT
Start: 2020-06-09 | End: 2020-06-10

## 2020-06-09 RX ORDER — GENTAMICIN SULFATE 40 MG/ML
80 VIAL (ML) INJECTION ONCE
Refills: 0 | Status: COMPLETED | OUTPATIENT
Start: 2020-06-09 | End: 2020-06-09

## 2020-06-09 RX ORDER — DEXAMETHASONE 0.5 MG/5ML
10 ELIXIR ORAL ONCE
Refills: 0 | Status: COMPLETED | OUTPATIENT
Start: 2020-06-10 | End: 2020-06-10

## 2020-06-09 RX ORDER — OXYCODONE HYDROCHLORIDE 5 MG/1
5 TABLET ORAL ONCE
Refills: 0 | Status: DISCONTINUED | OUTPATIENT
Start: 2020-06-09 | End: 2020-06-09

## 2020-06-09 RX ORDER — DOCUSATE SODIUM 100 MG
1 CAPSULE ORAL
Qty: 0 | Refills: 0 | DISCHARGE

## 2020-06-09 RX ORDER — HYDROMORPHONE HYDROCHLORIDE 2 MG/ML
0.5 INJECTION INTRAMUSCULAR; INTRAVENOUS; SUBCUTANEOUS
Refills: 0 | Status: DISCONTINUED | OUTPATIENT
Start: 2020-06-09 | End: 2020-06-09

## 2020-06-09 RX ORDER — CEFAZOLIN SODIUM 1 G
2000 VIAL (EA) INJECTION ONCE
Refills: 0 | Status: COMPLETED | OUTPATIENT
Start: 2020-06-09 | End: 2020-06-09

## 2020-06-09 RX ORDER — ACETAMINOPHEN 500 MG
1000 TABLET ORAL EVERY 6 HOURS
Refills: 0 | Status: COMPLETED | OUTPATIENT
Start: 2020-06-09 | End: 2020-06-10

## 2020-06-09 RX ORDER — LEVOTHYROXINE SODIUM 125 MCG
1 TABLET ORAL
Qty: 0 | Refills: 0 | DISCHARGE

## 2020-06-09 RX ORDER — OXYCODONE HYDROCHLORIDE 5 MG/1
5 TABLET ORAL
Refills: 0 | Status: DISCONTINUED | OUTPATIENT
Start: 2020-06-09 | End: 2020-06-10

## 2020-06-09 RX ORDER — PANTOPRAZOLE SODIUM 20 MG/1
40 TABLET, DELAYED RELEASE ORAL
Refills: 0 | Status: DISCONTINUED | OUTPATIENT
Start: 2020-06-10 | End: 2020-06-10

## 2020-06-09 RX ORDER — ASPIRIN/CALCIUM CARB/MAGNESIUM 324 MG
1 TABLET ORAL
Qty: 0 | Refills: 0 | DISCHARGE

## 2020-06-09 RX ORDER — OXYCODONE HYDROCHLORIDE 5 MG/1
1 TABLET ORAL
Qty: 0 | Refills: 0 | DISCHARGE

## 2020-06-09 RX ORDER — ONDANSETRON 8 MG/1
4 TABLET, FILM COATED ORAL ONCE
Refills: 0 | Status: DISCONTINUED | OUTPATIENT
Start: 2020-06-09 | End: 2020-06-09

## 2020-06-09 RX ORDER — ASPIRIN/CALCIUM CARB/MAGNESIUM 324 MG
325 TABLET ORAL EVERY 12 HOURS
Refills: 0 | Status: DISCONTINUED | OUTPATIENT
Start: 2020-06-10 | End: 2020-06-10

## 2020-06-09 RX ORDER — MAGNESIUM HYDROXIDE 400 MG/1
30 TABLET, CHEWABLE ORAL DAILY
Refills: 0 | Status: DISCONTINUED | OUTPATIENT
Start: 2020-06-09 | End: 2020-06-10

## 2020-06-09 RX ORDER — CEFAZOLIN SODIUM 1 G
2000 VIAL (EA) INJECTION EVERY 8 HOURS
Refills: 0 | Status: COMPLETED | OUTPATIENT
Start: 2020-06-09 | End: 2020-06-10

## 2020-06-09 RX ORDER — ONDANSETRON 8 MG/1
4 TABLET, FILM COATED ORAL EVERY 6 HOURS
Refills: 0 | Status: DISCONTINUED | OUTPATIENT
Start: 2020-06-09 | End: 2020-06-10

## 2020-06-09 RX ORDER — GABAPENTIN 400 MG/1
1 CAPSULE ORAL
Qty: 0 | Refills: 0 | DISCHARGE

## 2020-06-09 RX ORDER — GABAPENTIN 400 MG/1
300 CAPSULE ORAL ONCE
Refills: 0 | Status: COMPLETED | OUTPATIENT
Start: 2020-06-09 | End: 2020-06-09

## 2020-06-09 RX ORDER — OXYCODONE HYDROCHLORIDE 5 MG/1
10 TABLET ORAL
Refills: 0 | Status: DISCONTINUED | OUTPATIENT
Start: 2020-06-09 | End: 2020-06-10

## 2020-06-09 RX ORDER — DEXAMETHASONE 0.5 MG/5ML
10 ELIXIR ORAL ONCE
Refills: 0 | Status: COMPLETED | OUTPATIENT
Start: 2020-06-09 | End: 2020-06-09

## 2020-06-09 RX ORDER — SENNA PLUS 8.6 MG/1
2 TABLET ORAL AT BEDTIME
Refills: 0 | Status: DISCONTINUED | OUTPATIENT
Start: 2020-06-09 | End: 2020-06-10

## 2020-06-09 RX ORDER — POLYETHYLENE GLYCOL 3350 17 G/17G
17 POWDER, FOR SOLUTION ORAL DAILY
Refills: 0 | Status: DISCONTINUED | OUTPATIENT
Start: 2020-06-09 | End: 2020-06-10

## 2020-06-09 RX ORDER — TRAMADOL HYDROCHLORIDE 50 MG/1
50 TABLET ORAL ONCE
Refills: 0 | Status: DISCONTINUED | OUTPATIENT
Start: 2020-06-09 | End: 2020-06-09

## 2020-06-09 RX ORDER — ESOMEPRAZOLE MAGNESIUM 40 MG/1
1 CAPSULE, DELAYED RELEASE ORAL
Qty: 0 | Refills: 0 | DISCHARGE

## 2020-06-09 RX ORDER — ACETAMINOPHEN 500 MG
1000 TABLET ORAL ONCE
Refills: 0 | Status: COMPLETED | OUTPATIENT
Start: 2020-06-09 | End: 2020-06-09

## 2020-06-09 RX ORDER — APREPITANT 80 MG/1
40 CAPSULE ORAL ONCE
Refills: 0 | Status: COMPLETED | OUTPATIENT
Start: 2020-06-09 | End: 2020-06-09

## 2020-06-09 RX ADMIN — Medication 100 MILLIGRAM(S): at 18:30

## 2020-06-09 RX ADMIN — SODIUM CHLORIDE 500 MILLILITER(S): 9 INJECTION INTRAMUSCULAR; INTRAVENOUS; SUBCUTANEOUS at 13:00

## 2020-06-09 RX ADMIN — Medication 15 MILLIGRAM(S): at 18:16

## 2020-06-09 RX ADMIN — GABAPENTIN 300 MILLIGRAM(S): 400 CAPSULE ORAL at 09:10

## 2020-06-09 RX ADMIN — SODIUM CHLORIDE 100 MILLILITER(S): 9 INJECTION, SOLUTION INTRAVENOUS at 23:11

## 2020-06-09 RX ADMIN — Medication 400 MILLIGRAM(S): at 21:50

## 2020-06-09 RX ADMIN — APREPITANT 40 MILLIGRAM(S): 80 CAPSULE ORAL at 09:10

## 2020-06-09 RX ADMIN — Medication 102 MILLIGRAM(S): at 23:11

## 2020-06-09 RX ADMIN — PANTOPRAZOLE SODIUM 40 MILLIGRAM(S): 20 TABLET, DELAYED RELEASE ORAL at 21:48

## 2020-06-09 RX ADMIN — Medication 400 MILLIGRAM(S): at 16:55

## 2020-06-09 RX ADMIN — SODIUM CHLORIDE 500 MILLILITER(S): 9 INJECTION INTRAMUSCULAR; INTRAVENOUS; SUBCUTANEOUS at 16:56

## 2020-06-09 RX ADMIN — TRAMADOL HYDROCHLORIDE 50 MILLIGRAM(S): 50 TABLET ORAL at 09:09

## 2020-06-09 RX ADMIN — HYDROMORPHONE HYDROCHLORIDE 0.5 MILLIGRAM(S): 2 INJECTION INTRAMUSCULAR; INTRAVENOUS; SUBCUTANEOUS at 13:15

## 2020-06-09 RX ADMIN — PANTOPRAZOLE SODIUM 40 MILLIGRAM(S): 20 TABLET, DELAYED RELEASE ORAL at 09:36

## 2020-06-09 RX ADMIN — GABAPENTIN 100 MILLIGRAM(S): 400 CAPSULE ORAL at 18:16

## 2020-06-09 RX ADMIN — SODIUM CHLORIDE 75 MILLILITER(S): 9 INJECTION, SOLUTION INTRAVENOUS at 14:00

## 2020-06-09 RX ADMIN — SENNA PLUS 2 TABLET(S): 8.6 TABLET ORAL at 21:48

## 2020-06-09 NOTE — DISCHARGE NOTE PROVIDER - NSDCACTIVITY_GEN_ALL_CORE
Walking - Indoors allowed/No heavy lifting/straining/Stairs allowed/Walking - Outdoors allowed/Do not drive or operate machinery/Showering allowed

## 2020-06-09 NOTE — DISCHARGE NOTE PROVIDER - CARE PROVIDERS DIRECT ADDRESSES
[email or web address]

## 2020-06-09 NOTE — PHYSICAL THERAPY INITIAL EVALUATION ADULT - GENERAL OBSERVATIONS, REHAB EVAL
pt received semisupine in bed, +IV, +SCDs pt received semisupine in bed, +IV, +SCDs, +Tele, +supplemental oxygen

## 2020-06-09 NOTE — PHYSICAL THERAPY INITIAL EVALUATION ADULT - GAIT DEVIATIONS NOTED, PT EVAL
decreased sidney/decreased step length/decreased weight-shifting ability/decreased velocity of limb motion

## 2020-06-09 NOTE — DISCHARGE NOTE PROVIDER - NSDCCPCAREPLAN_GEN_ALL_CORE_FT
PRINCIPAL DISCHARGE DIAGNOSIS  Diagnosis: Status post total replacement of left hip  Assessment and Plan of Treatment: PRINCIPAL DISCHARGE DIAGNOSIS  Diagnosis: Status post total replacement of left hip  Assessment and Plan of Treatment: You have had an Anterior Total Hip Replacement. Follow instructions given to you by your surgeon.   PT/OT Total Hip Protocol; Ambulation, transfers, stairs, & ADLs  Full weight bearing both legs; Walker/cane use as instructed by PT/OT  Anterior THR precautions for 4 weeks: No straight leg raise; No external rotation of hip when extended-standing or lying flat; No hyperextension of hip when standing (kickback)  Ice packs to hip  Dressing is water resistant.   May shower.  Pat dry after shower. Do not aim shower at surgical site.  Dressing removal on/near after Post Op Day # 14.  See PCP in office approximately 2-3 weeks from discharge for exam/labwork. PRINCIPAL DISCHARGE DIAGNOSIS  Diagnosis: Status post total replacement of left hip  Assessment and Plan of Treatment: You have had an Anterior Total Hip Replacement. Follow instructions given to you by your surgeon.   PT/OT Total Hip Protocol; Ambulation, transfers, stairs, & ADLs  Full weight bearing both legs; Walker/cane use as instructed by PT/OT  Anterior THR precautions for 4 weeks: No straight leg raise; No external rotation of hip when extended-standing or lying flat; No hyperextension of hip when standing (kickback)  Ice packs to hip  Dressing is water resistant.   May shower.  Pat dry after shower. Do not aim shower at surgical site.  Leave dressing on until post op office visit(POD#14).   Pt is on enteric coated ASA 325mg PO BID for DVT prophylaxis take for 6 weeks unless otherwise directed by surgeon.  follow up with Dr. Pitts/Ginna Polanco PA-C in two weeks. Call Beena Hahn for appointment at [phone].  Follow up with primary care doctor in 1-2 weeks for continuity of care.

## 2020-06-09 NOTE — CONSULT NOTE ADULT - SUBJECTIVE AND OBJECTIVE BOX
HPI:  74F with HTN, BHAKTI, Iron deficiency anemia and hypothyroidism  has been combatting pain left hip for several years which has progressively worsened.  Patient has tried multiple options for pain relief including OTC medication and as well as PT with minimal relief and has undergone elective replacement of left hip successfully.  She is currently resting in bed comfortable with good pain control.     REVIEW OF SYSTEMS:  CONSTITUTIONAL: No fever, weight loss, or fatigue  EYES: No eye pain, visual disturbances, or discharge  ENMT:  No difficulty hearing, tinnitus, vertigo; No sinus or throat pain  NECK: No pain or stiffness  RESPIRATORY: No cough, wheezing, chills or hemoptysis; No shortness of breath  CARDIOVASCULAR: No chest pain, palpitations, dizziness, or leg swelling  GASTROINTESTINAL: No abdominal or epigastric pain. No nausea, vomiting, or hematemesis; No diarrhea or constipation. No melena or hematochezia.  GENITOURINARY: No dysuria, frequency, hematuria, or incontinence  NEUROLOGICAL: No headaches, memory loss, loss of strength, numbness, or tremors  MUSCULOSKELETAL: No muscle or back pain      PAST MEDICAL & SURGICAL HISTORY:  Diverticulosis  Anemia: multiple iron infusions, blood transfusions  Lumbar spinal stenosis  Seasonal allergies  H/O bronchitis  Cervical spinal stenosis  Low back pain: chronic  BHAKTI (obstructive sleep apnea): wears mouth guard  History of bunionectomy of both great toes  Cataract  Hiatal hernia  GERD (gastroesophageal reflux disease)  Hypothyroid  History of bunionectomy of both great toes  S/P LASIK surgery: left eye  History of total hip replacement, right  Status post appendectomy      SOCIAL HISTORY:  Negative for Tobacco; 1-2 drinks of EtOH per week;  Negative for  Illicit Drugs    Allergies    contrast media (iodine-based) (Anaphylaxis)    Intolerances    codeine (Other)      MEDICATIONS  (STANDING):    MEDICATIONS  (PRN):      FAMILY HISTORY:  FH: prostate cancer  FH: bladder cancer  FH: leukemia: brother      Vital Signs Last 24 Hrs  T(C): 36.8 (09 Jun 2020 08:53), Max: 36.8 (09 Jun 2020 08:53)  T(F): 98.3 (09 Jun 2020 08:53), Max: 98.3 (09 Jun 2020 08:53)  HR: 86 (09 Jun 2020 08:53) (86 - 86)  BP: 121/72 (09 Jun 2020 08:53) (121/72 - 121/72)  BP(mean): --  RR: 16 (09 Jun 2020 08:53) (16 - 16)  SpO2: 99% (09 Jun 2020 08:53) (99% - 99%)    PHYSICAL EXAM:    GENERAL: NAD, well-developed  HEAD:  Atraumatic, Normocephalic  EYES: EOMI, PERRLA, conjunctiva and sclera clear  ENMT: No tonsillar erythema, exudates, or enlargement; Moist mucous membranes, Good dentition, No lesions  NECK: Supple, No JVD, Normal thyroid  NERVOUS SYSTEM:  Alert & Oriented X3, Good concentration;  CHEST/LUNG: Clear to auscultation bilaterally; No rales, rhonchi, wheezing, or rubs  HEART: Regular rate and rhythm; No murmurs, rubs, or gallops  ABDOMEN: Soft, Nontender, Nondistended; Bowel sounds present  EXTREMITIES:  2+ Peripheral Pulses, No clubbing, cyanosis, or edema      LABS:                        x      7.10  )-----------( x        ( 09 Jun 2020 09:38 )             x                  CAPILLARY BLOOD GLUCOSE          RADIOLOGY & ADDITIONAL STUDIES:    EKG:   Personally Reviewed:  [ ] YES     Imaging:   Personally Reviewed:  [ ] YES     Consultant(s) Notes Reviewed:      Care Discussed with Consultants/Other Providers:

## 2020-06-09 NOTE — DISCHARGE NOTE PROVIDER - NSDCFUSCHEDAPPT_GEN_ALL_CORE_FT
LUCY MCDANIEL ; 07/02/2020 ; [facility] LUCY MCDANIEL ; 07/02/2020 ; [facility] LUCY MCDANIEL ; 07/02/2020 ; [facility] [address] [facility] LUCY MCDANIEL ; 07/02/2020 ; [facility] [address] [facility]

## 2020-06-09 NOTE — DISCHARGE NOTE PROVIDER - CARE PROVIDER_API CALL
Barrera Pitts J  ORTHOPAEDIC SURGERY  [address] [place] [id number]  Phone: [phone]  Fax: [phone]  Follow Up Time: 2 weeks

## 2020-06-09 NOTE — DISCHARGE NOTE PROVIDER - HOSPITAL COURSE
The patient is a 74 y.o. female with severe osteoarthritis of the left hip, admitted on 6/9/2020 for scheduled left total hip replacement by anterior approach    After admission on 6/9/2020 and receiving pre-operative parenteral prophylactic antibiotics, the patient  underwent an  uncomplicated right total hip replacement from the anterior approach by orthopedic surgeon Dr. Pitts.      Typical Physical & occupational therapy modalities post surgery were performed including ambulation training, range of motion, ADL's, abd transfers.    The patient had a stable neuro / vascular exam of the operated extremity.  After progression of mobility guided by the PT/ OT staff,  the patient was felt to benefit from further rehabilitative care for restoration to level of function. This was felt to best be accomplished at home.  Discharge and Orthopedic Care instructions were delineated in the Discharge Plan and reviewed with the patient. All medications were delineated in the medication reconciliation tool and key points were reviewed with the patient. They were deemed stable from an Orthopedic & medical standpoint for discharge today.

## 2020-06-09 NOTE — CONSULT NOTE ADULT - ASSESSMENT
74F with HTN, BHAKTI, Iron deficiency anemia and hypothyroidism admitted for aftercare following Left THR    S/P Left THR  POD 0  Continue Bowel and pain control regimen.   Incentive Spirometer for lung expansion.  Work with PT to increase ambulation as per orthopedics.  Monitor Hgb and follow up electrolytes.   Orhtopedics on board and following     HTN  Controlled. Not on any medications    Hypothyroidism  Synthroid Daily    Iron Deficiency Anemia  Preoperative Hgb =12  Had extensive GI workup with no etiology found  See Hematology as well outpatient; Iron transfusions in the past  Continue Iron supplementation and watch     Diet  Regular    DVT Prophylaxis  Asprin BID    Disposition  Full Code/Inpatient  Discharge planning pending hospital course 74F with HTN, BHAKTI, Iron deficiency anemia and hypothyroidism admitted for aftercare following Left THR    S/P Left THR  POD 0  Continue Bowel and pain control regimen.   Incentive Spirometer for lung expansion.  Work with PT to increase ambulation as per orthopedics.  Monitor Hgb and follow up electrolytes.   Orhtopedics on board and following     HTN  Controlled. Not on any medications    Hypothyroidism  Synthroid Daily    Iron Deficiency Anemia  Preoperative Hgb =12  Had extensive GI workup with no etiology found  See Hematology as well outpatient; Iron transfusions in the past    Diet  Regular    DVT Prophylaxis  Asprin    Disposition  Full Code/Inpatient  Discharge planning pending hospital course

## 2020-06-09 NOTE — DISCHARGE NOTE PROVIDER - NSDCMRMEDTOKEN_GEN_ALL_CORE_FT
CeleBREX 200 mg oral capsule: 1 cap(s) orally 2 times a day  Colace 100 mg oral capsule: 1 cap(s) orally 3 times a day  Ecotrin 325 mg oral delayed release tablet: 1 tab(s) orally every 12 hours for six weeks.   gabapentin 100 mg oral tablet: 1 tab(s) orally 3 times a day for two weeks   omeprazole 20 mg oral delayed release capsule: 1 cap(s) orally once a day while taking the Ecotrin.   oxyCODONE 5 mg oral tablet: 1 tab(s) orally every 6 hours for severe and breakthrough pain. Wean off medication by 2 weeks post operatively.   Senna 8.6 mg oral tablet: 1 tab(s) orally once a day (at bedtime) as needed for constipation  Synthroid 75 mcg (0.075 mg) oral tablet: 1 tab(s) orally once a day  traMADol 50 mg oral tablet: 1 tab(s) orally every 8 hours  Tylenol 500 mg oral tablet: 2 tab(s) orally every 8 hours CBC Draw on 6/17/2020: Please draw CBC on 6/17/2020 to recheck leukocytosis s/p left THR. Send results to PCP Dr. Umair Crowell [phone].  CeleBREX 100 mg oral capsule: 1 cap(s) orally 2 times a day for 14 days  Colace 100 mg oral capsule: 1 cap(s) orally 3 times a day  Ecotrin 325 mg oral delayed release tablet: 1 tab(s) orally every 12 hours for six weeks.   gabapentin 100 mg oral tablet: 1 tab(s) orally 3 times a day for two weeks   NexIUM 40 mg oral delayed release capsule: 1 tab(s) orally once a day  oxyCODONE 5 mg oral tablet: 1 tab(s) orally every 6 hours for severe and breakthrough pain. Wean off medication by 2 weeks post operatively.   Senna 8.6 mg oral tablet: 1 tab(s) orally once a day (at bedtime) as needed for constipation  Synthroid 75 mcg (0.075 mg) oral tablet: 1 tab(s) orally once a day  traMADol 50 mg oral tablet: 1 tab(s) orally every 8 hours  Tylenol 500 mg oral tablet: 2 tab(s) orally every 8 hours CBC Draw on 6/17/2020: Please draw CBC on 6/17/2020 to recheck leukocytosis s/p left THR. Send results to PCP Dr. Umair Crowell [phone].  CeleBREX 100 mg oral capsule: 1 cap(s) orally 2 times a day for 14 days  Colace 100 mg oral capsule: 1 cap(s) orally 3 times a day  Ecotrin 325 mg oral delayed release tablet: 1 tab(s) orally every 12 hours for six weeks.   gabapentin 100 mg oral tablet: 1 tab(s) orally 3 times a day for two weeks   NexIUM 40 mg oral delayed release capsule: 1 tab(s) orally once a day  oxyCODONE 5 mg oral tablet: 1 tab(s) orally every 6 hours for severe and breakthrough pain. Wean off medication by 2 weeks post operatively.   Senna 8.6 mg oral tablet: 1 tab(s) orally once a day (at bedtime) as needed for constipation  Synthroid 75 mcg (0.075 mg) oral tablet: 1 tab(s) orally once a day  traMADol 50 mg oral tablet: 1 tab(s) orally every 8 hours  Tylenol 500 mg oral tablet: 2 tab(s) orally every 8 hours

## 2020-06-10 ENCOUNTER — TRANSCRIPTION ENCOUNTER (OUTPATIENT)
Age: 75
End: 2020-06-10

## 2020-06-10 VITALS
SYSTOLIC BLOOD PRESSURE: 110 MMHG | TEMPERATURE: 99 F | DIASTOLIC BLOOD PRESSURE: 67 MMHG | HEART RATE: 87 BPM | OXYGEN SATURATION: 95 % | RESPIRATION RATE: 12 BRPM

## 2020-06-10 LAB
ANION GAP SERPL CALC-SCNC: 8 MMOL/L — SIGNIFICANT CHANGE UP (ref 5–17)
BUN SERPL-MCNC: 13 MG/DL — SIGNIFICANT CHANGE UP (ref 7–23)
CALCIUM SERPL-MCNC: 8.7 MG/DL — SIGNIFICANT CHANGE UP (ref 8.4–10.5)
CHLORIDE SERPL-SCNC: 103 MMOL/L — SIGNIFICANT CHANGE UP (ref 96–108)
CO2 SERPL-SCNC: 25 MMOL/L — SIGNIFICANT CHANGE UP (ref 22–31)
CREAT SERPL-MCNC: 0.88 MG/DL — SIGNIFICANT CHANGE UP (ref 0.5–1.3)
GLUCOSE SERPL-MCNC: 153 MG/DL — HIGH (ref 70–99)
HCT VFR BLD CALC: 31.8 % — LOW (ref 34.5–45)
HGB BLD-MCNC: 10.2 G/DL — LOW (ref 11.5–15.5)
MCHC RBC-ENTMCNC: 32.1 GM/DL — SIGNIFICANT CHANGE UP (ref 32–36)
MCHC RBC-ENTMCNC: 32.5 PG — SIGNIFICANT CHANGE UP (ref 27–34)
MCV RBC AUTO: 101.3 FL — HIGH (ref 80–100)
NRBC # BLD: 0 /100 WBCS — SIGNIFICANT CHANGE UP (ref 0–0)
PLATELET # BLD AUTO: 273 K/UL — SIGNIFICANT CHANGE UP (ref 150–400)
POTASSIUM SERPL-MCNC: 4.5 MMOL/L — SIGNIFICANT CHANGE UP (ref 3.5–5.3)
POTASSIUM SERPL-SCNC: 4.5 MMOL/L — SIGNIFICANT CHANGE UP (ref 3.5–5.3)
RBC # BLD: 3.14 M/UL — LOW (ref 3.8–5.2)
RBC # FLD: 14.6 % — HIGH (ref 10.3–14.5)
SODIUM SERPL-SCNC: 136 MMOL/L — SIGNIFICANT CHANGE UP (ref 135–145)
WBC # BLD: 14.89 K/UL — HIGH (ref 3.8–10.5)
WBC # FLD AUTO: 14.89 K/UL — HIGH (ref 3.8–10.5)

## 2020-06-10 PROCEDURE — 80048 BASIC METABOLIC PNL TOTAL CA: CPT

## 2020-06-10 PROCEDURE — 72170 X-RAY EXAM OF PELVIS: CPT

## 2020-06-10 PROCEDURE — 86900 BLOOD TYPING SEROLOGIC ABO: CPT

## 2020-06-10 PROCEDURE — 97165 OT EVAL LOW COMPLEX 30 MIN: CPT

## 2020-06-10 PROCEDURE — 85048 AUTOMATED LEUKOCYTE COUNT: CPT

## 2020-06-10 PROCEDURE — 36415 COLL VENOUS BLD VENIPUNCTURE: CPT

## 2020-06-10 PROCEDURE — 97530 THERAPEUTIC ACTIVITIES: CPT

## 2020-06-10 PROCEDURE — 87635 SARS-COV-2 COVID-19 AMP PRB: CPT

## 2020-06-10 PROCEDURE — G0463: CPT

## 2020-06-10 PROCEDURE — 76000 FLUOROSCOPY <1 HR PHYS/QHP: CPT

## 2020-06-10 PROCEDURE — 85027 COMPLETE CBC AUTOMATED: CPT

## 2020-06-10 PROCEDURE — 97161 PT EVAL LOW COMPLEX 20 MIN: CPT

## 2020-06-10 PROCEDURE — 88305 TISSUE EXAM BY PATHOLOGIST: CPT

## 2020-06-10 PROCEDURE — C1713: CPT

## 2020-06-10 PROCEDURE — 86850 RBC ANTIBODY SCREEN: CPT

## 2020-06-10 PROCEDURE — 97110 THERAPEUTIC EXERCISES: CPT

## 2020-06-10 PROCEDURE — 88311 DECALCIFY TISSUE: CPT

## 2020-06-10 PROCEDURE — 97116 GAIT TRAINING THERAPY: CPT

## 2020-06-10 PROCEDURE — 99233 SBSQ HOSP IP/OBS HIGH 50: CPT

## 2020-06-10 PROCEDURE — C1776: CPT

## 2020-06-10 PROCEDURE — 86901 BLOOD TYPING SEROLOGIC RH(D): CPT

## 2020-06-10 RX ORDER — CELECOXIB 200 MG/1
1 CAPSULE ORAL
Qty: 28 | Refills: 0
Start: 2020-06-10 | End: 2020-06-23

## 2020-06-10 RX ORDER — OMEPRAZOLE 10 MG/1
1 CAPSULE, DELAYED RELEASE ORAL
Qty: 0 | Refills: 0 | DISCHARGE

## 2020-06-10 RX ORDER — CELECOXIB 200 MG/1
1 CAPSULE ORAL
Qty: 2 | Refills: 0
Start: 2020-06-10 | End: 2020-06-10

## 2020-06-10 RX ORDER — CELECOXIB 200 MG/1
1 CAPSULE ORAL
Qty: 0 | Refills: 0 | DISCHARGE

## 2020-06-10 RX ORDER — ESOMEPRAZOLE MAGNESIUM 40 MG/1
1 CAPSULE, DELAYED RELEASE ORAL
Qty: 0 | Refills: 0 | DISCHARGE
Start: 2020-06-10

## 2020-06-10 RX ADMIN — Medication 75 MICROGRAM(S): at 05:28

## 2020-06-10 RX ADMIN — GABAPENTIN 100 MILLIGRAM(S): 400 CAPSULE ORAL at 10:32

## 2020-06-10 RX ADMIN — Medication 1000 MILLIGRAM(S): at 10:32

## 2020-06-10 RX ADMIN — Medication 15 MILLIGRAM(S): at 12:20

## 2020-06-10 RX ADMIN — Medication 400 MILLIGRAM(S): at 05:28

## 2020-06-10 RX ADMIN — Medication 325 MILLIGRAM(S): at 17:25

## 2020-06-10 RX ADMIN — Medication 325 MILLIGRAM(S): at 05:28

## 2020-06-10 RX ADMIN — Medication 100 MILLIGRAM(S): at 02:04

## 2020-06-10 RX ADMIN — Medication 1000 MILLIGRAM(S): at 17:33

## 2020-06-10 RX ADMIN — Medication 102 MILLIGRAM(S): at 10:32

## 2020-06-10 NOTE — PROGRESS NOTE ADULT - SUBJECTIVE AND OBJECTIVE BOX
Patient is a 74y old  Female who presents with a chief complaint of Total Hip replacement (10 Gómez 2020 10:39)      INTERVAL HPI/OVERNIGHT EVENTS:  Pt is seen and examined.  No new complaints.  Pain is controlled with meds.    Pain Location & Control:     MEDICATIONS  (STANDING):  acetaminophen   Tablet .. 1000 milliGRAM(s) Oral every 8 hours  aspirin enteric coated 325 milliGRAM(s) Oral every 12 hours  gabapentin 100 milliGRAM(s) Oral every 8 hours  ketorolac   Injectable 15 milliGRAM(s) IV Push every 6 hours  ketorolac   Injectable 15 milliGRAM(s) IV Push once  levothyroxine 75 MICROGram(s) Oral daily  pantoprazole    Tablet 40 milliGRAM(s) Oral before breakfast  polyethylene glycol 3350 17 Gram(s) Oral daily  senna 2 Tablet(s) Oral at bedtime  sodium chloride 0.9% lock flush 3 milliLiter(s) IV Push every 8 hours  traMADol 50 milliGRAM(s) Oral every 8 hours    MEDICATIONS  (PRN):  aluminum hydroxide/magnesium hydroxide/simethicone Suspension 30 milliLiter(s) Oral four times a day PRN Indigestion  HYDROmorphone  Injectable 0.5 milliGRAM(s) IV Push every 4 hours PRN breakthrough pain  magnesium hydroxide Suspension 30 milliLiter(s) Oral daily PRN Constipation  ondansetron Injectable 4 milliGRAM(s) IV Push every 6 hours PRN Nausea and/or Vomiting  oxyCODONE    IR 5 milliGRAM(s) Oral every 3 hours PRN Moderate Pain (4 - 6)  oxyCODONE    IR 10 milliGRAM(s) Oral every 3 hours PRN Severe Pain (7 - 10)      Allergies    contrast media (iodine-based) (Anaphylaxis)    Intolerances    codeine (Other)      Vital Signs Last 24 Hrs  T(C): 37 (10 Gómez 2020 07:51), Max: 37 (10 Gómez 2020 07:51)  T(F): 98.6 (10 Gómez 2020 07:51), Max: 98.6 (10 Gómez 2020 07:51)  HR: 95 (10 Gómez 2020 10:19) (69 - 95)  BP: 110/7 (10 Gómez 2020 10:19) (97/60 - 135/60)  BP(mean): --  RR: 17 (10 Gómez 2020 07:51) (12 - 21)  SpO2: 97% (10 Gómez 2020 10:19) (92% - 100%)        LABS:                        10.2   14.89 )-----------( 273      ( 10 Gómez 2020 05:53 )             31.8     10 Gómez 2020 05:53    136    |  103    |  13     ----------------------------<  153    4.5     |  25     |  0.88     Ca    8.7        10 Gómez 2020 05:53          RADIOLOGY & ADDITIONAL TESTS:    Imaging Personally Reviewed:  [ ] YES  [ ] NO    Consultant(s) Notes Reviewed:  [ ] YES  [ ] NO    Care Discussed with Consultants/Other Providers [x ] YES  [ ] NO

## 2020-06-10 NOTE — PROGRESS NOTE ADULT - SUBJECTIVE AND OBJECTIVE BOX
S/B Orthopaedic Attending - Barrera Pitts MD    S/P Left THR DAA - POD #1    Afebrile  Vital Signs Stable    Left Hip: Wound Dry                        No Swelling                        Nil NVD                        Pain - controlled    PA- Soft  Calves - Soft    Plan:   1. Physical Therapy  2. WBAT Walker  3. Hip Precautions - Anterior  4. Abduction Pillow as needed  5. Static Quads  6. Gluteal Sets  7. SCDS  8. Incentive spirometer 10X hour  9. Ice Compress q4h for 20 minutes  10. Elevation - 2 pillows under heels.  11. Anticoagulation - Aspirin 325mg PO q12 x 6 weeks  12. D/C Planning - Home when cleared by physical therapy   13. Office Review 2 weeks postop

## 2020-06-10 NOTE — OCCUPATIONAL THERAPY INITIAL EVALUATION ADULT - LIVES WITH, PROFILE
Pt lives with her  in a private home, 0 steps to negotiate, with a walk in shower. Pt was independent with ADLs, IADLs, functional mobility prior to admission and owns a RW, cane and commode./spouse

## 2020-06-10 NOTE — DISCHARGE NOTE NURSING/CASE MANAGEMENT/SOCIAL WORK - PATIENT PORTAL LINK FT
You can access the FollowMyHealth Patient Portal offered by [facility] by registering at the following website: [email or web address]. By joining [employer]’s FollowMyHealth portal, you will also be able to view your health information using other applications (apps) compatible with our system.

## 2020-06-10 NOTE — PHARMACOTHERAPY INTERVENTION NOTE - COMMENTS
Admission medication reconciliation POD1
Patient has history of ileal ulcer and GERD with gastritis. Postop medication regimen includes ASA EC 325mg BID, Celecoxib 100mg q12h. Add famotidine 40mg HS for additional GI mucosal protection.
Patient has history of ileal ulcer. Needs celecoxib for HO prophylaxis. Reduce celecoxib dose to 100mg q12h and add famotidine 40mg HS for additional GI mucosal protection while taking celecoxib.
Patient takes Esomeprazole (Nexium) at home. Omeprazole 20mg QAM ordered with discharge medications. DC omeprazole on discharge.
Transition of Care education at bedside - medication calendar given to patient. Pharmacy fill confirmed.

## 2020-06-10 NOTE — PROGRESS NOTE ADULT - ASSESSMENT
No 74F with HTN, BHAKTI, Iron deficiency anemia and hypothyroidism admitted for aftercare following Left THR    S/P Left THR   Continue Bowel and pain control regimen.   Incentive Spirometer for lung expansion.  Work with PT to increase ambulation as per orthopedics.  Monitor Hgb and follow up electrolytes.   Orhtopedics on board and following     HTN  Controlled. Not on any medications    Hypothyroidism  Synthroid Daily    Iron Deficiency Anemia  Preoperative Hgb =12  Had extensive GI workup with no etiology found  See Hematology as well outpatient; Iron transfusions in the past    Diet  Regular    DVT Prophylaxis  Asprin    Disposition  Full Code/Inpatient  Discharge planning pending hospital course     leucocytosis  asymptomatics. likly reactive.  trend cbc.    Plan of care was discuss with patient, all questions were answered, seems understand and in agreement.

## 2020-06-10 NOTE — DISCHARGE NOTE NURSING/CASE MANAGEMENT/SOCIAL WORK - NSSCNAMETXT_GEN_ALL_CORE
[facility]   Please contact the home care agency if you have not heard from them by 12 noon on the day after your hospital discharge.   Nurse to visit the day after hospital discharge; Rehabilitation Therapists to follow

## 2020-06-10 NOTE — OCCUPATIONAL THERAPY INITIAL EVALUATION ADULT - PRECAUTIONS/LIMITATIONS, REHAB EVAL
left hip precautions/anterior hip precautions left hip precautions/fall precautions/anterior hip precautions

## 2020-06-10 NOTE — PROGRESS NOTE ADULT - SUBJECTIVE AND OBJECTIVE BOX
Discharge medication calendar:  ASA EC 325mg BID x 6 weeks  APAP 1000mg q8h x 2-3 weeks  Celecoxib 100mg q12h x 2-3 weeks  Gabapentin 100mg TID  Tramadol 50mg TID  Nexium 40mg Qday (home med)  Famotidine 40mg HS  Oxycodone PRN  Docusate 100mg TID while taking narcotic  Miralax, Senna, or Bisacodyl PRN for treatment of constipation

## 2020-06-10 NOTE — OCCUPATIONAL THERAPY INITIAL EVALUATION ADULT - DIAGNOSIS, OT EVAL
Pt with impaired ROM, strength and balance impacting pt's ability to complete ADLs, IADLs, functional mobility.

## 2020-06-10 NOTE — PROGRESS NOTE ADULT - SUBJECTIVE AND OBJECTIVE BOX
Orthopedic P.A.- POD# 1 - s/p anterior Left THR    Patient alert and comfortable in bed.  Denies hip pain or nausea. Prefers NO narcotics due to hx of dizziness and syncope with opioids in past.    Vital Signs Last 24 Hrs  T(C): 37 (10 Gómez 2020 07:51), Max: 37 (10 Gómez 2020 07:51)  T(F): 98.6 (10 Gómez 2020 07:51), Max: 98.6 (10 Gómez 2020 07:51)  HR: 82 (10 Gómez 2020 07:51) (69 - 89)  BP: 116/69 (10 Gómez 2020 07:51) (97/60 - 135/60)  BP(mean): --  RR: 17 (10 Gómez 2020 07:51) (12 - 21)  SpO2: 92% (10 Gómez 2020 03:30) (92% - 100%)         I&O's Detail    09 Jun 2020 07:01  -  10 Gómez 2020 07:00  --------------------------------------------------------  IN:    lactated ringers.: 1000 mL    Sodium Chloride 0.9% IV Bolus: 500 mL  Total IN: 1500 mL    OUT:    Estimated Blood Loss: 300 mL in OR yesterday    Voided: 975 mL  Total OUT: 1275 mL    Total NET: 225 mL                 Labs:                          10.2<L>  14.89<H> )-----------( 273      ( 10 Gómez 2020 05:53 )             31.8<L>  10 Gómez 2020 05:53                 10 Gómez 2020 05:53    136    |  103    |  13     ----------------------------<  153<H>  4.5     |  25     |  0.88     Ca    8.7        10 Gómez 2020 05:53                    MEDICATIONS:acetaminophen   Tablet .. 1000 milliGRAM(s) Oral every 8 hours  aluminum hydroxide/magnesium hydroxide/simethicone Suspension 30 milliLiter(s) Oral four times a day PRN  aspirin enteric coated 325 milliGRAM(s) Oral every 12 hours  dexAMETHasone  IVPB 10 milliGRAM(s) IV Intermittent once  gabapentin 100 milliGRAM(s) Oral every 8 hours  HYDROmorphone  Injectable 0.5 milliGRAM(s) IV Push every 4 hours PRN  ketorolac   Injectable 15 milliGRAM(s) IV Push every 6 hours  ketorolac   Injectable 15 milliGRAM(s) IV Push once  lactated ringers. 1000 milliLiter(s) IV Continuous <Continuous>  levothyroxine 75 MICROGram(s) Oral daily  magnesium hydroxide Suspension 30 milliLiter(s) Oral daily PRN  ondansetron Injectable 4 milliGRAM(s) IV Push every 6 hours PRN  oxyCODONE    IR 5 milliGRAM(s) Oral every 3 hours PRN  oxyCODONE    IR 10 milliGRAM(s) Oral every 3 hours PRN  pantoprazole    Tablet 40 milliGRAM(s) Oral before breakfast  polyethylene glycol 3350 17 Gram(s) Oral daily  senna 2 Tablet(s) Oral at bedtime  sodium chloride 0.9% lock flush 3 milliLiter(s) IV Push every 8 hours  traMADol 50 milliGRAM(s) Oral every 8 hours    Anticoagulation:  aspirin enteric coated 325 milliGRAM(s) Oral every 12 hours started this AM      Antibiotics: complete      Pain medications:   acetaminophen   Tablet .. 1000 milliGRAM(s) Oral every 8 hours  gabapentin 100 milliGRAM(s) Oral every 8 hours  HYDROmorphone  Injectable 0.5 milliGRAM(s) IV Push every 4 hours PRN  ketorolac   Injectable 15 milliGRAM(s) IV Push every 6 hours  ketorolac   Injectable 15 milliGRAM(s) IV Push once  ondansetron Injectable 4 milliGRAM(s) IV Push every 6 hours PRN  oxyCODONE    IR 5 milliGRAM(s) Oral every 3 hours PRN  oxyCODONE    IR 10 milliGRAM(s) Oral every 3 hours PRN  traMADol 50 milliGRAM(s) Oral every 8 hours                                       Physical Exam:  Left hip-   Primary surgical bandage dry and intact.   Neurovascular grossly intact LE's.  EHL/ant.tibs 5/5.  PAS on LE's.  Calves soft and non-tender.                                                                                                                                                        A/P:  Orthopedically stable.  -Continue pain management with above plan as per Dr. Avila protocol (patient refusing Tramadol and oxycodone)  -DVT prophylaxis with PAS in bed and Ecotrin 325mg po every 12 hours for 6 weeks.  -Labs stable today; REcheck in AM  -PT/OT to increase ambulation and review anterior dislocation precautions  -Dr. Harley for medical care today.  -Discharge planning for Home possible today if cleared by PT/OT for safe ambulation and stairs  -Further plan as per attendings. Orthopedic P.A.- POD# 1 - s/p anterior Left THR    Patient alert and comfortable in bed.  Denies hip pain or nausea. Prefers NO narcotics due to hx of dizziness and syncope with opioids in past.  Saw patient with Dr. Avila this morning.    Vital Signs Last 24 Hrs  T(C): 37 (10 Gómez 2020 07:51), Max: 37 (10 Gómez 2020 07:51)  T(F): 98.6 (10 Gómez 2020 07:51), Max: 98.6 (10 Gómez 2020 07:51)  HR: 82 (10 Gómez 2020 07:51) (69 - 89)  BP: 116/69 (10 Gómez 2020 07:51) (97/60 - 135/60)  BP(mean): --  RR: 17 (10 Gómez 2020 07:51) (12 - 21)  SpO2: 92% (10 Gómez 2020 03:30) (92% - 100%)         I&O's Detail    09 Jun 2020 07:01  -  10 Gómez 2020 07:00  --------------------------------------------------------  IN:    lactated ringers.: 1000 mL    Sodium Chloride 0.9% IV Bolus: 500 mL  Total IN: 1500 mL    OUT:    Estimated Blood Loss: 300 mL in OR yesterday    Voided: 975 mL  Total OUT: 1275 mL    Total NET: 225 mL                 Labs:                          10.2<L>  14.89<H> )-----------( 273      ( 10 Gómez 2020 05:53 )             31.8<L>  10 Gómez 2020 05:53                 10 Gómez 2020 05:53    136    |  103    |  13     ----------------------------<  153<H>  4.5     |  25     |  0.88     Ca    8.7        10 Gómez 2020 05:53                    MEDICATIONS:acetaminophen   Tablet .. 1000 milliGRAM(s) Oral every 8 hours  aluminum hydroxide/magnesium hydroxide/simethicone Suspension 30 milliLiter(s) Oral four times a day PRN  aspirin enteric coated 325 milliGRAM(s) Oral every 12 hours  dexAMETHasone  IVPB 10 milliGRAM(s) IV Intermittent once  gabapentin 100 milliGRAM(s) Oral every 8 hours  HYDROmorphone  Injectable 0.5 milliGRAM(s) IV Push every 4 hours PRN  ketorolac   Injectable 15 milliGRAM(s) IV Push every 6 hours  ketorolac   Injectable 15 milliGRAM(s) IV Push once  lactated ringers. 1000 milliLiter(s) IV Continuous <Continuous>  levothyroxine 75 MICROGram(s) Oral daily  magnesium hydroxide Suspension 30 milliLiter(s) Oral daily PRN  ondansetron Injectable 4 milliGRAM(s) IV Push every 6 hours PRN  oxyCODONE    IR 5 milliGRAM(s) Oral every 3 hours PRN  oxyCODONE    IR 10 milliGRAM(s) Oral every 3 hours PRN  pantoprazole    Tablet 40 milliGRAM(s) Oral before breakfast  polyethylene glycol 3350 17 Gram(s) Oral daily  senna 2 Tablet(s) Oral at bedtime  sodium chloride 0.9% lock flush 3 milliLiter(s) IV Push every 8 hours  traMADol 50 milliGRAM(s) Oral every 8 hours    Anticoagulation:  aspirin enteric coated 325 milliGRAM(s) Oral every 12 hours started this AM      Antibiotics: complete      Pain medications:   acetaminophen   Tablet .. 1000 milliGRAM(s) Oral every 8 hours  gabapentin 100 milliGRAM(s) Oral every 8 hours  HYDROmorphone  Injectable 0.5 milliGRAM(s) IV Push every 4 hours PRN  ketorolac   Injectable 15 milliGRAM(s) IV Push every 6 hours  ketorolac   Injectable 15 milliGRAM(s) IV Push once  ondansetron Injectable 4 milliGRAM(s) IV Push every 6 hours PRN  oxyCODONE    IR 5 milliGRAM(s) Oral every 3 hours PRN  oxyCODONE    IR 10 milliGRAM(s) Oral every 3 hours PRN  traMADol 50 milliGRAM(s) Oral every 8 hours                                       Physical Exam:  Left hip-   Primary surgical bandage dry and intact.   Neurovascular grossly intact LE's.  EHL/ant.tibs 5/5.  PAS on LE's.  Calves soft and non-tender.                                                                                                                                                        A/P:  Orthopedically stable.  -Continue pain management with above plan as per Dr. Avila protocol (patient refusing Tramadol and oxycodone)  -DVT prophylaxis with PAS in bed and Ecotrin 325mg po every 12 hours for 6 weeks.  -Labs stable today; REcheck in AM  -PT/OT to increase ambulation and review anterior dislocation precautions  -Dr. Harley for medical care today.  -Discharge planning for Home possible today if cleared by PT/OT for safe ambulation and stairs  -Further plan as per attendings. Orthopedic P.A.- POD# 1 - s/p anterior Left THR    Patient alert and comfortable in bed.  Denies hip pain or nausea. Prefers NO narcotics due to hx of dizziness and syncope with opioids in past.  Saw patient with Dr. Pitts this morning.    Vital Signs Last 24 Hrs  T(C): 37 (10 Gómez 2020 07:51), Max: 37 (10 Gómez 2020 07:51)  T(F): 98.6 (10 Gómez 2020 07:51), Max: 98.6 (10 Gómez 2020 07:51)  HR: 82 (10 Gómez 2020 07:51) (69 - 89)  BP: 116/69 (10 Gómez 2020 07:51) (97/60 - 135/60)  BP(mean): --  RR: 17 (10 Gómez 2020 07:51) (12 - 21)  SpO2: 92% (10 Gómez 2020 03:30) (92% - 100%)         I&O's Detail    09 Jun 2020 07:01  -  10 Gómez 2020 07:00  --------------------------------------------------------  IN:    lactated ringers.: 1000 mL    Sodium Chloride 0.9% IV Bolus: 500 mL  Total IN: 1500 mL    OUT:    Estimated Blood Loss: 300 mL in OR yesterday    Voided: 975 mL  Total OUT: 1275 mL    Total NET: 225 mL                 Labs:                          10.2<L>  14.89<H> )-----------( 273      ( 10 Gómez 2020 05:53 )             31.8<L>  10 Gómez 2020 05:53                 10 Gómez 2020 05:53    136    |  103    |  13     ----------------------------<  153<H>  4.5     |  25     |  0.88     Ca    8.7        10 Gómez 2020 05:53                    MEDICATIONS:acetaminophen   Tablet .. 1000 milliGRAM(s) Oral every 8 hours  aluminum hydroxide/magnesium hydroxide/simethicone Suspension 30 milliLiter(s) Oral four times a day PRN  aspirin enteric coated 325 milliGRAM(s) Oral every 12 hours  dexAMETHasone  IVPB 10 milliGRAM(s) IV Intermittent once  gabapentin 100 milliGRAM(s) Oral every 8 hours  HYDROmorphone  Injectable 0.5 milliGRAM(s) IV Push every 4 hours PRN  ketorolac   Injectable 15 milliGRAM(s) IV Push every 6 hours  ketorolac   Injectable 15 milliGRAM(s) IV Push once  lactated ringers. 1000 milliLiter(s) IV Continuous <Continuous>  levothyroxine 75 MICROGram(s) Oral daily  magnesium hydroxide Suspension 30 milliLiter(s) Oral daily PRN  ondansetron Injectable 4 milliGRAM(s) IV Push every 6 hours PRN  oxyCODONE    IR 5 milliGRAM(s) Oral every 3 hours PRN  oxyCODONE    IR 10 milliGRAM(s) Oral every 3 hours PRN  pantoprazole    Tablet 40 milliGRAM(s) Oral before breakfast  polyethylene glycol 3350 17 Gram(s) Oral daily  senna 2 Tablet(s) Oral at bedtime  sodium chloride 0.9% lock flush 3 milliLiter(s) IV Push every 8 hours  traMADol 50 milliGRAM(s) Oral every 8 hours    Anticoagulation:  aspirin enteric coated 325 milliGRAM(s) Oral every 12 hours started this AM      Antibiotics: complete      Pain medications:   acetaminophen   Tablet .. 1000 milliGRAM(s) Oral every 8 hours  gabapentin 100 milliGRAM(s) Oral every 8 hours  HYDROmorphone  Injectable 0.5 milliGRAM(s) IV Push every 4 hours PRN  ketorolac   Injectable 15 milliGRAM(s) IV Push every 6 hours  ketorolac   Injectable 15 milliGRAM(s) IV Push once  ondansetron Injectable 4 milliGRAM(s) IV Push every 6 hours PRN  oxyCODONE    IR 5 milliGRAM(s) Oral every 3 hours PRN  oxyCODONE    IR 10 milliGRAM(s) Oral every 3 hours PRN  traMADol 50 milliGRAM(s) Oral every 8 hours                                       Physical Exam:  Left hip-   Primary surgical bandage dry and intact.   Neurovascular grossly intact LE's.  EHL/ant.tibs 5/5.  PAS on LE's.  Calves soft and non-tender.                                                                                                                                                        A/P:  Orthopedically stable.  -Continue pain management with above plan as per Dr. Avila protocol (patient refusing Tramadol and oxycodone)  -DVT prophylaxis with PAS in bed and Ecotrin 325mg po every 12 hours for 6 weeks.  -Labs stable today; REcheck in AM  -PT/OT to increase ambulation and review anterior dislocation precautions  -Dr. Harley for medical care today.  -Discharge planning for Home possible today if cleared by PT/OT for safe ambulation and stairs  -Further plan as per attendings.

## 2020-06-11 PROBLEM — D64.9 ANEMIA, UNSPECIFIED: Chronic | Status: ACTIVE | Noted: 2020-02-25

## 2020-06-24 ENCOUNTER — OUTPATIENT (OUTPATIENT)
Dept: OUTPATIENT SERVICES | Facility: HOSPITAL | Age: 75
LOS: 1 days | End: 2020-06-24
Payer: MEDICARE

## 2020-06-24 ENCOUNTER — APPOINTMENT (OUTPATIENT)
Dept: ORTHOPEDIC SURGERY | Facility: CLINIC | Age: 75
End: 2020-06-24
Payer: MEDICARE

## 2020-06-24 ENCOUNTER — APPOINTMENT (OUTPATIENT)
Dept: ULTRASOUND IMAGING | Facility: IMAGING CENTER | Age: 75
End: 2020-06-24
Payer: MEDICARE

## 2020-06-24 VITALS
DIASTOLIC BLOOD PRESSURE: 75 MMHG | HEIGHT: 61 IN | BODY MASS INDEX: 30.21 KG/M2 | SYSTOLIC BLOOD PRESSURE: 143 MMHG | HEART RATE: 109 BPM | WEIGHT: 160 LBS

## 2020-06-24 DIAGNOSIS — Z96.642 PRESENCE OF LEFT ARTIFICIAL HIP JOINT: ICD-10-CM

## 2020-06-24 DIAGNOSIS — R60.0 LOCALIZED EDEMA: ICD-10-CM

## 2020-06-24 DIAGNOSIS — Z98.890 OTHER SPECIFIED POSTPROCEDURAL STATES: Chronic | ICD-10-CM

## 2020-06-24 DIAGNOSIS — Z96.641 PRESENCE OF RIGHT ARTIFICIAL HIP JOINT: Chronic | ICD-10-CM

## 2020-06-24 DIAGNOSIS — Z90.49 ACQUIRED ABSENCE OF OTHER SPECIFIED PARTS OF DIGESTIVE TRACT: Chronic | ICD-10-CM

## 2020-06-24 PROCEDURE — 99024 POSTOP FOLLOW-UP VISIT: CPT

## 2020-06-24 PROCEDURE — 93971 EXTREMITY STUDY: CPT

## 2020-06-24 PROCEDURE — 93971 EXTREMITY STUDY: CPT | Mod: 26,LT

## 2020-06-24 PROCEDURE — 73502 X-RAY EXAM HIP UNI 2-3 VIEWS: CPT | Mod: LT

## 2020-06-24 NOTE — HISTORY OF PRESENT ILLNESS
[Doing Well] : is doing well [Adequate Pain Control] : has adequate pain control [No Sign of Infection] : is showing no signs of infection [de-identified] : 6/9/2020 Left total hip replacement  [de-identified] : left hip: Walks with left stiff hip gait. The Mepilex dressing was removed today, incision cleaned with alcohol, new steri strips applied to the incision. There is a well healing scar of surgery with no significant swelling, redness, or tenderness. Range of motion of the hip: active SLR of 30 degrees and hip flexion to 60 degrees Abduction 30 degrees adduction 15 degrees external rotation 30 degrees internal rotation 15 degrees with hip abductor extensor power grade +4. there is extensive bruising, and extensive edema of the left LE. There is a positive homans\par  [de-identified] : LUCY MCDANIEL is doing well s/p left total hip replacement.  she  is participating in home physical therapy, as well as a home exercise program daily. she  is taking tylenol for pain and pain level is controlled. she  is taking ecotrin for DVT ppx.\par  [de-identified] : AP and false profile views of the left hip and AP view of the pelvis taken today reveal a well fixed and aligned left total hip replacement with no signs of mechanical failure or periprosthetic fracture.\par \par  [de-identified] : The patient was informed of the findings and recommended conservative management in the form of a home exercise program, activity modifications, stationary bicycling, and ambulation with a cane.  A prescription for a course of physical therapy was provided with anterior precautions.  She does not need refills. she will take aspirin for DVT prophylaxis for another four weeks.  The risks, benefits, and side effects were discussed.  Follow up appointment was recommended in six weeks.\par Concerning the swelling, I have recommended a venous doppler to rule out DVT of the left LE. We will follow up on the result, and address the DVT ppx/treatment if needed. She has been recommended on decreasing her sitting, and increasing her elevation. She is now understanding\par

## 2020-07-02 ENCOUNTER — APPOINTMENT (OUTPATIENT)
Dept: ORTHOPEDIC SURGERY | Facility: CLINIC | Age: 75
End: 2020-07-02

## 2020-07-27 ENCOUNTER — APPOINTMENT (OUTPATIENT)
Dept: ORTHOPEDIC SURGERY | Facility: CLINIC | Age: 75
End: 2020-07-27
Payer: MEDICARE

## 2020-07-27 VITALS — TEMPERATURE: 97.8 F

## 2020-07-27 DIAGNOSIS — Z96.642 PRESENCE OF LEFT ARTIFICIAL HIP JOINT: ICD-10-CM

## 2020-07-27 PROCEDURE — 73502 X-RAY EXAM HIP UNI 2-3 VIEWS: CPT | Mod: LT

## 2020-07-27 PROCEDURE — 99024 POSTOP FOLLOW-UP VISIT: CPT

## 2020-07-29 PROBLEM — Z96.642 STATUS POST LEFT HIP REPLACEMENT: Status: ACTIVE | Noted: 2020-07-29

## 2020-08-06 ENCOUNTER — APPOINTMENT (OUTPATIENT)
Dept: ORTHOPEDIC SURGERY | Facility: CLINIC | Age: 75
End: 2020-08-06
Payer: MEDICARE

## 2020-08-06 VITALS — HEART RATE: 87 BPM | SYSTOLIC BLOOD PRESSURE: 125 MMHG | DIASTOLIC BLOOD PRESSURE: 65 MMHG

## 2020-08-06 PROCEDURE — 99024 POSTOP FOLLOW-UP VISIT: CPT

## 2020-09-07 ENCOUNTER — FORM ENCOUNTER (OUTPATIENT)
Age: 75
End: 2020-09-07

## 2020-10-22 ENCOUNTER — APPOINTMENT (OUTPATIENT)
Dept: ORTHOPEDIC SURGERY | Facility: CLINIC | Age: 75
End: 2020-10-22
Payer: MEDICARE

## 2020-10-22 VITALS
HEART RATE: 108 BPM | WEIGHT: 159 LBS | SYSTOLIC BLOOD PRESSURE: 130 MMHG | BODY MASS INDEX: 30.02 KG/M2 | HEIGHT: 61 IN | DIASTOLIC BLOOD PRESSURE: 77 MMHG

## 2020-10-22 DIAGNOSIS — M70.71 OTHER BURSITIS OF HIP, RIGHT HIP: ICD-10-CM

## 2020-10-22 DIAGNOSIS — M25.571 PAIN IN RIGHT ANKLE AND JOINTS OF RIGHT FOOT: ICD-10-CM

## 2020-10-22 DIAGNOSIS — Z96.641 PRESENCE OF RIGHT ARTIFICIAL HIP JOINT: ICD-10-CM

## 2020-10-22 PROCEDURE — 99213 OFFICE O/P EST LOW 20 MIN: CPT

## 2020-10-22 PROCEDURE — 73610 X-RAY EXAM OF ANKLE: CPT | Mod: RT

## 2020-10-22 PROCEDURE — 73521 X-RAY EXAM HIPS BI 2 VIEWS: CPT

## 2020-10-27 ENCOUNTER — APPOINTMENT (OUTPATIENT)
Dept: ORTHOPEDIC SURGERY | Facility: CLINIC | Age: 75
End: 2020-10-27
Payer: MEDICARE

## 2020-10-27 DIAGNOSIS — M54.12 RADICULOPATHY, CERVICAL REGION: ICD-10-CM

## 2020-10-27 PROCEDURE — 72040 X-RAY EXAM NECK SPINE 2-3 VW: CPT

## 2020-10-27 PROCEDURE — 99215 OFFICE O/P EST HI 40 MIN: CPT

## 2020-10-27 PROCEDURE — 72100 X-RAY EXAM L-S SPINE 2/3 VWS: CPT

## 2020-11-04 ENCOUNTER — RESULT REVIEW (OUTPATIENT)
Age: 75
End: 2020-11-04

## 2020-11-04 ENCOUNTER — APPOINTMENT (OUTPATIENT)
Dept: MRI IMAGING | Facility: CLINIC | Age: 75
End: 2020-11-04
Payer: MEDICARE

## 2020-11-04 ENCOUNTER — OUTPATIENT (OUTPATIENT)
Dept: OUTPATIENT SERVICES | Facility: HOSPITAL | Age: 75
LOS: 1 days | End: 2020-11-04
Payer: MEDICARE

## 2020-11-04 DIAGNOSIS — Z96.641 PRESENCE OF RIGHT ARTIFICIAL HIP JOINT: Chronic | ICD-10-CM

## 2020-11-04 DIAGNOSIS — Z00.8 ENCOUNTER FOR OTHER GENERAL EXAMINATION: ICD-10-CM

## 2020-11-04 DIAGNOSIS — Z98.890 OTHER SPECIFIED POSTPROCEDURAL STATES: Chronic | ICD-10-CM

## 2020-11-04 DIAGNOSIS — Z90.49 ACQUIRED ABSENCE OF OTHER SPECIFIED PARTS OF DIGESTIVE TRACT: Chronic | ICD-10-CM

## 2020-11-04 PROCEDURE — 72148 MRI LUMBAR SPINE W/O DYE: CPT

## 2020-11-04 PROCEDURE — 72141 MRI NECK SPINE W/O DYE: CPT | Mod: 26

## 2020-11-04 PROCEDURE — 72148 MRI LUMBAR SPINE W/O DYE: CPT | Mod: 26

## 2020-11-04 PROCEDURE — 72141 MRI NECK SPINE W/O DYE: CPT

## 2020-11-10 ENCOUNTER — APPOINTMENT (OUTPATIENT)
Dept: ORTHOPEDIC SURGERY | Facility: CLINIC | Age: 75
End: 2020-11-10
Payer: MEDICARE

## 2020-11-10 VITALS — TEMPERATURE: 98.1 F

## 2020-11-10 DIAGNOSIS — M41.9 SCOLIOSIS, UNSPECIFIED: ICD-10-CM

## 2020-11-10 PROCEDURE — 99214 OFFICE O/P EST MOD 30 MIN: CPT

## 2020-11-10 NOTE — HISTORY OF PRESENT ILLNESS
[Prolonged Standing] : worsened by prolonged standing [Standing] : worsened by standing [Rest] : relieved by rest [de-identified] : Pt with cervical and lumbar stenosis and spondylolisthesis, presents today for f/u results of cervical and lumbar spine MRI.  Pain radiates intermittently to LUE to elbow and at times to wrist, associated with  numbness,tingling. Low back pain radiates to anterior posterior aspect of B/L LE to ankle R>L associated with numbness,tingling on B/L LE Pt denies  weakness on B/L UE or LE. Pt uses Lidoderm patches, this provides mild relief in pain. Pt does not participate with PT at this time, last session on 08/2020, this provided mild relief in pain . Pt f/u with Dr Pitts, s/p left total hip replacement 6/9/2020, and right hip pain. Pt denies having GABY in the past. Pt denies fever, chills, weight changes, loss of bladder control, bowel incontinence or urinary retention or saddle anesthesia [Bending] : not worsened by bending [Ataxia] : no ataxia [Incontinence] : no incontinence [Loss of Dexterity] : good dexterity [Urinary Ret.] : no urinary retention

## 2020-11-10 NOTE — PHYSICAL EXAM
[UE/LE] : Sensory: Intact in bilateral upper & lower extremities [ALL] : dorsalis pedis, posterior tibial, femoral, popliteal, and radial 2+ and symmetric bilaterally [Normal] : Oriented to person, place, and time, insight and judgement were intact and the affect was normal [Lovell's Sign] : negative Lovell's sign [Pronator Drift] : negative pronator drift [SLR] : negative straight leg raise [de-identified] : Cervical ROM: Full, mild pain\par Lumbar ROM: Limited, mild pain\par TTP C/L spine, b/l paracervicals and b/l paraspinals L region. \par Skin intact C/L spine. No rashes, ulcers, blisters. \par No lymphedema. \par  [de-identified] : EXAM: MR SPINE CERVICAL\par \par \par PROCEDURE DATE: 11/04/2020\par \par \par \par INTERPRETATION: CERVICAL SPINE MRI\par \par CLINICAL INFORMATION: Neck pain. Cervical stenosis.\par TECHNIQUE: Multiplanar, multisequence MRI was obtained of the cervical spine.\par COMPARISON: None available.\par \par FINDINGS:\par \par ALIGNMENT: Straightening of the normal cervical lordosis. Grade 1 anterolisthesis of C4 on C5 and C7 on T1.\par MARROW: Vertebral body heights are maintained. Degenerative endplate changes are seen at the C6-7 level.\par CORD: Normal in caliber and signal characteristics.\par DISC SPACES: Loss of intervertebral disc height at the C3-T2 levels.\par IMAGED BRAIN: Craniocervical junction is within normal limits.\par PERIPHERAL/NECK SOFT TISSUES: Within normal limits.\par \par DISC LEVEL EVALUATION:\par \par C2/C3: No central stenosis or neural foraminal narrowing.\par C3/C4: Disc bulge with posterior osseous ridging, uncovertebral spurring, and facet arthropathy combine to cause mild central stenosis with mild right neural foraminal narrowing. No left neural foraminal narrowing.\par C4/C5: Disc bulge, facet arthropathy, and uncovertebral spurring causes mild central stenosis with moderate bilateral neural foraminal narrowing.\par C5/C6: Disc bulge with posterior osseous ridging and uncovertebral spurring causing mild central stenosis with moderate left neural foraminal narrowing. No right neural foraminal narrowing.\par C6/C7: Disc bulge with posterior osseous ridging and uncovertebral spurring causing mild right and moderate left neural foraminal narrowing. No central stenosis.\par C7/T1: Disc bulge with superimposed central disc protrusion. No central stenosis or neural foraminal narrowing.\par \par \par IMPRESSION:\par 1. Grade 1 anterolisthesis of C4 on C5 and C7 on T1.\par 2. Multilevel spondylosis of the cervical spine, as described above.\par \par \par KIA ARELLANO MD; Attending Radiologist\par This document has been electronically signed. Nov 5 2020 3:11PM\par \par \par EXAM: MR SPINE LUMBAR\par \par \par PROCEDURE DATE: 11/04/2020\par \par \par \par INTERPRETATION: LUMBOSACRAL SPINE MRI\par \par CLINICAL INFORMATION: Lumbar stenosis, back pain.\par TECHNIQUE: Multiplanar, multisequence MR imaging was obtained of the lumbosacral spine. No IV contrast.\par COMPARISONS: CT chest, abdomen and pelvis 10/30/2019.\par \par FINDINGS:\par \par DISC LEVEL EVALUATION:\par T12-L1: Limited evaluation on sagittal images only. No disc herniation, central canal stenosis, or neural foraminal stenosis.\par L1/L2: Severe disc degenerative changes with loss of disc height, diffuse disc bulge. Moderate bilateral facet arthrosis with small fluid in the right facet joint. Moderate central canal stenosis. Moderate left neural foraminal stenosis and mild right neural foraminal stenosis.\par L2/L3: Severe disc degenerative changes with loss of disc height, diffuse disc bulge and osteophytes. Mild bilateral facet arthrosis. No central canal stenosis. Moderate to severe bilateral neural foraminal stenosis.\par L3/L4: Severe disc degenerative changes with loss of disc height, diffuse disc bulge with osteophytes. Moderate bilateral facet arthrosis with small fluid in bilateral facet joints. Mild to moderate central canal stenosis. Severe bilateral neural foraminal stenosis.\par L4/L5: Severe disc degenerative changes with loss of disc height, diffuse disc bulge with osteophytes. Grade 1 anterolisthesis of L4 on L5 with uncovering of the posterior disc. Severe bilateral facet arthrosis with trace fluid in the right facet joint. Thickening of the ligamentum flavum. Severe central canal stenosis with mass effect on nerve roots in the central canal. Severe bilateral neural foraminal stenosis. Disc material in the right foraminal region contacts/compresses the exiting nerve root. Disc osteophyte in the left foraminal region is in close proximity to exiting nerve root.\par L5/S1: Diffuse disc bulge with more focal disc protrusion in the central and left paracentral region. Severe bilateral facet arthrosis with small fluid in bilateral facet joints. Mild narrowing of the central canal. Severe left and mild right neural foraminal stenosis.\par \par SPINAL ALIGNMENT: See above. Mild levoscoliosis. Mild left lateral listhesis of L2 relative to L1. Mild right lateral listhesis of L2 relative to L3. Mild left lateral listhesis of L4 relative to L5.\par DISTAL CORD AND CONUS: No abnormal edema in the distal cord. Conus terminates at the L2 level.\par SI JOINTS: Grossly unremarkable.\par MARROW: Endplate degenerative changes at multiple levels. Mild endplate edema at L1-L2, may be degenerative.\par PARASPINAL MUSCLE AND SOFT TISSUES: Mild atrophy of the posterior paraspinal muscles.\par INTRAABDOMINAL/INTRAPELVIC SOFT TISSUES: Limited field of view images demonstrate colonic diverticula.\par \par IMPRESSION: Multilevel severe spondylosis and facet arthropathy as described above. Mild levoscoliosis. Grade 1 anterolisthesis of L4 on L5. Severe central canal stenosis at L4-L5, moderate central canal stenosis at L1-L2. Mild to moderate central canal stenosis at L3-L4 and mild central canal stenosis at L5-S1.\par At L4-L5: Severe disc degenerative changes. Severe bilateral facet arthrosis with trace fluid in the right facet joint. Thickening of the ligamentum flavum. Severe central canal stenosis with mass effect on nerve roots in the central canal. Severe bilateral neural foraminal stenosis. Disc material in the right foraminal region contacts/compresses the exiting nerve root. Disc osteophyte in the left foraminal region is in close proximity to exiting nerve root.\par At L3-L4: Severe disc degenerative changes. Moderate bilateral facet arthrosis with small fluid in bilateral facet joints. Mild to moderate central canal stenosis. Severe bilateral neural foraminal stenosis.\par At L5-S1: Diffuse disc bulge with more focal disc protrusion in the central and left paracentral region. Severe bilateral facet arthrosis with small fluid in bilateral facet joints. Mild narrowing of the central canal. Severe left and mild right neural foraminal stenosis.\par \par \par TADEO DINERO MD; Attending Radiologist\par This document has been electronically signed. Nov 5 2020 2:43PM\par \par \par 2 views AP and Lat of Cervical Spine from occipital to C7/T1 and  Lumbar Spine from [id number] 10/27/20. Read and dictated by Dr. Yung Gonzalez Board Certified Orthopaedic surgeon L4-L5 Spondylolisthesis, and cervical spondylosis\par \par

## 2020-11-10 NOTE — DISCUSSION/SUMMARY
[de-identified] : 74 yo female with cervical and lumbar stenosis and spondylolisthesis, recommend pain consult. RTO 6 weeks. \par \par Diagnosis, prognosis, natural history and treatment was discussed with patient. Patient was advised if the following symptoms develop: chills, fever, \par loss of bladder control, bowel incontinence or urinary retention, numbness/tingling or weakness is present in upper or lower extremities, to go to the nearest emergency room. This may be a new clinical condition not present at the time of the patient visit  that may lead to paralysis and/or death, Patient advised if the above symptoms developed to also call the office immediately to inform us and to go to the nearest emergency room.\par \par

## 2021-01-11 PROBLEM — M70.71 BURSITIS OF HIP, RIGHT: Status: ACTIVE | Noted: 2021-01-11

## 2021-01-11 PROBLEM — Z96.641 S/P HIP REPLACEMENT, RIGHT: Status: ACTIVE | Noted: 2018-09-04

## 2021-01-11 NOTE — HISTORY OF PRESENT ILLNESS
[de-identified] : Ms. LUCY MCDANIEL is a 75 year old female presents s/p left total hip replacement 6/9/2020, here for evaluation of right ankle shin pain, along with right hip pain. She has a history of right total hip replacement 5 years ago with an outside physician. She notes something fell onto her right shin a couple weeks ago, with continued pain to the shin with associated bruising. She also notes pain localized to the right hip along the lateral aspect. She denies dislocation following a fall. She has been taking over-the-counter medication for pain with only mild relief. She returns today for evaluation of her right hip right ankle and right shin.

## 2021-01-11 NOTE — PHYSICAL EXAM
[de-identified] : On general examination the patient is adequately groomed and nourished. The vital parameters are as recorded. \par There is no lymphedema or diffuse swelling, no varicose veins, no skin warmth/erythema/scars/swelling, no ulcers and no palpable lymph nodes or masses in both lower extremities. Bilateral pedal pulses are well palpable.\par Upper Extremity:\par Both right and left upper extremities are unremarkable in terms of skin rash, lesions, pigmentation, redness, tenderness, swelling, joint instability, abnormal deformity or crepitus. The overall range of motion, sensation, motor tone and strength testing are normal.\par \par Left  hip: There is a well healed scar of surgery with no significant swelling, redness, or tenderness. Range of motion of the hip: active SLR of 30 degrees and hip flexion to 60 degrees Abduction 30 degrees adduction 15 degrees external rotation 30 degrees internal rotation 15 degrees with hip abductor extensor power grade +4. \par \par right Hip Exam:\par The gait and station is normal\par The patient has equal leg lengths and no pelvic tilt. Kristopher/David test is 7 inches on the right and 7 inches on the left. Active SLR is 60 degrees on the right and 60 degrees on the left. Both hips demonstrate no scars and the skin has no signs of inflammation. there is tenderness to palpation along the right greater trochanteric region.\par Both Hips have a normal range of motion of flexion to 100 degrees, abduction 40 degrees, adduction 20 degrees, external rotation 40 degrees, internal rotation 20 degrees with symmetrical motion in flexion and extension. There is no flexion contracture, deformity or instability. Labral impingement tests are negative.\par Both hips flexor, abductor and extensor power is normal.\par here is tenderness to palpation along the right shin. With no obvious deformity.There is tenderness to palpation along the right ATFL.There is normal range of motion of the right ankle only slightly limited to pain. there is no swelling or obvious deformity.\par \par  [de-identified] : The following radiographs were ordered and read by me during this patients visit. I reviewed each radiograph in detail with the patient and discussed the findings as highlighted below. \par AP and false profile views of the Left hip and AP view of the pelvis taken today reveal a well fixed and aligned LEFT total hip replacement with no signs of mechanical failure or periprosthetic fracture.\par AP, and false profile views of the right hip and AP view of the pelvis taken today reveal a well fixed and stable right total hip replacement with no evidence of progressive fracture or dislocation.\par 3 views of the right ankle taken today reviewed are within normal limits, with no evidence of fracture.

## 2021-01-11 NOTE — REVIEW OF SYSTEMS
[Joint Pain] : joint pain [Negative] : Heme/Lymph [FreeTextEntry9] : right hip,right shin right ankle

## 2021-01-11 NOTE — DISCUSSION/SUMMARY
[de-identified] : 75-year-old female status post right total hip replacement an outside facility and left total hip replacement June 9, 2020, status post recent fall with right hip greater trochanteric bursitis, and right ankle sprain. \par \par She has been reassured that her hip replacements remain stable with no evidence of periprosthetic fracture. At this time she has been recommended for conservative treatment with physical therapy and use of anti-inflammatories as needed for pain control. She has been given a prescription of meloxicam sent her pharmacy. She also complains of lower back pain following this fall and has been recommended to followup with Dr. Gonzalez for evaluation. She will followup as needed for her symptoms, otherwise annually status post left total hip replacement.

## 2021-01-13 ENCOUNTER — OUTPATIENT (OUTPATIENT)
Dept: OUTPATIENT SERVICES | Facility: HOSPITAL | Age: 76
LOS: 1 days | End: 2021-01-13
Payer: MEDICARE

## 2021-01-13 ENCOUNTER — APPOINTMENT (OUTPATIENT)
Dept: ULTRASOUND IMAGING | Facility: HOSPITAL | Age: 76
End: 2021-01-13

## 2021-01-13 ENCOUNTER — APPOINTMENT (OUTPATIENT)
Dept: MAMMOGRAPHY | Facility: IMAGING CENTER | Age: 76
End: 2021-01-13
Payer: MEDICARE

## 2021-01-13 DIAGNOSIS — Z00.8 ENCOUNTER FOR OTHER GENERAL EXAMINATION: ICD-10-CM

## 2021-01-13 DIAGNOSIS — Z90.49 ACQUIRED ABSENCE OF OTHER SPECIFIED PARTS OF DIGESTIVE TRACT: Chronic | ICD-10-CM

## 2021-01-13 DIAGNOSIS — Z96.641 PRESENCE OF RIGHT ARTIFICIAL HIP JOINT: Chronic | ICD-10-CM

## 2021-01-13 DIAGNOSIS — Z98.890 OTHER SPECIFIED POSTPROCEDURAL STATES: Chronic | ICD-10-CM

## 2021-01-13 PROCEDURE — 77066 DX MAMMO INCL CAD BI: CPT

## 2021-01-13 PROCEDURE — 77066 DX MAMMO INCL CAD BI: CPT | Mod: 26

## 2021-01-13 PROCEDURE — 76641 ULTRASOUND BREAST COMPLETE: CPT

## 2021-01-13 PROCEDURE — G0279: CPT | Mod: 26

## 2021-01-13 PROCEDURE — 76641 ULTRASOUND BREAST COMPLETE: CPT | Mod: 26,50

## 2021-01-13 PROCEDURE — G0279: CPT

## 2021-08-17 ENCOUNTER — APPOINTMENT (OUTPATIENT)
Dept: OBGYN | Facility: CLINIC | Age: 76
End: 2021-08-17
Payer: MEDICARE

## 2021-08-17 VITALS — HEIGHT: 62 IN | BODY MASS INDEX: 28.89 KG/M2 | WEIGHT: 157 LBS

## 2021-08-17 DIAGNOSIS — Z01.419 ENCOUNTER FOR GYNECOLOGICAL EXAMINATION (GENERAL) (ROUTINE) W/OUT ABNORMAL FINDINGS: ICD-10-CM

## 2021-08-17 PROCEDURE — G0101: CPT

## 2021-08-22 LAB — CYTOLOGY CVX/VAG DOC THIN PREP: ABNORMAL

## 2021-09-05 ENCOUNTER — TRANSCRIPTION ENCOUNTER (OUTPATIENT)
Age: 76
End: 2021-09-05

## 2021-09-07 ENCOUNTER — APPOINTMENT (OUTPATIENT)
Age: 76
End: 2021-09-07

## 2021-09-08 ENCOUNTER — TRANSCRIPTION ENCOUNTER (OUTPATIENT)
Age: 76
End: 2021-09-08

## 2021-09-09 ENCOUNTER — APPOINTMENT (OUTPATIENT)
Dept: DISASTER EMERGENCY | Facility: HOSPITAL | Age: 76
End: 2021-09-09

## 2021-09-09 ENCOUNTER — OUTPATIENT (OUTPATIENT)
Dept: OUTPATIENT SERVICES | Facility: HOSPITAL | Age: 76
LOS: 1 days | End: 2021-09-09
Payer: MEDICARE

## 2021-09-09 VITALS
SYSTOLIC BLOOD PRESSURE: 116 MMHG | OXYGEN SATURATION: 94 % | HEART RATE: 99 BPM | RESPIRATION RATE: 18 BRPM | DIASTOLIC BLOOD PRESSURE: 82 MMHG | TEMPERATURE: 99 F

## 2021-09-09 VITALS
OXYGEN SATURATION: 99 % | RESPIRATION RATE: 18 BRPM | DIASTOLIC BLOOD PRESSURE: 63 MMHG | HEART RATE: 82 BPM | TEMPERATURE: 98 F | SYSTOLIC BLOOD PRESSURE: 120 MMHG

## 2021-09-09 DIAGNOSIS — Z98.890 OTHER SPECIFIED POSTPROCEDURAL STATES: Chronic | ICD-10-CM

## 2021-09-09 DIAGNOSIS — Z90.49 ACQUIRED ABSENCE OF OTHER SPECIFIED PARTS OF DIGESTIVE TRACT: Chronic | ICD-10-CM

## 2021-09-09 DIAGNOSIS — Z20.822 CONTACT WITH AND (SUSPECTED) EXPOSURE TO COVID-19: ICD-10-CM

## 2021-09-09 DIAGNOSIS — Z96.641 PRESENCE OF RIGHT ARTIFICIAL HIP JOINT: Chronic | ICD-10-CM

## 2021-09-09 PROCEDURE — M0243: CPT

## 2021-09-09 RX ORDER — SODIUM CHLORIDE 9 MG/ML
250 INJECTION INTRAMUSCULAR; INTRAVENOUS; SUBCUTANEOUS
Refills: 0 | Status: COMPLETED | OUTPATIENT
Start: 2021-09-09 | End: 2021-09-09

## 2021-09-09 RX ADMIN — SODIUM CHLORIDE 310 MILLILITER(S): 9 INJECTION INTRAMUSCULAR; INTRAVENOUS; SUBCUTANEOUS at 09:55

## 2021-09-09 NOTE — CHART NOTE - NSCHARTNOTEFT_GEN_A_CORE
CC: Monoclonal Antibody Infusion for HIGH RISK EXPOSURE-PROPHYLAXIS (  is COVID +)      History: Patient presents for infusion of monoclonal antibody infusion. Patient has been screened and was deemed to be a candidate.    Symptoms/ Criteria: None    Risk Profile includes: Living with  COVID +, Age >65, Lung problems r/t Bronchiectasis, BMI >25, pt is not vaccinated     casirivimab/imdevimab in sodium chloride 0.9% (EUA) IVPB 100 milliLiter(s) IV Intermittent once  sodium chloride 0.9%. 250 milliLiter(s) IV Continuous <Continuous>      PMHx:  Suspected 2019-nCoV infection    FH: leukemia    FH: bladder cancer    FH: prostate cancer    Hypothyroid    GERD (gastroesophageal reflux disease)    Hiatal hernia    Cataract    History of bunionectomy of both great toes    BHAKTI (obstructive sleep apnea)    Low back pain    Cervical spinal stenosis    H/O bronchitis    Seasonal allergies    Lumbar spinal stenosis    Anemia    Diverticulosis    Status post appendectomy    History of total hip replacement, right    S/P LASIK surgery    History of bunionectomy of both great toes    CONTACT WITH AND (SUSPECTED) E          T(F): 98.8  HR: 88  BP: 116/82  RR: 20  SpO2: 94%      PE:   Appearance: NAD	  HEENT:   Normal oral mucosa.   Lymphatic: No lymphadenopathy  Cardiovascular: Normal S1 S2, No JVD, No murmurs, No edema  Respiratory: Lungs clear to auscultation	  Gastrointestinal:  Soft, Non-tender. No guarding   Skin: warm and dry  Neurologic: Non-focal  Extremities: Normal range of motion.     ASSESSMENT:  Pt is a 76y year old Female with High Risk Exposure referred to the infusion center for Monoclonal antibody infusion (Regeneron) for PROPHYLAXIS (  is COVID +).  Pt is not vaccinated.      PLAN:  - infusion procedure explained to patient   - Consent for monoclonal antibody infusion obtained   - Risk & benefits discussed/all questions answered  - infuse Regeneron over 21 minutes  - will observe patient for one hour post infusion  and then if stable discharge home with outpt follow up as planned by PMD.    POST INFUSION ASSESSMENT:   DISCHARGE at approximately  1  hour post infusion    - Patient tolerated infusion well denies complaints of chest pain/SOB/dizziness/ palps  - VSS for discharge home  - D/C instructions given/ fact sheet included.  - Patient to follow-up with PCP as needed.

## 2021-11-09 ENCOUNTER — APPOINTMENT (OUTPATIENT)
Dept: ORTHOPEDIC SURGERY | Facility: CLINIC | Age: 76
End: 2021-11-09
Payer: MEDICARE

## 2021-11-09 VITALS — WEIGHT: 158 LBS | HEIGHT: 62 IN | BODY MASS INDEX: 29.08 KG/M2

## 2021-11-09 PROCEDURE — 73562 X-RAY EXAM OF KNEE 3: CPT | Mod: RT

## 2021-11-09 PROCEDURE — 99214 OFFICE O/P EST MOD 30 MIN: CPT | Mod: 25

## 2021-11-09 PROCEDURE — 20610 DRAIN/INJ JOINT/BURSA W/O US: CPT | Mod: RT

## 2021-11-15 ENCOUNTER — APPOINTMENT (OUTPATIENT)
Dept: ORTHOPEDIC SURGERY | Facility: CLINIC | Age: 76
End: 2021-11-15
Payer: MEDICARE

## 2021-11-15 VITALS
SYSTOLIC BLOOD PRESSURE: 124 MMHG | WEIGHT: 158 LBS | BODY MASS INDEX: 29.08 KG/M2 | HEIGHT: 62 IN | HEART RATE: 97 BPM | DIASTOLIC BLOOD PRESSURE: 73 MMHG

## 2021-11-15 DIAGNOSIS — M47.812 SPONDYLOSIS W/OUT MYELOPATHY OR RADICULOPATHY, CERVICAL REGION: ICD-10-CM

## 2021-11-15 DIAGNOSIS — M43.16 SPONDYLOLISTHESIS, LUMBAR REGION: ICD-10-CM

## 2021-11-15 DIAGNOSIS — K21.00 GASTRO-ESOPHAGEAL REFLUX DISEASE WITH ESOPHAGITIS, WITHOUT BLEEDING: ICD-10-CM

## 2021-11-15 DIAGNOSIS — M54.16 RADICULOPATHY, LUMBAR REGION: ICD-10-CM

## 2021-11-15 DIAGNOSIS — M47.816 SPONDYLOSIS W/OUT MYELOPATHY OR RADICULOPATHY, LUMBAR REGION: ICD-10-CM

## 2021-11-15 DIAGNOSIS — M48.061 SPINAL STENOSIS, LUMBAR REGION WITHOUT NEUROGENIC CLAUDICATION: ICD-10-CM

## 2021-11-15 PROCEDURE — 99215 OFFICE O/P EST HI 40 MIN: CPT

## 2021-11-15 NOTE — DISCUSSION/SUMMARY
[de-identified] : She has reflux symptoms that are incompletely controlled with her Nexium 40 mg a day.  I have added Carafate 1 g 4 times a day to that and she has been started on meloxicam 15 mg once a day.  She tells me if she takes an Aleve that she very quickly falls asleep.  I will see her for follow-up in 3-1/2 weeks.  We discussed that if the meloxicam is helping we will continue it.  If it is of no benefit we will consider switching her to a traditional nonsteroidal anti-inflammatory.  We also discussed the possibility of a course of oral steroids.  She asks about epidural steroid injections as she has been told they will completely relieve her symptoms.  I discussed with her that there are not predictably effective for the back pain due to the multilevel degenerative changes and if anything would help the radicular pain.

## 2021-11-15 NOTE — REASON FOR VISIT
[Initial Visit] : an initial visit for [Degenerative Joint Disease] : degenerative joint disease [Back Pain] : back pain [Radiculopathy] : radiculopathy [Spinal Stenosis] : spinal stenosis [Spondylolistheses] : spondylolistheses

## 2021-11-15 NOTE — PHYSICAL EXAM
[de-identified] : She is fully alert and oriented with a normal mood and affect.  She is in no acute distress as I take the history.  She ambulates with a normal gait including tiptoe and heel walking.  There are no cutaneous abnormalities or palpable bony defects of the spine.  There is no evidence of shortness of breath or respiratory distress.  There is no paravertebral muscle spasm or sciatic notch tenderness but there is mild bilateral trochanteric tenderness.  Forward flexion of the spine at 70 degrees causes lower back discomfort.  Her lower extremity neurological examination revealed 1+ symmetrical reflexes.  Motor power is normal to manual testing in all lower extremity groups and sensation is normal to light touch in all dermatomes.  Straight leg raising is negative to 90 degrees in the sitting position.  She is wearing a sleeve on her left knee with the patella cut out there is restriction of range of motion of the right knee. [de-identified] : I reviewed an outside MRI of the cervical spine that reveals multilevel degenerative changes with a reversal of the normal cervical lordosis with some prominent disc bulges and mild canal stenosis.  There are no signal changes in the cord.\par \par I reviewed an MRI of the lumbar spine from last November as well that reveals a mild to moderate scoliosis with multilevel degenerative changes.  There is a degenerative spondylolisthesis at L4-5 with severe stenosis at that level.  There is significant epidural lipomatosis.  There are multiple disc bulges.\par \par I also reviewed prior x-rays of the lumbar spine that look as one would anticipate after reviewing the MRI of the lumbar spine.

## 2021-11-15 NOTE — HISTORY OF PRESENT ILLNESS
[de-identified] : This 76-year-old woman has a 20+ year history of intermittent but mostly on lower back pain that she grades as an 8 associated with bilateral leg pain usually a 6 on the right and a 6 on the left.  She has symptoms of intermittent numbness in both legs but denies paresthesias.  There is no Valsalva effect.  She does have night pain.  The pain is worse standing after only 3 or 4 minutes and to a lesser extent worse sitting.  She is comfortable she walks very slowly.  Treatment has been ibuprofen over-the-counter 20 mg on rare occasions.  She also uses a lidocaine patch on her back.\par \par She has had prior bilateral total hip replacements, and appendectomy and bunion surgery.  She is on Nexium 40 mg for reflux with incomplete control of her symptoms. [Pain Location] : pain [Worsening] : worsening [8] : a maximum pain level of 8/10 [Prolonged Sitting] : worsened by prolonged sitting [Prolonged Standing] : worsened by prolonged standing [Sitting] : worsened by sitting [Standing] : worsened by standing

## 2021-11-23 ENCOUNTER — APPOINTMENT (OUTPATIENT)
Dept: ORTHOPEDIC SURGERY | Facility: CLINIC | Age: 76
End: 2021-11-23
Payer: MEDICARE

## 2021-11-23 DIAGNOSIS — D17.79 BENIGN LIPOMATOUS NEOPLASM OF OTHER SITES: ICD-10-CM

## 2021-11-23 DIAGNOSIS — M47.816 SPONDYLOSIS W/OUT MYELOPATHY OR RADICULOPATHY, LUMBAR REGION: ICD-10-CM

## 2021-11-23 DIAGNOSIS — M51.36 OTHER INTERVERTEBRAL DISC DEGENERATION, LUMBAR REGION: ICD-10-CM

## 2021-11-23 DIAGNOSIS — M48.07 SPINAL STENOSIS, LUMBOSACRAL REGION: ICD-10-CM

## 2021-11-23 PROCEDURE — 72100 X-RAY EXAM L-S SPINE 2/3 VWS: CPT

## 2021-11-23 PROCEDURE — 99214 OFFICE O/P EST MOD 30 MIN: CPT

## 2021-11-23 PROCEDURE — 72040 X-RAY EXAM NECK SPINE 2-3 VW: CPT

## 2021-12-02 ENCOUNTER — APPOINTMENT (OUTPATIENT)
Dept: ORTHOPEDIC SURGERY | Facility: CLINIC | Age: 76
End: 2021-12-02
Payer: MEDICARE

## 2021-12-02 VITALS
HEIGHT: 62 IN | SYSTOLIC BLOOD PRESSURE: 116 MMHG | HEART RATE: 102 BPM | WEIGHT: 158 LBS | BODY MASS INDEX: 29.08 KG/M2 | DIASTOLIC BLOOD PRESSURE: 70 MMHG

## 2021-12-02 DIAGNOSIS — M70.72 OTHER BURSITIS OF HIP, LEFT HIP: ICD-10-CM

## 2021-12-02 PROCEDURE — 99214 OFFICE O/P EST MOD 30 MIN: CPT

## 2021-12-02 PROCEDURE — 73502 X-RAY EXAM HIP UNI 2-3 VIEWS: CPT | Mod: LT

## 2021-12-03 NOTE — H&P PST ADULT - FALL HARM RISK CONCLUSION
Fall Risk Pt w/ PMH of DM, HTN and HLD presents c/o x2 weeks of RUQ pain w/ nausea.  Pt denies changes to bowel or bladder habits.  Pt denies CP, SOB or fevers/chills.  Pt endorses she took her BP meds PTA.  Denies HA or neuro deficit at this time.

## 2021-12-08 ENCOUNTER — APPOINTMENT (OUTPATIENT)
Dept: ORTHOPEDIC SURGERY | Facility: CLINIC | Age: 76
End: 2021-12-08

## 2022-01-10 ENCOUNTER — APPOINTMENT (OUTPATIENT)
Dept: SURGERY | Facility: CLINIC | Age: 77
End: 2022-01-10

## 2022-02-03 ENCOUNTER — APPOINTMENT (OUTPATIENT)
Dept: MAMMOGRAPHY | Facility: IMAGING CENTER | Age: 77
End: 2022-02-03
Payer: MEDICARE

## 2022-02-03 ENCOUNTER — OUTPATIENT (OUTPATIENT)
Dept: OUTPATIENT SERVICES | Facility: HOSPITAL | Age: 77
LOS: 1 days | End: 2022-02-03
Payer: MEDICARE

## 2022-02-03 ENCOUNTER — APPOINTMENT (OUTPATIENT)
Dept: ULTRASOUND IMAGING | Facility: IMAGING CENTER | Age: 77
End: 2022-02-03
Payer: MEDICARE

## 2022-02-03 DIAGNOSIS — Z00.8 ENCOUNTER FOR OTHER GENERAL EXAMINATION: ICD-10-CM

## 2022-02-03 DIAGNOSIS — Z90.49 ACQUIRED ABSENCE OF OTHER SPECIFIED PARTS OF DIGESTIVE TRACT: Chronic | ICD-10-CM

## 2022-02-03 DIAGNOSIS — Z96.641 PRESENCE OF RIGHT ARTIFICIAL HIP JOINT: Chronic | ICD-10-CM

## 2022-02-03 DIAGNOSIS — Z98.890 OTHER SPECIFIED POSTPROCEDURAL STATES: Chronic | ICD-10-CM

## 2022-02-03 PROCEDURE — 77067 SCR MAMMO BI INCL CAD: CPT | Mod: 26

## 2022-02-03 PROCEDURE — 77067 SCR MAMMO BI INCL CAD: CPT

## 2022-02-03 PROCEDURE — 76641 ULTRASOUND BREAST COMPLETE: CPT

## 2022-02-03 PROCEDURE — 77063 BREAST TOMOSYNTHESIS BI: CPT

## 2022-02-03 PROCEDURE — 76641 ULTRASOUND BREAST COMPLETE: CPT | Mod: 26,50

## 2022-02-03 PROCEDURE — 77063 BREAST TOMOSYNTHESIS BI: CPT | Mod: 26

## 2022-02-28 ENCOUNTER — APPOINTMENT (OUTPATIENT)
Dept: SURGERY | Facility: CLINIC | Age: 77
End: 2022-02-28
Payer: MEDICARE

## 2022-02-28 VITALS
WEIGHT: 152 LBS | BODY MASS INDEX: 27.97 KG/M2 | DIASTOLIC BLOOD PRESSURE: 81 MMHG | HEART RATE: 99 BPM | TEMPERATURE: 97.5 F | HEIGHT: 62 IN | OXYGEN SATURATION: 95 % | SYSTOLIC BLOOD PRESSURE: 132 MMHG | RESPIRATION RATE: 17 BRPM

## 2022-02-28 PROCEDURE — 99205 OFFICE O/P NEW HI 60 MIN: CPT

## 2022-02-28 RX ORDER — MELOXICAM 15 MG/1
15 TABLET ORAL
Qty: 30 | Refills: 0 | Status: DISCONTINUED | COMMUNITY
Start: 2020-10-22 | End: 2022-02-28

## 2022-02-28 RX ORDER — MUPIROCIN 20 MG/G
2 OINTMENT TOPICAL
Qty: 1 | Refills: 0 | Status: DISCONTINUED | COMMUNITY
Start: 2020-03-02 | End: 2022-02-28

## 2022-02-28 RX ORDER — CELECOXIB 100 MG/1
100 CAPSULE ORAL TWICE DAILY
Qty: 60 | Refills: 1 | Status: DISCONTINUED | COMMUNITY
Start: 2020-06-10 | End: 2022-02-28

## 2022-02-28 RX ORDER — MUPIROCIN 20 MG/G
2 OINTMENT TOPICAL
Qty: 1 | Refills: 0 | Status: DISCONTINUED | COMMUNITY
Start: 2020-06-02 | End: 2022-02-28

## 2022-02-28 RX ORDER — CELECOXIB 200 MG/1
200 CAPSULE ORAL TWICE DAILY
Qty: 60 | Refills: 0 | Status: DISCONTINUED | COMMUNITY
Start: 2020-06-03 | End: 2022-02-28

## 2022-02-28 RX ORDER — SUCRALFATE 1 G/1
1 TABLET ORAL 4 TIMES DAILY
Qty: 120 | Refills: 0 | Status: DISCONTINUED | COMMUNITY
Start: 2021-11-15 | End: 2022-02-28

## 2022-02-28 RX ORDER — SENNOSIDES 8.6 MG TABLETS 8.6 MG/1
8.6 TABLET ORAL
Qty: 60 | Refills: 0 | Status: DISCONTINUED | COMMUNITY
Start: 2020-06-03 | End: 2022-02-28

## 2022-02-28 RX ORDER — OMEPRAZOLE 20 MG/1
20 CAPSULE, DELAYED RELEASE ORAL DAILY
Qty: 42 | Refills: 0 | Status: DISCONTINUED | COMMUNITY
Start: 2020-06-03 | End: 2022-02-28

## 2022-02-28 RX ORDER — TRAMADOL HYDROCHLORIDE 50 MG/1
50 TABLET, COATED ORAL 3 TIMES DAILY
Qty: 21 | Refills: 0 | Status: DISCONTINUED | COMMUNITY
Start: 2020-06-03 | End: 2022-02-28

## 2022-02-28 RX ORDER — ASPIRIN 325 MG/1
325 TABLET ORAL
Qty: 84 | Refills: 0 | Status: DISCONTINUED | COMMUNITY
Start: 2020-06-03 | End: 2022-02-28

## 2022-02-28 RX ORDER — GABAPENTIN 100 MG/1
100 CAPSULE ORAL 3 TIMES DAILY
Qty: 90 | Refills: 0 | Status: DISCONTINUED | COMMUNITY
Start: 2020-06-03 | End: 2022-02-28

## 2022-02-28 RX ORDER — AMOXICILLIN 500 MG/1
500 TABLET, FILM COATED ORAL
Qty: 12 | Refills: 0 | Status: DISCONTINUED | COMMUNITY
Start: 2020-08-06 | End: 2022-02-28

## 2022-02-28 RX ORDER — CHLORHEXIDINE GLUCONATE 4 G/100ML
4 SOLUTION TOPICAL
Qty: 2 | Refills: 0 | Status: DISCONTINUED | COMMUNITY
Start: 2020-06-05 | End: 2022-02-28

## 2022-02-28 RX ORDER — DOCUSATE SODIUM 100 MG/1
100 CAPSULE ORAL 3 TIMES DAILY
Qty: 90 | Refills: 0 | Status: DISCONTINUED | COMMUNITY
Start: 2020-06-03 | End: 2022-02-28

## 2022-02-28 RX ORDER — MELOXICAM 15 MG/1
15 TABLET ORAL
Qty: 30 | Refills: 0 | Status: DISCONTINUED | COMMUNITY
Start: 2021-11-15 | End: 2022-02-28

## 2022-02-28 RX ORDER — ACETAMINOPHEN EXTRA STRENGTH 500 MG/1
500 TABLET ORAL 3 TIMES DAILY
Qty: 84 | Refills: 0 | Status: DISCONTINUED | COMMUNITY
Start: 2020-06-03 | End: 2022-02-28

## 2022-02-28 RX ORDER — FAMOTIDINE 40 MG/1
40 TABLET, FILM COATED ORAL
Qty: 30 | Refills: 0 | Status: DISCONTINUED | COMMUNITY
Start: 2020-06-10 | End: 2022-02-28

## 2022-02-28 RX ORDER — OXYCODONE 5 MG/1
5 TABLET ORAL
Qty: 56 | Refills: 0 | Status: DISCONTINUED | COMMUNITY
Start: 2020-06-03 | End: 2022-02-28

## 2022-02-28 NOTE — ASSESSMENT
[FreeTextEntry1] : 76-year-old female here for evaluation for a large hiatal hernia most recently documented last year on CAT scan her symptoms have abated somewhat since she has lost some weight but still is symptomatic especially after eating and at night.  We have had a long discussion regarding the pathophysiology anatomy of a hiatal hernia as well as recommendations of repair with the risks and benefits.  We will repeat her CAT scan as well as repeat her endoscopy and she will return after these are completed The risk and benefits of the procedure were discussed with patient. The risk including, but not limited to, risk of bleeding, infection, recurrence, injury to other organs (nerves, esophagus, stomach, liver, etc), bloating, wrap being too tight and requiring reoperation or other interventions, delayed gastric emptying and persistent reflux. Several general anesthesia risks like MI, Stroke, respiratory complications and death were discussed. Need for modified diet postoperatively was discussed. All questions were answered and education materials were provided.\par \par \par \par

## 2022-02-28 NOTE — HISTORY OF PRESENT ILLNESS
[de-identified] : Trina is a 77 y/o female here for consultation of hiatal hernia.  The patient has had a longstanding history of reflux dating back 10 to 20 years.  A CAT scan obtained and listed below in August 2021 revealed a large hiatal hernia endoscopy according to her was approximately 2 years ago.  She is here for evaluation for repair of this hiatal hernia she has had fairly significant symptoms of dysphagia abdominal bloating regurgitation nocturnal regurgitation.  This seems to have improved since she lost a fair amount of weight recently.\par CT A+P 8/8/21- Re-demonstration of a large hiatal hernia. approximately half of the stomach is intrathoracic, unchanged. Small bowel loops are normal in caliber. There is  no mesenteric lymphadenopathy. There is left-sided colonic diverticulosis. No evidence of diverticulitis or colitis. S/p appendectomy.

## 2022-02-28 NOTE — PHYSICAL EXAM
[JVD] : no jugular venous distention  [Normal Breath Sounds] : Normal breath sounds [Normal Heart Sounds] : normal heart sounds [Abdominal Masses] : No abdominal masses [Abdomen Tenderness] : ~T ~M No abdominal tenderness [No HSM] : no hepatosplenomegaly [Tender] : was nontender [No Rash or Lesion] : No rash or lesion [Calm] : calm [de-identified] : Ambulating without difficulty [de-identified] : Within normal limits [de-identified] : Soft nontender nondistended no hernias or masses are present [de-identified] : Full range of motion [de-identified] : Cranial nerves II through XII grossly intact

## 2022-04-05 ENCOUNTER — APPOINTMENT (OUTPATIENT)
Dept: ORTHOPEDIC SURGERY | Facility: CLINIC | Age: 77
End: 2022-04-05
Payer: MEDICARE

## 2022-04-05 VITALS
HEART RATE: 98 BPM | SYSTOLIC BLOOD PRESSURE: 133 MMHG | DIASTOLIC BLOOD PRESSURE: 80 MMHG | BODY MASS INDEX: 27.97 KG/M2 | HEIGHT: 62 IN | WEIGHT: 152 LBS

## 2022-04-05 DIAGNOSIS — S79.912A UNSPECIFIED INJURY OF LEFT HIP, INITIAL ENCOUNTER: ICD-10-CM

## 2022-04-05 PROCEDURE — 73502 X-RAY EXAM HIP UNI 2-3 VIEWS: CPT

## 2022-04-05 PROCEDURE — 99214 OFFICE O/P EST MOD 30 MIN: CPT

## 2022-04-21 ENCOUNTER — NON-APPOINTMENT (OUTPATIENT)
Age: 77
End: 2022-04-21

## 2022-04-27 ENCOUNTER — APPOINTMENT (OUTPATIENT)
Dept: ORTHOPEDIC SURGERY | Facility: CLINIC | Age: 77
End: 2022-04-27
Payer: MEDICARE

## 2022-04-27 VITALS — BODY MASS INDEX: 27.6 KG/M2 | WEIGHT: 150 LBS | HEIGHT: 62 IN

## 2022-04-27 DIAGNOSIS — Z00.00 ENCOUNTER FOR GENERAL ADULT MEDICAL EXAMINATION W/OUT ABNORMAL FINDINGS: ICD-10-CM

## 2022-04-27 PROCEDURE — 73564 X-RAY EXAM KNEE 4 OR MORE: CPT | Mod: LT

## 2022-04-27 PROCEDURE — 20610 DRAIN/INJ JOINT/BURSA W/O US: CPT

## 2022-04-27 PROCEDURE — 99214 OFFICE O/P EST MOD 30 MIN: CPT | Mod: 25

## 2022-04-28 NOTE — HISTORY OF PRESENT ILLNESS
[Sudden] : sudden [Dull/Aching] : dull/aching [Localized] : localized [Sharp] : sharp [Intermittent] : intermittent [Sitting] : sitting [Standing] : standing [Walking] : walking [Stairs] : stairs [] : no [FreeTextEntry1] : left knee [FreeTextEntry5] : pt reports she fell on her knee 3 weeks ago. she was hoping pain would subside but its been persistent. [FreeTextEntry7] : knee to ankle

## 2022-04-28 NOTE — PHYSICAL EXAM
[de-identified] : The patient is a well appearing 76 year old female of their stated age.\par Patient ambulates with a ANTALGIC gait.\par Negative straight leg raise bilateral\par \par Right Knee:                         	\par ROM:  0-145 degrees\par \par Lachman: Negative\par Pivot Shift: Negative\par Anterior Drawer: Negative\par Posterior Drawer / Sag: Negative\par Varus Stress 0 degrees: Stable\par Varus Stress 30 degrees: Stable\par Valgus Stress 0 degrees: Stable\par Valgus Stress 30 degrees: Stable\par Medial Kishore: Positive\par Lateral Kishore: Negative\par Patella Glide: 2+\par Patella Apprehension: Negative\par Patella Grind: Positive\par \par Palpation:\par Medial Joint Line: TTP\par Lateral Joint Line: TTP\par Medial Collateral Ligament: Nontender\par Lateral Collateral Ligament/PLC: Nontender\par Distal Femur: Nontender\par Proximal Tibia: Nontender\par Tibial Tubercle: Nontender\par Distal Pole Patella: Nontender\par Quadriceps Tendon: Nontender &  Intact\par Patella Tendon: Nontender &  Intact\par Medial Distal Hamstring/PES: Nontender\par Lateral Distal Hamstring: Nontender & Stable\par Iliotibial Band: Nontender\par Medial Patellofemoral Ligament: Nontender\par Adductor: Nontender\par Proximal GSC-Plantaris: Nontender\par Calf: Supple & Nontender\par \par Inspection:\par Deformity: No\par Erythema: No\par Ecchymosis: No\par Abrasions: No\par Effusion: Moderate\par Prepatellar Bursitis: No\par \par Neurologic Exam:\par Sensation L4-S1: Grossly Intact\par \par Motor Exam:\par Quadriceps: 5 out of 5\par Hamstrings: 5 out of 5\par EHL: 5 out of 5\par FHL: 5 out of 5\par TA: 5 out of 5\par GS: 5 out of 5\par \par Circulatory/Pulses:\par Dorsalis Pedis: 2+\par Posterior Tibialis: 2+\par \par Additional Pertinent Findings: None\par \par Left Knee:                           	\par ROM:  0-145 degrees\par \par Lachman: Negative\par Pivot Shift: Negative\par Anterior Drawer: Negative\par Posterior Drawer / Sag: Negative\par Varus Stress 0 degrees: Stable\par Varus Stress 30 degrees: Stable\par Valgus Stress 0 degrees: Stable\par Valgus Stress 30 degrees: Stable\par Medial Kishore: Positive\par Lateral Kishore: Negative\par Patella Glide: 2+\par Patella Apprehension: Negative\par Patella Grind: Positive\par \par Palpation:\par Medial Joint Line: TTP\par Lateral Joint Line: TTP\par Medial Collateral Ligament: Nontender\par Lateral Collateral Ligament/PLC: Nontender\par Distal Femur: Nontender\par Proximal Tibia: Nontender\par Tibial Tubercle: Nontender\par Distal Pole Patella: Nontender\par Quadriceps Tendon: Nontender &  Intact\par Patella Tendon: Nontender &  Intact\par Medial Distal Hamstring/PES: Nontender\par Lateral Distal Hamstring: Nontender & Stable\par Iliotibial Band: Nontender\par Medial Patellofemoral Ligament: Nontender\par Adductor: Nontender\par Proximal GSC-Plantaris: Nontender\par Calf: Supple & Nontender\par \par Inspection:\par Deformity: No\par Erythema: No\par Ecchymosis: No\par Abrasions: No\par Effusion: Moderate\par Prepatellar Bursitis: No\par \par Neurologic Exam:\par Sensation L4-S1: Grossly Intact\par \par Motor Exam:\par Quadriceps: 5 out of 5\par Hamstrings: 5 out of 5\par EHL: 5 out of 5\par FHL: 5 out of 5\par TA: 5 out of 5\par GS: 5 out of 5\par \par Circulatory/Pulses:\par Dorsalis Pedis: 2+\par Posterior Tibialis: 2+ [Bilateral] : knee bilaterally [There are no fractures, subluxations or dislocations. No significant abnormalities are seen] : There are no fractures, subluxations or dislocations. No significant abnormalities are seen [advanced tricompartmental OA with medial compartment narrowing and varus alignment] : advanced tricompartmental OA with medial compartment narrowing and varus alignment

## 2022-04-28 NOTE — DISCUSSION/SUMMARY
[de-identified] : The natural progression of osteoarthritis was explained to the patient.  We discussed the possible treatment options from conservative to operative.  These included NSAIDS, Glucosamine and Chondrotin sulfate, and Physical Therapy as well different types of injections.  We also discussed that at some point they may progress to needed a TKA.  Information and pamphlets were given.\par \par We discussed their diagnosis and treatment options at length including surgical and non-surgical options. We will first attempt conservative treatment with activity modification, PT, icing, weight loss, and anti-inflammatory medications. We discussed the possible of injections (steroid and viscosupplementation) in the future. The patient was provided with a PT prescription to work on ROM, hip ER/abductors strengthening, quad/hamstring stretches and strengthening, and other exercises on the Knee Arthritis Protocol.\par \par Dx / Natural History:\par The patient was advised of the diagnosis.  The natural history of the pathology was explained in full to the patient in layman's terms.  Several different treatment options were discussed and explained in full to the patient including the risks and benefits of both surgical and non-surgical treatments.  All questions and concerns were answered. \par \par Pain Guide Activities:\par The patient was advised to let pain guide the gradual advancement of activities.\par \par Physical Therapy:\par The patient was provided with a prescription for Physical Therapy.\par \par Icing:\par The patient was advised to apply ice (wrapped in a towel or protective covering) to the area daily (20 minutes at a time, 2-4X/day).\par \par The risks, benefits, and alternatives to corticosteroid injection were reviewed with the patient. Risks outlined include but are not limited to infection, sepsis, bleeding, scarring, skin discoloration, temporary increase in pain, syncopal episode, failure to resolve symptoms, symptoms recurrence, allergic reaction, flare reaction, and elevation of blood sugar in diabetics. Patient understood the risks and asked to proceed with this treatment course. \par \par Follow up in 4-6 weeks to re-evaluate.

## 2022-04-28 NOTE — PROCEDURE
[FreeTextEntry1] : Left Knee CSI [FreeTextEntry2] : Left Knee OA [FreeTextEntry3] : Patient Identification \par Name/: Verbal with patient and/or family \par  \par Procedure Verification: \par Procedure confirmed with patient or family/designee \par Consent for procedure: Verbal Consent Given \par Relevant documentation completed, reviewed, and signed \par Clinical indications for procedure confirmed \par  \par Time-out with all members of procedure team immediately prior to procedure: \par Correct patient identified. Agreement on procedure. Correct side and site. \par  \par KNEE INJECTION (STEROID) - LEFT \par After verbal consent and identification of the correct patient and correct site, the superolateral?left knee was prepped using alcohol swabs and betadine. This was allowed time to air dry. A mixture of 1cc Celestone 6mg/ml, 2cc Lidocaine 1%, and 2cc Bupivacaine 0.5% was injected into the suprapatellar pouch using a sterile 22G needle after ethyl chloride spray for skin anesthesia. The patient tolerated the procedure well. After-care instructions were provided and included instructions to ice the area and to call if redness, pain, or fever develop.

## 2022-05-04 ENCOUNTER — APPOINTMENT (OUTPATIENT)
Dept: CT IMAGING | Facility: CLINIC | Age: 77
End: 2022-05-04

## 2022-05-12 NOTE — H&P PST ADULT - NS PRO AD PATIENT TYPE
GENERAL SURGERY POST OPERATIVE NOTE    OPERATION:    Laparoscopic converted to open abdominal exploration, lysis of adhesions and appendectomy, 5/2/22 by Dr. Manning     POST OP DAY: 10      SUBJECTIVE:   Getting ready to leave for dialysis. Reports she feels great today. Tolerating transition diet without nausea or vomiting. Denies abdominal pain and states back pain is much improved with percocet. States she is still having bowel movements.       OBJECTIVE:   Vital Last Value 24 Hour Range   Temperature 98.1 °F (36.7 °C) (05/12/22 1130) Temp  Min: 97.8 °F (36.6 °C)  Max: 98.2 °F (36.8 °C)   Pulse 95 (05/12/22 1430) Pulse  Min: 74  Max: 102   Respiratory 15 (05/12/22 1130) Resp  Min: 15  Max: 18   Non-Invasive  Blood Pressure (!) 140/79 (05/12/22 1430) BP  Min: 120/68  Max: 158/74   Pulse Oximetry 92 % (05/12/22 1130) SpO2  Min: 92 %  Max: 98 %   Arterial   Blood Pressure   No data recorded     RECENT WEIGHTS:  Weight    05/10/22 0845 05/10/22 1206 05/11/22 0603 05/12/22 0519   Weight: 127.7 kg (281 lb 8.4 oz) 123.9 kg (273 lb 2.4 oz) 126.9 kg (279 lb 11.2 oz) 120.9 kg (266 lb 8.6 oz)       General: No acute distress.   Abdomen: Soft, nondistended, appropriately tender along the incision. SCOTT drain: 10cc/ 24 hours, serous drainage.  Incision(s):  Midline abdominal incision approximated with staples with few gaps to allow drainage.      Intake/Output:    Intake/Output Summary (Last 24 hours) at 5/12/2022 1442  Last data filed at 5/12/2022 0900  Gross per 24 hour   Intake 1340 ml   Output 10 ml   Net 1330 ml        Last Stool Occurrence:  1 (05/12/22 0019)    Laboratory Results:  Recent Labs   Lab 05/12/22  0516 05/11/22  0620 05/10/22  0428   WBC 7.6 7.3 6.8   RBC 3.09* 2.94* 2.92*   HCT 30.5* 28.8* 28.2*   HGB 8.6* 8.5* 8.2*   * 521* 488*     Recent Labs   Lab 05/12/22  0516 05/11/22  0620 05/10/22  0428 05/09/22  0353 05/08/22  0354 05/07/22  1022 05/07/22  0507 05/06/22  0420   SODIUM 133* 135 135 137 137   --    < > 136   POTASSIUM 4.2 3.9 4.2 4.1 4.1  --    < > 3.8   CHLORIDE 98 99 100 99 101  --    < > 100   CO2 25 28 26 27 27  --    < > 25   GLUCOSE 114* 101* 90 130* 114*  --    < > 109*   BUN 36* 29* 41* 31* 23*  --    < > 29*   CREATININE 10.20* 8.55* 10.40* 8.36* 6.42*  --    < > 7.60*   CALCIUM 8.7 9.1 9.1 8.6 9.3  --    < > 8.8   ALBERTO  --   --   --   --   --  1.24  --   --    ALBUMIN 2.7* 2.6* 2.5* 2.4* 2.4*  --    < > 2.1*   MG  --   --   --  2.2 2.0  --   --  2.1   BILIRUBIN 0.4 0.4 0.5 0.4 0.5  --    < > 0.7   ALKPT 72 71 72 67 73  --    < > 73   AST 12 14 15 12 13  --    < > 9   GPT 16 15 15 13 12  --    < > 10   BCRAT 4* 3* 4* 4* 4*  --    < > 4*    < > = values in this interval not displayed.         Surgical Care Improvement Project:  Sheila: No  DVT/VTE (Deep venous thrombosis/venous thromboembolism) prophylaxis:   VTE Pharmacologic Prophylaxis: Yes  VTE Mechanical Prophylaxis: Yes  Antibiotics D/C'd (discontinued) within 24 hours of surgery end: No,  Antibiotic ordered because patient has an infection or suspected infection  Central Line: No  Ulcer prophylaxis: Protonix      ASSESSMENT:   62 year old female with past medical history significant for CHF, CAD, ESRD on HD THS, CVA, brain cancer, breast cancer, HTN, HLD, DM, and GERD presenting with acute appendicitis. She underwent laparoscopic converted to open appendectomy on 5/2/22. She is POD #10.   - transfer from ICU to general medical floor 5/4  - right arm fistulogram 5/5   - Obtunded following procedure upon arrival to room, [facility]    - CT scan head 5/5 without acute findings.      PLAN:   -Advance to renal/carb consistent diet  -Ok to keep incision open to air, will remove staples between 2-3 weeks post-op  -SCOTT drain to remain, consider removal today or tomorrow  -OK to restart home Percocet from a general surgery standpoint  -IV antibiotics per attending, currently on meropenem and vanco  -PRN pain medications and  antiemetics  -Medical management per attending    No need for further CT imaging from a general surgery standpoint. Will plan to remove staples around the 3 week dilma. Follow-up appointment has been made.    Acute Care Service to sign off at this time, please contact again with any further questions or concerns.      Maris Osborne PA-C  [phone]  After 5 pm please page the on call surgeon for the Acute Care Service at [phone] with any emergent concerns     Health Care Proxy (HCP)

## 2022-05-17 ENCOUNTER — APPOINTMENT (OUTPATIENT)
Dept: ORTHOPEDIC SURGERY | Facility: CLINIC | Age: 77
End: 2022-05-17
Payer: MEDICARE

## 2022-05-17 DIAGNOSIS — M47.816 SPONDYLOSIS W/OUT MYELOPATHY OR RADICULOPATHY, LUMBAR REGION: ICD-10-CM

## 2022-05-17 DIAGNOSIS — Z96.642 PRESENCE OF LEFT ARTIFICIAL HIP JOINT: ICD-10-CM

## 2022-05-17 DIAGNOSIS — M25.552 PAIN IN LEFT HIP: ICD-10-CM

## 2022-05-17 PROCEDURE — 99215 OFFICE O/P EST HI 40 MIN: CPT

## 2022-05-17 NOTE — PHYSICAL EXAM
[de-identified] : Patient is well nourished, well-developed, in no acute distress, with appropriate mood and affect. The patient is oriented to time, place, and person. Respirations are even and unlabored. Gait evaluation does not reveal a limp. There is no inguinal adenopathy. Examination of the contralateral hip shows normal range of motion, strength, no tenderness, and intact skin. The affected limb is well-perfused and showed 2+ dp/pt pulses, without skin lesions, shows a grossly normal motor and sensory examination. Examination of the hip shows a well healed surgical scar. Hip motion is full and painless from 0-90 degrees extension to flexion, 20 degrees adduction and 20 degrees abduction, and 15 degrees internal and 30 degrees external rotation. Leg lengths are approximately equal. Both hips are stable and muscle strength is normal with good strength with resisted abduction and adduction. Pedal pulses are palpable.  Tender to palpation around the buttock.  Tender to palpation on the thigh and nonanatomic distributions. [de-identified] : AP pelvis, AP hip, and lateral x-rays of the left hip were brought in by the patient which I reviewed and demonstrate satisfactory position and alignment of the components are present. No signs of loosening are seen.\par \par The patient brings with her an MRI of the left hip.  This was ordered by another surgeon.  This demonstrates some mild fluid around the tip of the stem and lateral aspect of the implant.

## 2022-05-17 NOTE — HISTORY OF PRESENT ILLNESS
[de-identified] : This is very nice 76-year-old female experiencing left hip pain, which is severe in intensity.  History of a left total of arthroplasty by Dr. Pitts June 2020.  Initially did very well.  4 months ago*developing pain in the left hip and groin.  The sensation of pain is in the left low back and it radiates down the left leg to the foot.  This is slowly receded with a shooting and shocking type pain.  No numbness or Lucy.  No weakness.  No bowel or bladder incontinence.  The pain substantially limits activities of daily living. Walking tolerance is reduced.  Nothing makes the pain better.  History of a right total of arthroplasty by another surgeon.

## 2022-05-17 NOTE — REASON FOR VISIT
[Initial Visit] : an initial visit for [Hip Pain] : hip pain [Osteoarthritis, Hip] : osteoarthritis, hip

## 2022-05-17 NOTE — DISCUSSION/SUMMARY
[de-identified] : This patient has left hip pain which appears to be extra-articular on examination.  The radiographs do not appear to suggest loosening.  The MRI does show some evidence of concern for loosening.  I discussed with the patient the option of obtaining a bone scan to evaluate for loosening but she does not want to do that at this time.  The pain is likely extra-articular.  She has severe degenerative disc disease and lumbar spine.  Physical therapy prescribed.  She does not want to take oral NSAIDs instead opting to take Tylenol.  She refused a prescription for Mobic.  I referred her to my spine surgery colleagues for further evaluation.

## 2022-06-13 ENCOUNTER — APPOINTMENT (OUTPATIENT)
Dept: ORTHOPEDIC SURGERY | Facility: CLINIC | Age: 77
End: 2022-06-13
Payer: MEDICARE

## 2022-06-13 VITALS
BODY MASS INDEX: 27.6 KG/M2 | HEIGHT: 62 IN | SYSTOLIC BLOOD PRESSURE: 118 MMHG | DIASTOLIC BLOOD PRESSURE: 70 MMHG | WEIGHT: 150 LBS

## 2022-06-13 PROCEDURE — 99214 OFFICE O/P EST MOD 30 MIN: CPT

## 2022-06-13 PROCEDURE — 72110 X-RAY EXAM L-2 SPINE 4/>VWS: CPT

## 2022-06-13 NOTE — ASSESSMENT
[FreeTextEntry1] : I had a long discussion with the patient regards to her treatment plan and diagnosis.  She has tried extensive conservative management to date including epidural steroid injections, physical therapy, Tylenol, NSAIDs.  Unfortunately her symptoms have persisted.  Her last injection only provided a month of pain relief and unfortunately her symptoms of pain down her left leg to return.  In my opinion she does have a significant component of her pain which is coming from her spinal stenosis.  In my opinion she does have symptoms of lumbar radiculopathy and lumbar neurogenic claudication in the setting of severe spinal stenosis.  I think she may be a candidate for lumbar decompression surgery.  Explained to her that she has diffuse arthritis involving her spine and she disc surgery may not take away all of her back pain.  It may however help with some of her radicular symptoms down her left leg.  I discussed risks and benefits of this procedure including the recovery time involved with the surgery.  This would be at least 6 weeks of being unable to push pull or carry things of significant weight.  She will think through her options.  In the interim I would like to obtain an MRI given her symptoms weakness on exam.  The patient was agreement this plan.  I did write down her diagnosis and potential treatment plan.  I will have her follow-up when she decides how she wants to proceed.  We will also prescribe her a course of physical therapy.  Please note that over 40 minutes of time spent in care of this patient which includes previsit preparation, in person visit, postvisit documentation

## 2022-06-13 NOTE — HISTORY OF PRESENT ILLNESS
[de-identified] : This is a 76-year-old female is here today for evaluation of her low back and leg symptoms.  She does describe pain of her left posterior thigh posterior calf.  She has been dealing with the symptoms for a number of years.  She does state that the symptoms can be disabling at times.  She did have bilateral total hip replacements and there is some evidence of possible loosening of her left hip component.  She denies any bowel bladder issues.  She denies any saddle anesthesia.

## 2022-06-13 NOTE — PHYSICAL EXAM
[de-identified] : Lumbar Physical Exam\par \par Antalgic gait\par \par Reflexes\par Patellar - normal\par Gastroc - normal\par Clonus - No\par \par Hip Exam - Normal\par \par Straight leg raise - none\par \par Pulses - 2+ dp/pt\par \par Range of motion -reduced globally\par \par Sensation \par Sensation intact to light touch in L1, L2, L3, L4, L5 and S1 dermatomes bilaterally\par \par Motor\par 	IP	Quad	HS	TA	Gastroc	EHL\par Right	5/5	5/5	5/5	5/5	5/5	5/5\par Left	3+/5	5/5	5/5	5/5	5/5	5/5 [de-identified] : Lumbar radiographs\par 26 degrees of pelvic incidence\par 12 degrees of lumbar lordosis\par Significant facet arthropathy and degenerative scoliosis\par \par Lumbar MRI from 2020 reviewed\par Severe spinal stenosis at L4-L5\par Left-sided disc herniation L5-S1

## 2022-06-15 ENCOUNTER — APPOINTMENT (OUTPATIENT)
Dept: ORTHOPEDIC SURGERY | Facility: CLINIC | Age: 77
End: 2022-06-15
Payer: MEDICARE

## 2022-06-15 PROCEDURE — 20611 DRAIN/INJ JOINT/BURSA W/US: CPT

## 2022-06-15 PROCEDURE — 99214 OFFICE O/P EST MOD 30 MIN: CPT | Mod: 25

## 2022-06-21 NOTE — DISCUSSION/SUMMARY
[de-identified] : The natural progression of osteoarthritis was explained to the patient.  We discussed the possible treatment options from conservative to operative.  These included NSAIDS, Glucosamine and Chondrotin sulfate, and Physical Therapy as well different types of injections.  We also discussed that at some point they may progress to needed a TKA.  Information and pamphlets were given.\par \par We discussed their diagnosis and treatment options at length including surgical and non-surgical options. We will first attempt conservative treatment with activity modification, PT, icing, weight loss, and anti-inflammatory medications. We discussed the possible of injections (steroid and viscosupplementation) in the future. The patient was provided with a PT prescription to work on ROM, hip ER/abductors strengthening, quad/hamstring stretches and strengthening, and other exercises on the Knee Arthritis Protocol.\par \par Dx / Natural History:\par The patient was advised of the diagnosis.  The natural history of the pathology was explained in full to the patient in layman's terms.  Several different treatment options were discussed and explained in full to the patient including the risks and benefits of both surgical and non-surgical treatments.  All questions and concerns were answered. \par \par Pain Guide Activities:\par The patient was advised to let pain guide the gradual advancement of activities.\par \par Physical Therapy:\par The patient was provided with a prescription for Physical Therapy.\par \par Icing:\par The patient was advised to apply ice (wrapped in a towel or protective covering) to the area daily (20 minutes at a time, 2-4X/day).\par \par Follow up in 1 week for injection #2.

## 2022-06-21 NOTE — PROCEDURE
[FreeTextEntry3] : The risks, benefits, and alternatives to viscosupplementation injection were reviewed with the patient. Risks outlined include but are not limited to infection, sepsis, bleeding, scarring, temporary increase in pain, syncopal episode, failure to resolve symptoms, symptoms recurrence, allergic reaction, flare reaction, pseudoseptic reaction.  Patient understood the risks and asked to proceed with this treatment course.\par \par Large joint injection was performed of the left knee. The indication for this procedure was pain, inflammation and x-ray evidence of Osteoarthritis on this or prior visit. The site was prepped with alcohol, betadine, ethyl chloride sprayed topically and sterile technique used. An injection of Euflexxa, series #[] was used. \par Patient tolerated procedure well. Patient was advised to call if redness, pain or fever occur and apply ice for 15 minutes out of every hour for the next 12-24 hours as tolerated. \par \par Patient has tried OTC's including aspirin, Ibuprofen, Aleve, etc or prescription NSAIDS, and/or exercises at home and/or physical therapy without satisfactory response, patient had decreased mobility in the joint and the risks benefits, and alternatives have been discussed, and verbal consent was obtained. \par \par Ultrasound guidance was indicated for this patient due to precise injection in area of tear. All ultrasound images have been permanently captured and stored accordingly in our picture archiving and communication system. Visualization of the needle and placement of injection was performed without complication.

## 2022-06-21 NOTE — HISTORY OF PRESENT ILLNESS
[de-identified] : 06/15/2022 \par \par LUCY is presenting today for followup. Pain and symptoms are returning in her left and right knee. She has been trying to exercise and take her medications. She was seen by Dr. Vaughn for hip arthroplasty consultation. She denies any numbness/tingling/fevers/chills. \par \par She would like to try the Euflexxa injections in her left knee again today. [FreeTextEntry5] : Trina is here to F/U on her LT Knee.\chaparrita Still has swelling and slight pain in the knee.\par Stairs are still bothering her knee\chaparrita Would like to talk to you about her RT Knee as well.

## 2022-06-21 NOTE — PHYSICAL EXAM
[de-identified] : The patient is a well appearing 76 year old female of their stated age.\par Patient ambulates with a ANTALGIC gait.\par Negative straight leg raise bilateral\par \par General: in no acute distress, seated comfortably, moving easily\par Skin: No discoloration, rashes; on palpation skin is dry, \par Neuro: Normal sensation all dermatomes, motor all myotomes\par Vascular: Normal pulses, no edema, normal temperature\par Coordination and balance: Normal\par Psych: normal mood and affect, non pressured speech, alert and oriented x3\par \par Right Knee:                         	\par ROM:  0-145 degrees\par \par Lachman: Negative\par Pivot Shift: Negative\par Anterior Drawer: Negative\par Posterior Drawer / Sag: Negative\par Varus Stress 0 degrees: Stable\par Varus Stress 30 degrees: Stable\par Valgus Stress 0 degrees: Stable\par Valgus Stress 30 degrees: Stable\par Medial Kishore: Positive\par Lateral Kishore: Negative\par Patella Glide: 2+\par Patella Apprehension: Negative\par Patella Grind: Positive\par \par Palpation:\par Medial Joint Line: TTP\par Lateral Joint Line: TTP\par Medial Collateral Ligament: Nontender\par Lateral Collateral Ligament/PLC: Nontender\par Distal Femur: Nontender\par Proximal Tibia: Nontender\par Tibial Tubercle: Nontender\par Distal Pole Patella: Nontender\par Quadriceps Tendon: Nontender &  Intact\par Patella Tendon: Nontender &  Intact\par Medial Distal Hamstring/PES: Nontender\par Lateral Distal Hamstring: Nontender & Stable\par Iliotibial Band: Nontender\par Medial Patellofemoral Ligament: Nontender\par Adductor: Nontender\par Proximal GSC-Plantaris: Nontender\par Calf: Supple & Nontender\par \par Inspection:\par Deformity: No\par Erythema: No\par Ecchymosis: No\par Abrasions: No\par Effusion: Moderate\par Prepatellar Bursitis: No\par \par Neurologic Exam:\par Sensation L4-S1: Grossly Intact\par \par Motor Exam:\par Quadriceps: 5 out of 5\par Hamstrings: 5 out of 5\par EHL: 5 out of 5\par FHL: 5 out of 5\par TA: 5 out of 5\par GS: 5 out of 5\par \par Circulatory/Pulses:\par Dorsalis Pedis: 2+\par Posterior Tibialis: 2+\par \par Additional Pertinent Findings: None\par \par Left Knee:                           	\par ROM:  0-145 degrees\par \par Lachman: Negative\par Pivot Shift: Negative\par Anterior Drawer: Negative\par Posterior Drawer / Sag: Negative\par Varus Stress 0 degrees: Stable\par Varus Stress 30 degrees: Stable\par Valgus Stress 0 degrees: Stable\par Valgus Stress 30 degrees: Stable\par Medial Kishore: Positive\par Lateral Kishore: Negative\par Patella Glide: 2+\par Patella Apprehension: Negative\par Patella Grind: Positive\par \par Palpation:\par Medial Joint Line: TTP\par Lateral Joint Line: TTP\par Medial Collateral Ligament: Nontender\par Lateral Collateral Ligament/PLC: Nontender\par Distal Femur: Nontender\par Proximal Tibia: Nontender\par Tibial Tubercle: Nontender\par Distal Pole Patella: Nontender\par Quadriceps Tendon: Nontender &  Intact\par Patella Tendon: Nontender &  Intact\par Medial Distal Hamstring/PES: Nontender\par Lateral Distal Hamstring: Nontender & Stable\par Iliotibial Band: Nontender\par Medial Patellofemoral Ligament: Nontender\par Adductor: Nontender\par Proximal GSC-Plantaris: Nontender\par Calf: Supple & Nontender\par \par Inspection:\par Deformity: No\par Erythema: No\par Ecchymosis: No\par Abrasions: No\par Effusion: Moderate\par Prepatellar Bursitis: No\par \par Neurologic Exam:\par Sensation L4-S1: Grossly Intact\par \par Motor Exam:\par Quadriceps: 5 out of 5\par Hamstrings: 5 out of 5\par EHL: 5 out of 5\par FHL: 5 out of 5\par TA: 5 out of 5\par GS: 5 out of 5\par \par Circulatory/Pulses:\par Dorsalis Pedis: 2+\par Posterior Tibialis: 2+

## 2022-06-22 ENCOUNTER — APPOINTMENT (OUTPATIENT)
Dept: ORTHOPEDIC SURGERY | Facility: CLINIC | Age: 77
End: 2022-06-22

## 2022-06-22 ENCOUNTER — APPOINTMENT (OUTPATIENT)
Dept: ORTHOPEDIC SURGERY | Facility: CLINIC | Age: 77
End: 2022-06-22
Payer: MEDICARE

## 2022-06-22 PROCEDURE — 99213 OFFICE O/P EST LOW 20 MIN: CPT | Mod: 25

## 2022-06-22 PROCEDURE — 20611 DRAIN/INJ JOINT/BURSA W/US: CPT

## 2022-06-28 NOTE — PROCEDURE
[FreeTextEntry3] : The risks, benefits, and alternatives to viscosupplementation injection were reviewed with the patient. Risks outlined include but are not limited to infection, sepsis, bleeding, scarring, temporary increase in pain, syncopal episode, failure to resolve symptoms, symptoms recurrence, allergic reaction, flare reaction, pseudoseptic reaction.  Patient understood the risks and asked to proceed with this treatment course.\par \par Large joint injection was performed of the left knee. The indication for this procedure was pain, inflammation and x-ray evidence of Osteoarthritis on this or prior visit. The site was prepped with alcohol, betadine, ethyl chloride sprayed topically and sterile technique used. An injection of Euflexxa, series #2 was used. \par Patient tolerated procedure well. Patient was advised to call if redness, pain or fever occur and apply ice for 15 minutes out of every hour for the next 12-24 hours as tolerated. \par \par Patient has tried OTC's including aspirin, Ibuprofen, Aleve, etc or prescription NSAIDS, and/or exercises at home and/or physical therapy without satisfactory response, patient had decreased mobility in the joint and the risks benefits, and alternatives have been discussed, and verbal consent was obtained. \par \par Ultrasound guidance was indicated for this patient due to precise injection in area of tear. All ultrasound images have been permanently captured and stored accordingly in our picture archiving and communication system. Visualization of the needle and placement of injection was performed without complication.

## 2022-06-28 NOTE — DISCUSSION/SUMMARY
[de-identified] : The natural progression of osteoarthritis was explained to the patient.  We discussed the possible treatment options from conservative to operative.  These included NSAIDS, Glucosamine and Chondrotin sulfate, and Physical Therapy as well different types of injections.  We also discussed that at some point they may progress to needed a TKA.  Information and pamphlets were given.\par \par We discussed their diagnosis and treatment options at length including surgical and non-surgical options. We will first attempt conservative treatment with activity modification, PT, icing, weight loss, and anti-inflammatory medications. We discussed the possible of injections (steroid and viscosupplementation) in the future. The patient was provided with a PT prescription to work on ROM, hip ER/abductors strengthening, quad/hamstring stretches and strengthening, and other exercises on the Knee Arthritis Protocol.\par \par Dx / Natural History:\par The patient was advised of the diagnosis.  The natural history of the pathology was explained in full to the patient in layman's terms.  Several different treatment options were discussed and explained in full to the patient including the risks and benefits of both surgical and non-surgical treatments.  All questions and concerns were answered. \par \par Pain Guide Activities:\par The patient was advised to let pain guide the gradual advancement of activities.\par \par Physical Therapy:\par The patient was provided with a prescription for Physical Therapy.\par \par Icing:\par The patient was advised to apply ice (wrapped in a towel or protective covering) to the area daily (20 minutes at a time, 2-4X/day).

## 2022-06-28 NOTE — HISTORY OF PRESENT ILLNESS
[Euflexxa] : Euflexxa [Right Leg] : right leg [2] : 2 [0] : 0 [Dull/Aching] : dull/aching [Occasional] : occasional [Household chores] : household chores [Leisure] : leisure [Rest] : rest [Standing] : standing [Walking] : walking [Exercising] : exercising [Stairs] : stairs [] : yes [FreeTextEntry5] : pt reports minimal pain  [de-identified] : 6/15/22 [de-identified] : RT knee

## 2022-06-28 NOTE — PHYSICAL EXAM
[de-identified] : The patient is a well appearing 76 year old female of their stated age.\par Patient ambulates with a ANTALGIC gait.\par Negative straight leg raise bilateral\par \par General: in no acute distress, seated comfortably, moving easily\par Skin: No discoloration, rashes; on palpation skin is dry, \par Neuro: Normal sensation all dermatomes, motor all myotomes\par Vascular: Normal pulses, no edema, normal temperature\par Coordination and balance: Normal\par Psych: normal mood and affect, non pressured speech, alert and oriented x3\par \par Right Knee:                         	\par ROM:  0-145 degrees\par \par Lachman: Negative\par Pivot Shift: Negative\par Anterior Drawer: Negative\par Posterior Drawer / Sag: Negative\par Varus Stress 0 degrees: Stable\par Varus Stress 30 degrees: Stable\par Valgus Stress 0 degrees: Stable\par Valgus Stress 30 degrees: Stable\par Medial Kishore: Positive\par Lateral Kishore: Negative\par Patella Glide: 2+\par Patella Apprehension: Negative\par Patella Grind: Positive\par \par Palpation:\par Medial Joint Line: TTP\par Lateral Joint Line: TTP\par Medial Collateral Ligament: Nontender\par Lateral Collateral Ligament/PLC: Nontender\par Distal Femur: Nontender\par Proximal Tibia: Nontender\par Tibial Tubercle: Nontender\par Distal Pole Patella: Nontender\par Quadriceps Tendon: Nontender &  Intact\par Patella Tendon: Nontender &  Intact\par Medial Distal Hamstring/PES: Nontender\par Lateral Distal Hamstring: Nontender & Stable\par Iliotibial Band: Nontender\par Medial Patellofemoral Ligament: Nontender\par Adductor: Nontender\par Proximal GSC-Plantaris: Nontender\par Calf: Supple & Nontender\par \par Inspection:\par Deformity: No\par Erythema: No\par Ecchymosis: No\par Abrasions: No\par Effusion: Moderate\par Prepatellar Bursitis: No\par \par Neurologic Exam:\par Sensation L4-S1: Grossly Intact\par \par Motor Exam:\par Quadriceps: 5 out of 5\par Hamstrings: 5 out of 5\par EHL: 5 out of 5\par FHL: 5 out of 5\par TA: 5 out of 5\par GS: 5 out of 5\par \par Circulatory/Pulses:\par Dorsalis Pedis: 2+\par Posterior Tibialis: 2+\par \par Additional Pertinent Findings: None\par \par Left Knee:                           	\par ROM:  0-145 degrees\par \par Lachman: Negative\par Pivot Shift: Negative\par Anterior Drawer: Negative\par Posterior Drawer / Sag: Negative\par Varus Stress 0 degrees: Stable\par Varus Stress 30 degrees: Stable\par Valgus Stress 0 degrees: Stable\par Valgus Stress 30 degrees: Stable\par Medial Kishore: Positive\par Lateral Kishore: Negative\par Patella Glide: 2+\par Patella Apprehension: Negative\par Patella Grind: Positive\par \par Palpation:\par Medial Joint Line: TTP\par Lateral Joint Line: TTP\par Medial Collateral Ligament: Nontender\par Lateral Collateral Ligament/PLC: Nontender\par Distal Femur: Nontender\par Proximal Tibia: Nontender\par Tibial Tubercle: Nontender\par Distal Pole Patella: Nontender\par Quadriceps Tendon: Nontender &  Intact\par Patella Tendon: Nontender &  Intact\par Medial Distal Hamstring/PES: Nontender\par Lateral Distal Hamstring: Nontender & Stable\par Iliotibial Band: Nontender\par Medial Patellofemoral Ligament: Nontender\par Adductor: Nontender\par Proximal GSC-Plantaris: Nontender\par Calf: Supple & Nontender\par \par Inspection:\par Deformity: No\par Erythema: No\par Ecchymosis: No\par Abrasions: No\par Effusion: Moderate\par Prepatellar Bursitis: No\par \par Neurologic Exam:\par Sensation L4-S1: Grossly Intact\par \par Motor Exam:\par Quadriceps: 5 out of 5\par Hamstrings: 5 out of 5\par EHL: 5 out of 5\par FHL: 5 out of 5\par TA: 5 out of 5\par GS: 5 out of 5\par \par Circulatory/Pulses:\par Dorsalis Pedis: 2+\par Posterior Tibialis: 2+

## 2022-06-29 ENCOUNTER — APPOINTMENT (OUTPATIENT)
Dept: ORTHOPEDIC SURGERY | Facility: CLINIC | Age: 77
End: 2022-06-29

## 2022-06-29 PROCEDURE — 99213 OFFICE O/P EST LOW 20 MIN: CPT | Mod: 25

## 2022-06-29 PROCEDURE — 20610 DRAIN/INJ JOINT/BURSA W/O US: CPT

## 2022-06-29 NOTE — HISTORY OF PRESENT ILLNESS
[Euflexxa] : Euflexxa [Right Leg] : right leg [2] : 2 [0] : 0 [Dull/Aching] : dull/aching [Occasional] : occasional [Household chores] : household chores [Leisure] : leisure [Rest] : rest [Standing] : standing [Walking] : walking [Exercising] : exercising [Stairs] : stairs [3] : 3 [] : yes [FreeTextEntry1] : RT knee [FreeTextEntry5] : pt reports minimal pain  [de-identified] : 6/22/22 [de-identified] : RT knee

## 2022-06-29 NOTE — PHYSICAL EXAM
[de-identified] : The patient is a well appearing 76 year old female of their stated age.\par Patient ambulates with a ANTALGIC gait.\par Negative straight leg raise bilateral\par \par General: in no acute distress, seated comfortably, moving easily\par Skin: No discoloration, rashes; on palpation skin is dry, \par Neuro: Normal sensation all dermatomes, motor all myotomes\par Vascular: Normal pulses, no edema, normal temperature\par Coordination and balance: Normal\par Psych: normal mood and affect, non pressured speech, alert and oriented x3\par \par Right Knee:                         	\par ROM:  0-145 degrees\par \par Lachman: Negative\par Pivot Shift: Negative\par Anterior Drawer: Negative\par Posterior Drawer / Sag: Negative\par Varus Stress 0 degrees: Stable\par Varus Stress 30 degrees: Stable\par Valgus Stress 0 degrees: Stable\par Valgus Stress 30 degrees: Stable\par Medial Kishore: Positive\par Lateral Kishore: Negative\par Patella Glide: 2+\par Patella Apprehension: Negative\par Patella Grind: Positive\par \par Palpation:\par Medial Joint Line: TTP\par Lateral Joint Line: TTP\par Medial Collateral Ligament: Nontender\par Lateral Collateral Ligament/PLC: Nontender\par Distal Femur: Nontender\par Proximal Tibia: Nontender\par Tibial Tubercle: Nontender\par Distal Pole Patella: Nontender\par Quadriceps Tendon: Nontender &  Intact\par Patella Tendon: Nontender &  Intact\par Medial Distal Hamstring/PES: Nontender\par Lateral Distal Hamstring: Nontender & Stable\par Iliotibial Band: Nontender\par Medial Patellofemoral Ligament: Nontender\par Adductor: Nontender\par Proximal GSC-Plantaris: Nontender\par Calf: Supple & Nontender\par \par Inspection:\par Deformity: No\par Erythema: No\par Ecchymosis: No\par Abrasions: No\par Effusion: Moderate\par Prepatellar Bursitis: No\par \par Neurologic Exam:\par Sensation L4-S1: Grossly Intact\par \par Motor Exam:\par Quadriceps: 5 out of 5\par Hamstrings: 5 out of 5\par EHL: 5 out of 5\par FHL: 5 out of 5\par TA: 5 out of 5\par GS: 5 out of 5\par \par Circulatory/Pulses:\par Dorsalis Pedis: 2+\par Posterior Tibialis: 2+\par \par Additional Pertinent Findings: None\par \par Left Knee:                           	\par ROM:  0-145 degrees\par \par Lachman: Negative\par Pivot Shift: Negative\par Anterior Drawer: Negative\par Posterior Drawer / Sag: Negative\par Varus Stress 0 degrees: Stable\par Varus Stress 30 degrees: Stable\par Valgus Stress 0 degrees: Stable\par Valgus Stress 30 degrees: Stable\par Medial Kishore: Positive\par Lateral Kishore: Negative\par Patella Glide: 2+\par Patella Apprehension: Negative\par Patella Grind: Positive\par \par Palpation:\par Medial Joint Line: TTP\par Lateral Joint Line: TTP\par Medial Collateral Ligament: Nontender\par Lateral Collateral Ligament/PLC: Nontender\par Distal Femur: Nontender\par Proximal Tibia: Nontender\par Tibial Tubercle: Nontender\par Distal Pole Patella: Nontender\par Quadriceps Tendon: Nontender &  Intact\par Patella Tendon: Nontender &  Intact\par Medial Distal Hamstring/PES: Nontender\par Lateral Distal Hamstring: Nontender & Stable\par Iliotibial Band: Nontender\par Medial Patellofemoral Ligament: Nontender\par Adductor: Nontender\par Proximal GSC-Plantaris: Nontender\par Calf: Supple & Nontender\par \par Inspection:\par Deformity: No\par Erythema: No\par Ecchymosis: No\par Abrasions: No\par Effusion: Moderate\par Prepatellar Bursitis: No\par \par Neurologic Exam:\par Sensation L4-S1: Grossly Intact\par \par Motor Exam:\par Quadriceps: 5 out of 5\par Hamstrings: 5 out of 5\par EHL: 5 out of 5\par FHL: 5 out of 5\par TA: 5 out of 5\par GS: 5 out of 5\par \par Circulatory/Pulses:\par Dorsalis Pedis: 2+\par Posterior Tibialis: 2+

## 2022-06-29 NOTE — PROCEDURE
[FreeTextEntry3] : The risks, benefits, and alternatives to viscosupplementation injection were reviewed with the patient. Risks outlined include but are not limited to infection, sepsis, bleeding, scarring, temporary increase in pain, syncopal episode, failure to resolve symptoms, symptoms recurrence, allergic reaction, flare reaction, pseudoseptic reaction.  Patient understood the risks and asked to proceed with this treatment course.\par \par Large joint injection was performed of the left knee. The indication for this procedure was pain, inflammation and x-ray evidence of Osteoarthritis on this or prior visit. The site was prepped with alcohol, betadine, ethyl chloride sprayed topically and sterile technique used. An injection of Euflexxa, series #3 was used. \par Patient tolerated procedure well. Patient was advised to call if redness, pain or fever occur and apply ice for 15 minutes out of every hour for the next 12-24 hours as tolerated. \par \par Patient has tried OTC's including aspirin, Ibuprofen, Aleve, etc or prescription NSAIDS, and/or exercises at home and/or physical therapy without satisfactory response, patient had decreased mobility in the joint and the risks benefits, and alternatives have been discussed, and verbal consent was obtained. \par \par Ultrasound guidance was indicated for this patient due to precise injection in area of tear. All ultrasound images have been permanently captured and stored accordingly in our picture archiving and communication system. Visualization of the needle and placement of injection was performed without complication.

## 2022-06-29 NOTE — DISCUSSION/SUMMARY
[de-identified] : The natural progression of osteoarthritis was explained to the patient.  We discussed the possible treatment options from conservative to operative.  These included NSAIDS, Glucosamine and Chondrotin sulfate, and Physical Therapy as well different types of injections.  We also discussed that at some point they may progress to needed a TKA.  Information and pamphlets were given.\par \par We discussed their diagnosis and treatment options at length including surgical and non-surgical options. We will first attempt conservative treatment with activity modification, PT, icing, weight loss, and anti-inflammatory medications. We discussed the possible of injections (steroid and viscosupplementation) in the future. The patient was provided with a PT prescription to work on ROM, hip ER/abductors strengthening, quad/hamstring stretches and strengthening, and other exercises on the Knee Arthritis Protocol.\par \par Dx / Natural History:\par The patient was advised of the diagnosis.  The natural history of the pathology was explained in full to the patient in layman's terms.  Several different treatment options were discussed and explained in full to the patient including the risks and benefits of both surgical and non-surgical treatments.  All questions and concerns were answered. \par \par Pain Guide Activities:\par The patient was advised to let pain guide the gradual advancement of activities.\par \par Physical Therapy:\par The patient was provided with a prescription for Physical Therapy.\par \par Icing:\par The patient was advised to apply ice (wrapped in a towel or protective covering) to the area daily (20 minutes at a time, 2-4X/day).

## 2022-07-06 ENCOUNTER — APPOINTMENT (OUTPATIENT)
Dept: ORTHOPEDIC SURGERY | Facility: CLINIC | Age: 77
End: 2022-07-06

## 2022-07-06 VITALS
HEART RATE: 99 BPM | DIASTOLIC BLOOD PRESSURE: 70 MMHG | OXYGEN SATURATION: 95 % | BODY MASS INDEX: 27.6 KG/M2 | WEIGHT: 150 LBS | HEIGHT: 62 IN | SYSTOLIC BLOOD PRESSURE: 108 MMHG

## 2022-07-06 DIAGNOSIS — M54.41 LUMBAGO WITH SCIATICA, LEFT SIDE: ICD-10-CM

## 2022-07-06 DIAGNOSIS — M54.42 LUMBAGO WITH SCIATICA, LEFT SIDE: ICD-10-CM

## 2022-07-06 DIAGNOSIS — G89.29 LUMBAGO WITH SCIATICA, LEFT SIDE: ICD-10-CM

## 2022-07-06 PROCEDURE — 99215 OFFICE O/P EST HI 40 MIN: CPT

## 2022-07-06 NOTE — PHYSICAL EXAM
[de-identified] : Lumbar Physical Exam\par \par Antalgic gait\par \par Reflexes\par Patellar - normal\par Gastroc - normal\par Clonus - No\par \par Hip Exam - Normal\par \par Straight leg raise - none\par \par Pulses - 2+ dp/pt\par \par Range of motion -reduced globally\par \par Sensation \par Sensation intact to light touch in L1, L2, L3, L4, L5 and S1 dermatomes bilaterally\par \par Motor\par 	IP	Quad	HS	TA	Gastroc	EHL\par Right	5/5	5/5	5/5	5/5	5/5	5/5\par Left	5/5	5/5	5/5	4/5	4/5	4/5 [de-identified] : Lumbar radiographs\par 26 degrees of pelvic incidence\par 12 degrees of lumbar lordosis\par Significant facet arthropathy and degenerative scoliosis\par \par Lumbar MRI from 2020 reviewed\par Severe spinal stenosis at L4-L5\par Left-sided disc herniation L5-S1\par \par New lumbar MRI reviewed\par Severe spinal stenosis at L4-L5\par Severe spinal stenosis at L5-S1

## 2022-07-06 NOTE — ASSESSMENT
[FreeTextEntry1] : This is a 76-year-old female with a history of scoliosis who presents today with lumbar radiculopathy type symptoms.  She does have severe spinal stenosis L4-S1.  Today discussed further her new MRI findings.  She does have signs and symptoms concerning for lumbar radiculopathy secondary to the spinal stenosis.  I do think that she is an appropriate surgical candidate for an L4-S1 decompression using minimally invasive techniques.  I did discuss with her that she may need a larger thoracolumbar fusion but she has no appetite for this type of large surgery at this point.  I think proceeding with a smaller decompression procedure for symptom relief is a reasonable option.  I will have her follow-up in October as she would like to proceed with surgery November.  All questions were answered.  I did give her my direct contact information and encouraged her to reach out to me at any time if she has any questions in regards to care.

## 2022-07-06 NOTE — HISTORY OF PRESENT ILLNESS
[de-identified] : Today the patient states that she is still dealing with significant back and leg symptoms.  She does have pain which radiates down her left posterior lateral thigh posterior lateral calf.  At times the symptoms are disabling.  She denies any bowel bladder issues.  She denies any saddle anesthesia.  She is here today to go over MRI results.\par \par 06/13/22\par This is a 76-year-old female is here today for evaluation of her low back and leg symptoms.  She does describe pain of her left posterior thigh posterior calf.  She has been dealing with the symptoms for a number of years.  She does state that the symptoms can be disabling at times.  She did have bilateral total hip replacements and there is some evidence of possible loosening of her left hip component.  She denies any bowel bladder issues.  She denies any saddle anesthesia.

## 2022-09-15 DIAGNOSIS — M54.9 DORSALGIA, UNSPECIFIED: ICD-10-CM

## 2022-09-15 DIAGNOSIS — M41.126 ADOLESCENT IDIOPATHIC SCOLIOSIS, LUMBAR REGION: ICD-10-CM

## 2022-10-24 ENCOUNTER — APPOINTMENT (OUTPATIENT)
Dept: ORTHOPEDIC SURGERY | Facility: CLINIC | Age: 77
End: 2022-10-24

## 2022-10-24 VITALS
DIASTOLIC BLOOD PRESSURE: 79 MMHG | BODY MASS INDEX: 27.6 KG/M2 | SYSTOLIC BLOOD PRESSURE: 124 MMHG | WEIGHT: 150 LBS | HEIGHT: 62 IN

## 2022-10-24 DIAGNOSIS — M51.36 OTHER INTERVERTEBRAL DISC DEGENERATION, LUMBAR REGION: ICD-10-CM

## 2022-10-24 PROCEDURE — 99215 OFFICE O/P EST HI 40 MIN: CPT

## 2022-10-24 NOTE — HISTORY OF PRESENT ILLNESS
[de-identified] : Today the patient states that she is still dealing with some fairly significant back and leg symptoms.  She does have pain which does radiate down her left posterior thigh posterior calf.  At times the symptoms are disabling.  She denies any bowel bladder issues.  She denies any saddle anesthesia.  She is hopeful that she can avoid any surgical intervention although she does state that her symptoms can be disabling at times.\par \par 07/06/22\par Today the patient states that she is still dealing with significant back and leg symptoms.  She does have pain which radiates down her left posterior lateral thigh posterior lateral calf.  At times the symptoms are disabling.  She denies any bowel bladder issues.  She denies any saddle anesthesia.  She is here today to go over MRI results.\par \par 06/13/22\par This is a 76-year-old female is here today for evaluation of her low back and leg symptoms.  She does describe pain of her left posterior thigh posterior calf.  She has been dealing with the symptoms for a number of years.  She does state that the symptoms can be disabling at times.  She did have bilateral total hip replacements and there is some evidence of possible loosening of her left hip component.  She denies any bowel bladder issues.  She denies any saddle anesthesia.

## 2022-10-24 NOTE — PHYSICAL EXAM
[de-identified] : Lumbar Physical Exam\par \par Antalgic gait\par \par Reflexes\par Patellar - normal\par Gastroc - normal\par Clonus - No\par \par Hip Exam - Normal\par \par Straight leg raise - none\par \par Pulses - 2+ dp/pt\par \par Range of motion -reduced globally\par \par Sensation \par Sensation intact to light touch in L1, L2, L3, L4, L5 and S1 dermatomes bilaterally\par \par Motor\par 	IP	Quad	HS	TA	Gastroc	EHL\par Right	5/5	5/5	5/5	5/5	5/5	5/5\par Left	5/5	5/5	5/5	4/5	4/5	4/5 [de-identified] : Lumbar radiographs\par 26 degrees of pelvic incidence\par 12 degrees of lumbar lordosis\par Significant facet arthropathy and degenerative scoliosis\par \par Lumbar MRI from 2020 reviewed\par Severe spinal stenosis at L4-L5\par Left-sided disc herniation L5-S1\par \par New lumbar MRI reviewed\par Severe spinal stenosis at L4-L5\par Severe spinal stenosis at L5-S1

## 2022-10-24 NOTE — ASSESSMENT
[FreeTextEntry1] : I had another long discussion with the patient in regards to her treatment plan and diagnosis.  Discussed pros and cons of various treatment modalities.  I did discuss with her an operative intervention at this point would be reasonable option given her significant stenosis within her lumbar spine.  Trying to avoid a larger surgery and just focusing on a decompression surgery could be an option.  I counseled her that she may need a larger thoracolumbar fusion in the future.  At this point the patient would like to try further physical therapy to try to alleviate her current symptoms.  I will have her follow-up with me in approximately 6 to 8 weeks.  Encouraged her to reach out to me sooner if she has any new or worsening symptoms.

## 2023-01-04 ENCOUNTER — APPOINTMENT (OUTPATIENT)
Dept: MAMMOGRAPHY | Facility: IMAGING CENTER | Age: 78
End: 2023-01-04
Payer: MEDICARE

## 2023-01-04 ENCOUNTER — OUTPATIENT (OUTPATIENT)
Dept: OUTPATIENT SERVICES | Facility: HOSPITAL | Age: 78
LOS: 1 days | End: 2023-01-04
Payer: MEDICARE

## 2023-01-04 ENCOUNTER — APPOINTMENT (OUTPATIENT)
Dept: ULTRASOUND IMAGING | Facility: IMAGING CENTER | Age: 78
End: 2023-01-04
Payer: MEDICARE

## 2023-01-04 DIAGNOSIS — Z98.890 OTHER SPECIFIED POSTPROCEDURAL STATES: Chronic | ICD-10-CM

## 2023-01-04 DIAGNOSIS — Z00.8 ENCOUNTER FOR OTHER GENERAL EXAMINATION: ICD-10-CM

## 2023-01-04 DIAGNOSIS — Z96.641 PRESENCE OF RIGHT ARTIFICIAL HIP JOINT: Chronic | ICD-10-CM

## 2023-01-04 DIAGNOSIS — Z90.49 ACQUIRED ABSENCE OF OTHER SPECIFIED PARTS OF DIGESTIVE TRACT: Chronic | ICD-10-CM

## 2023-01-04 PROCEDURE — 77066 DX MAMMO INCL CAD BI: CPT | Mod: 26

## 2023-01-04 PROCEDURE — G0279: CPT

## 2023-01-04 PROCEDURE — 76641 ULTRASOUND BREAST COMPLETE: CPT

## 2023-01-04 PROCEDURE — 76641 ULTRASOUND BREAST COMPLETE: CPT | Mod: 26,50

## 2023-01-04 PROCEDURE — 77066 DX MAMMO INCL CAD BI: CPT

## 2023-01-04 PROCEDURE — G0279: CPT | Mod: 26

## 2023-02-27 ENCOUNTER — APPOINTMENT (OUTPATIENT)
Dept: SURGERY | Facility: CLINIC | Age: 78
End: 2023-02-27
Payer: MEDICARE

## 2023-02-27 VITALS
HEIGHT: 62 IN | OXYGEN SATURATION: 98 % | DIASTOLIC BLOOD PRESSURE: 81 MMHG | HEART RATE: 93 BPM | WEIGHT: 150 LBS | RESPIRATION RATE: 17 BRPM | SYSTOLIC BLOOD PRESSURE: 119 MMHG | TEMPERATURE: 97 F | BODY MASS INDEX: 27.6 KG/M2

## 2023-02-27 PROCEDURE — 99214 OFFICE O/P EST MOD 30 MIN: CPT

## 2023-02-27 NOTE — HISTORY OF PRESENT ILLNESS
[de-identified] : LUCY is a 77 year old female here for a reconsult for a possible hiatal hernia.Last seen on 02/28/22.  77-year-old female with known large hiatal was seen 1 year ago with similar complaints of occasional dysphagia nausea and vomiting she returns today for evaluation.  Continues to have nausea regurgitation nocturnal regurgitation what sounds like at least once a week mostly when she eats a heavy meal.  We have no further imaging at this time though she does say she had a MRI this week but we do not have that to look at at the present time she also has pulmonary issues she has bronchitis and occasional aspiration from the regurgitation.  He would like to discuss possible repair of this hiatal hernia

## 2023-02-27 NOTE — PHYSICAL EXAM
[JVD] : no jugular venous distention  [Normal Breath Sounds] : Normal breath sounds [Normal Heart Sounds] : normal heart sounds [No HSM] : no hepatosplenomegaly [Tender] : nontender [Enlarged] : not enlarged [No Rash or Lesion] : No rash or lesion [Calm] : calm [de-identified] : Ambulating with slight difficulty due to arthritis of her knees [de-identified] : Nonicteric no adenopathy [de-identified] : Soft nontender nondistended no hernias or masses are present [de-identified] : Limited range of motion due to age and arthritis [de-identified] : Cranial nerves II through XII grossly intact

## 2023-02-27 NOTE — REVIEW OF SYSTEMS
[As Noted in HPI] : as noted in HPI [Vomiting] : vomiting [Negative] : Heme/Lymph [FreeTextEntry6] : Bronchitis [FreeTextEntry7] : Nocturnal regurgitation

## 2023-02-27 NOTE — ASSESSMENT
[FreeTextEntry1] : 77-year-old female with moderate to large hiatal hernia.  Last CAT scan was 2019 we do not have any imaging at this visit though she does say she completed a CT or MRI recently based on her symptoms and the previous radiologic findings she has a large hiatal hernia with nocturnal regurgitation pulmonary complications and weekly vomiting given those findings we have recommended a repair of this hiatal hernia we have discussed at length the risk benefits and expectations of the procedure The risk and benefits of the procedure were discussed with patient. The risk including, but not limited to, risk of bleeding, infection, recurrence, injury to other organs (nerves, esophagus, stomach, liver, etc), bloating, wrap being too tight and requiring reoperation or other interventions, delayed gastric emptying and persistent reflux. Several general anesthesia risks like MI, Stroke, respiratory complications and death were discussed. Need for modified diet postoperatively was discussed. All questions were answered and education materials were provided.\par We will obtain the MRI she would like to have this repaired in April\par \par \par

## 2023-03-01 ENCOUNTER — APPOINTMENT (OUTPATIENT)
Dept: ORTHOPEDIC SURGERY | Facility: CLINIC | Age: 78
End: 2023-03-01

## 2023-04-24 ENCOUNTER — APPOINTMENT (OUTPATIENT)
Dept: SURGERY | Facility: CLINIC | Age: 78
End: 2023-04-24
Payer: MEDICARE

## 2023-04-24 VITALS
DIASTOLIC BLOOD PRESSURE: 80 MMHG | RESPIRATION RATE: 17 BRPM | SYSTOLIC BLOOD PRESSURE: 122 MMHG | OXYGEN SATURATION: 95 % | TEMPERATURE: 97.2 F | HEART RATE: 92 BPM

## 2023-04-24 PROCEDURE — 99214 OFFICE O/P EST MOD 30 MIN: CPT

## 2023-04-24 RX ORDER — PANCRELIPASE 36000; 180000; 114000 [USP'U]/1; [USP'U]/1; [USP'U]/1
36000-114000 CAPSULE, DELAYED RELEASE PELLETS ORAL
Qty: 360 | Refills: 0 | Status: DISCONTINUED | COMMUNITY
Start: 2023-03-15

## 2023-04-24 RX ORDER — AMOXICILLIN AND CLAVULANATE POTASSIUM 875; 125 MG/1; MG/1
875-125 TABLET, COATED ORAL
Qty: 10 | Refills: 0 | Status: DISCONTINUED | COMMUNITY
Start: 2023-02-06

## 2023-04-24 RX ORDER — AMOXICILLIN 500 MG/1
500 CAPSULE ORAL
Qty: 4 | Refills: 0 | Status: DISCONTINUED | COMMUNITY
Start: 2022-11-16

## 2023-04-24 RX ORDER — DOXYCYCLINE HYCLATE 100 MG/1
100 TABLET ORAL
Qty: 10 | Refills: 0 | Status: DISCONTINUED | COMMUNITY
Start: 2023-01-05

## 2023-04-24 RX ORDER — CICLOPIROX OLAMINE 7.7 MG/G
0.77 CREAM TOPICAL
Qty: 90 | Refills: 0 | Status: DISCONTINUED | COMMUNITY
Start: 2023-01-17

## 2023-04-24 RX ORDER — NEOMYCIN SULFATE, POLYMYXIN B SULFATE AND DEXAMETHASONE 3.5; 10000; 1 MG/ML; [USP'U]/ML; MG/ML
3.5-10000-0.1 SUSPENSION OPHTHALMIC
Qty: 5 | Refills: 0 | Status: DISCONTINUED | COMMUNITY
Start: 2023-04-03

## 2023-05-26 NOTE — PHYSICAL EXAM
[Normal Breath Sounds] : Normal breath sounds [Normal Heart Sounds] : normal heart sounds [No HSM] : no hepatosplenomegaly [No Rash or Lesion] : No rash or lesion [Calm] : calm [JVD] : no jugular venous distention  [Abdominal Masses] : No abdominal masses [Abdomen Tenderness] : ~T ~M No abdominal tenderness [Tender] : was nontender [de-identified] : ambulating with a cane [de-identified] : non icteric [de-identified] : b/l breath sounds [de-identified] : +s1/s2 [de-identified] : soft nt nd no hernia or masses [de-identified] : FROM sl limited due to Knee r<L [de-identified] : CN  2-12 grossly intact

## 2023-05-26 NOTE — HISTORY OF PRESENT ILLNESS
[de-identified] : LUCY is a 77 year old female here for a reconsult for a possible hiatal \par \par Last seen 2/27/23 - 77-year-old female with moderate to large hiatal hernia. Last CAT scan was 2019 we do not have any imaging at this visit though she does say she completed a CT or MRI recently based on her symptoms and the previous radiologic findings she has a large hiatal hernia with nocturnal regurgitation pulmonary complications and weekly vomiting given those findings we have recommended a repair of this hiatal hernia we have discussed at length the risk benefits and expectations of the procedure The risk and benefits of the procedure were discussed with patient. The risk including, but not limited to, risk of bleeding, infection, recurrence, injury to other organs (nerves, esophagus, stomach, liver, etc), bloating, wrap being too tight and requiring reoperation or other interventions, delayed gastric emptying and persistent reflux. Several general anesthesia risks like MI, Stroke, respiratory complications and death were discussed. Need for modified diet postoperatively was discussed. All questions were answered and education materials were provided. We will obtain the MRI images, she would like to have this repaired in April. \par \par Has been having nausea and vomiting (especially when eating late at night), 1-2 times per week. Does have acid reflux. Denies fevers or chills. Normal daily BMs, rare episodes of constipation or diarrhea. Good appetite, avoids eating spicy food and meat. Not taking anticoagulants. \par \par

## 2023-05-26 NOTE — REVIEW OF SYSTEMS
[Negative] : Heme/Lymph [As Noted in HPI] : as noted in HPI [Vomiting] : vomiting [Heartburn] : heartburn [Abdominal Pain] : no abdominal pain [Constipation] : no constipation [Diarrhea] : no diarrhea [Melena] : no melena

## 2023-05-26 NOTE — ASSESSMENT
[FreeTextEntry1] : 77-year-old female with persistent dysphagia/reflux in setting of large hiatal hernia recent MRI documents a large hiatal hernia.  She is planning an endoscopy soon and wants to have repaired in the middle to end of June as before all the risk benefits and expectations were discussed at length with the patient all of her questions were answered she will see us before the operation [place]

## 2023-05-26 NOTE — PLAN
[FreeTextEntry1] : I have recommended laparoscopic paraesophageal hernia repair with Nissen fundoplication.  We discussed risks and benefits of the procedure, including recurrence rate of 10-20%, gas bloat, dysphagia, bleeding, infection, and damage to surrounding organs and structures.  We discussed the possibility of mesh placement if the repair is under any tension.  We discussed the postoperative liquid diet.  The patient expressed excellent understanding of the procedure, its risks, and its limitations.  All questions were answered and the patent agrees to proceed with surgery. \par

## 2023-06-21 ENCOUNTER — OUTPATIENT (OUTPATIENT)
Dept: OUTPATIENT SERVICES | Facility: HOSPITAL | Age: 78
LOS: 1 days | End: 2023-06-21
Payer: MEDICARE

## 2023-06-21 VITALS
RESPIRATION RATE: 18 BRPM | SYSTOLIC BLOOD PRESSURE: 116 MMHG | TEMPERATURE: 98 F | HEART RATE: 97 BPM | WEIGHT: 145.06 LBS | DIASTOLIC BLOOD PRESSURE: 83 MMHG | HEIGHT: 62 IN | OXYGEN SATURATION: 96 %

## 2023-06-21 DIAGNOSIS — Z98.890 OTHER SPECIFIED POSTPROCEDURAL STATES: Chronic | ICD-10-CM

## 2023-06-21 DIAGNOSIS — Z01.818 ENCOUNTER FOR OTHER PREPROCEDURAL EXAMINATION: ICD-10-CM

## 2023-06-21 DIAGNOSIS — Z96.641 PRESENCE OF RIGHT ARTIFICIAL HIP JOINT: Chronic | ICD-10-CM

## 2023-06-21 DIAGNOSIS — K44.9 DIAPHRAGMATIC HERNIA WITHOUT OBSTRUCTION OR GANGRENE: ICD-10-CM

## 2023-06-21 DIAGNOSIS — Z90.49 ACQUIRED ABSENCE OF OTHER SPECIFIED PARTS OF DIGESTIVE TRACT: Chronic | ICD-10-CM

## 2023-06-21 LAB
A1C WITH ESTIMATED AVERAGE GLUCOSE RESULT: 5.6 % — SIGNIFICANT CHANGE UP (ref 4–5.6)
ALBUMIN SERPL ELPH-MCNC: 4.1 G/DL — SIGNIFICANT CHANGE UP (ref 3.3–5)
ALP SERPL-CCNC: 79 U/L — SIGNIFICANT CHANGE UP (ref 40–120)
ALT FLD-CCNC: 11 U/L — SIGNIFICANT CHANGE UP (ref 10–45)
ANION GAP SERPL CALC-SCNC: 11 MMOL/L — SIGNIFICANT CHANGE UP (ref 5–17)
AST SERPL-CCNC: 17 U/L — SIGNIFICANT CHANGE UP (ref 10–40)
BILIRUB SERPL-MCNC: 0.4 MG/DL — SIGNIFICANT CHANGE UP (ref 0.2–1.2)
BLD GP AB SCN SERPL QL: NEGATIVE — SIGNIFICANT CHANGE UP
BUN SERPL-MCNC: 16 MG/DL — SIGNIFICANT CHANGE UP (ref 7–23)
CALCIUM SERPL-MCNC: 9.3 MG/DL — SIGNIFICANT CHANGE UP (ref 8.4–10.5)
CHLORIDE SERPL-SCNC: 103 MMOL/L — SIGNIFICANT CHANGE UP (ref 96–108)
CO2 SERPL-SCNC: 25 MMOL/L — SIGNIFICANT CHANGE UP (ref 22–31)
CREAT SERPL-MCNC: 1.17 MG/DL — SIGNIFICANT CHANGE UP (ref 0.5–1.3)
EGFR: 49 ML/MIN/1.73M2 — LOW
ESTIMATED AVERAGE GLUCOSE: 114 MG/DL — SIGNIFICANT CHANGE UP (ref 68–114)
GLUCOSE SERPL-MCNC: 109 MG/DL — HIGH (ref 70–99)
HCT VFR BLD CALC: 40.8 % — SIGNIFICANT CHANGE UP (ref 34.5–45)
HGB BLD-MCNC: 12.9 G/DL — SIGNIFICANT CHANGE UP (ref 11.5–15.5)
MCHC RBC-ENTMCNC: 31.6 GM/DL — LOW (ref 32–36)
MCHC RBC-ENTMCNC: 33.4 PG — SIGNIFICANT CHANGE UP (ref 27–34)
MCV RBC AUTO: 105.7 FL — HIGH (ref 80–100)
NRBC # BLD: 0 /100 WBCS — SIGNIFICANT CHANGE UP (ref 0–0)
PLATELET # BLD AUTO: 390 K/UL — SIGNIFICANT CHANGE UP (ref 150–400)
POTASSIUM SERPL-MCNC: 4.1 MMOL/L — SIGNIFICANT CHANGE UP (ref 3.5–5.3)
POTASSIUM SERPL-SCNC: 4.1 MMOL/L — SIGNIFICANT CHANGE UP (ref 3.5–5.3)
PROT SERPL-MCNC: 7 G/DL — SIGNIFICANT CHANGE UP (ref 6–8.3)
RBC # BLD: 3.86 M/UL — SIGNIFICANT CHANGE UP (ref 3.8–5.2)
RBC # FLD: 14.5 % — SIGNIFICANT CHANGE UP (ref 10.3–14.5)
RH IG SCN BLD-IMP: POSITIVE — SIGNIFICANT CHANGE UP
SODIUM SERPL-SCNC: 139 MMOL/L — SIGNIFICANT CHANGE UP (ref 135–145)
WBC # BLD: 6.6 K/UL — SIGNIFICANT CHANGE UP (ref 3.8–10.5)
WBC # FLD AUTO: 6.6 K/UL — SIGNIFICANT CHANGE UP (ref 3.8–10.5)

## 2023-06-21 PROCEDURE — 86901 BLOOD TYPING SEROLOGIC RH(D): CPT

## 2023-06-21 PROCEDURE — 86900 BLOOD TYPING SEROLOGIC ABO: CPT

## 2023-06-21 PROCEDURE — 86850 RBC ANTIBODY SCREEN: CPT

## 2023-06-21 PROCEDURE — 87640 STAPH A DNA AMP PROBE: CPT

## 2023-06-21 PROCEDURE — 80053 COMPREHEN METABOLIC PANEL: CPT

## 2023-06-21 PROCEDURE — 83036 HEMOGLOBIN GLYCOSYLATED A1C: CPT

## 2023-06-21 PROCEDURE — 85027 COMPLETE CBC AUTOMATED: CPT

## 2023-06-21 PROCEDURE — 87641 MR-STAPH DNA AMP PROBE: CPT

## 2023-06-21 PROCEDURE — G0463: CPT

## 2023-06-21 NOTE — H&P PST ADULT - HISTORY OF PRESENT ILLNESS
This is a 78 y/o female PMH GERD, BHAKTI, paraesophageal hernia.  Presents today for  This is a 76 y/o female PMH GERD, anemia, last transfusion a year ago, BHAKTI, paraesophageal hernia.  Presents today for laparoscopic repair of paraesophageal.  Presents today for laparoscopic repair of paraesophageal hernia.      No recent COVID infection This is a 76 y/o female PMH GERD, bronchiectasis, anemia, last transfusion a year ago, BHAKTI, does not require CPAP, paraesophageal hernia.  Presents today for laparoscopic repair of paraesophageal hernia.  Presents today for laparoscopic repair of paraesophageal hernia.      No recent COVID infection

## 2023-06-21 NOTE — H&P PST ADULT - NSICDXPASTSURGICALHX_GEN_ALL_CORE_FT
PAST SURGICAL HISTORY:  History of bunionectomy of both great toes     History of total hip replacement, right     S/P LASIK surgery left eye    Status post appendectomy      PAST SURGICAL HISTORY:  History of bunionectomy of both great toes     History of total hip replacement, right     S/P laminectomy     S/P LASIK surgery left eye    Status post appendectomy

## 2023-06-21 NOTE — H&P PST ADULT - PROBLEM SELECTOR PLAN 1
Laparoscopic repair of paraesophageal hernia Laparoscopic repair of paraesophageal hernia.  Dr. Carr's note does not state any other bariatric surgery to be performed along with hernia repair, this was confirmed with his office, spoke with coordinator because patient was instructed to drink clear liquids prior to surgery. Laparoscopic repair of paraesophageal hernia.  Dr. Carr's note does not state any other bariatric surgery to be performed along with hernia repair, only paraesophageal hernia was discussed with patient and this was confirmed with his office, spoke with coordinator because patient was instructed to drink clear liquids prior to surgery.

## 2023-06-21 NOTE — H&P PST ADULT - NSICDXPASTMEDICALHX_GEN_ALL_CORE_FT
PAST MEDICAL HISTORY:  Anemia multiple iron infusions, blood transfusions    Cataract     Cervical spinal stenosis     Diverticulosis     GERD (gastroesophageal reflux disease)     H/O bronchitis     Hiatal hernia     History of bunionectomy of both great toes     Hypothyroid     Low back pain chronic    Lumbar spinal stenosis     BHAKTI (obstructive sleep apnea) wears mouth guard    Seasonal allergies      PAST MEDICAL HISTORY:  Anemia multiple iron infusions, blood transfusions    Cataract     Cervical spinal stenosis     Diverticulosis     GERD (gastroesophageal reflux disease)     Hiatal hernia     History of bunionectomy of both great toes     Hypothyroid     Low back pain chronic    Lumbar spinal stenosis     BHAKTI (obstructive sleep apnea) wears mouth guard    Seasonal allergies

## 2023-06-21 NOTE — H&P PST ADULT - ASSESSMENT
DASI score  DASI score 5.72 able to climb a flight of stairs, can walk 2 blocks and light to moderate housework.  No loose, broken or removable teeth

## 2023-06-22 LAB
MRSA PCR RESULT.: SIGNIFICANT CHANGE UP
S AUREUS DNA NOSE QL NAA+PROBE: SIGNIFICANT CHANGE UP

## 2023-06-23 ENCOUNTER — APPOINTMENT (OUTPATIENT)
Dept: SURGERY | Facility: CLINIC | Age: 78
End: 2023-06-23
Payer: MEDICARE

## 2023-06-23 VITALS
RESPIRATION RATE: 16 BRPM | HEART RATE: 90 BPM | TEMPERATURE: 96.7 F | OXYGEN SATURATION: 96 % | HEIGHT: 62 IN | SYSTOLIC BLOOD PRESSURE: 130 MMHG | DIASTOLIC BLOOD PRESSURE: 78 MMHG

## 2023-06-23 PROCEDURE — 99215 OFFICE O/P EST HI 40 MIN: CPT

## 2023-06-23 NOTE — HISTORY OF PRESENT ILLNESS
[de-identified] : LUCY is a 77 year old female who presented  for a reconsult for a possible hiatal. Is scheduled for 6-27-23. \par \par Last seen 4/24/23 - 77-year-old female with moderate to large hiatal hernia\par 77-year-old female here for talk prior to repair of large type III hiatal hernia on July 11 of this year.  She currently is doing well

## 2023-06-23 NOTE — ASSESSMENT
[FreeTextEntry1] : 77-year-old female here for talk prior to surgery on July 11 for a large type III hiatal hernia the risk benefits and expectations were discussed at length with the patient again The risk and benefits of the procedure were discussed with patient. The risk including, but not limited to, risk of bleeding, infection, recurrence, injury to other organs (nerves, esophagus, stomach, liver, etc), bloating, wrap being too tight and requiring reoperation or other interventions, delayed gastric emptying and persistent reflux. Several general anesthesia risks like MI, Stroke, respiratory complications and death were discussed. Need for modified diet postoperatively was discussed. All questions were answered and education materials were provided.\par All of her questions were answered\par \par \par

## 2023-06-23 NOTE — PHYSICAL EXAM
[JVD] : no jugular venous distention  [Normal Breath Sounds] : Normal breath sounds [Normal Heart Sounds] : normal heart sounds [Abdominal Masses] : No abdominal masses [Abdomen Tenderness] : ~T ~M No abdominal tenderness [No HSM] : no hepatosplenomegaly [Tender] : was nontender [No Rash or Lesion] : No rash or lesion [Calm] : calm [de-identified] : Ambulating with a cane [de-identified] : Within normal limits nonicteric [de-identified] : Soft nontender nondistended [de-identified] : Limited range of motion due to orthopedic issues [de-identified] : Cranial nerves II through XII grossly intact

## 2023-07-18 ENCOUNTER — APPOINTMENT (OUTPATIENT)
Dept: THORACIC SURGERY | Facility: CLINIC | Age: 78
End: 2023-07-18
Payer: MEDICARE

## 2023-07-18 VITALS
HEIGHT: 62 IN | SYSTOLIC BLOOD PRESSURE: 117 MMHG | WEIGHT: 144 LBS | OXYGEN SATURATION: 92 % | BODY MASS INDEX: 26.5 KG/M2 | DIASTOLIC BLOOD PRESSURE: 69 MMHG | HEART RATE: 87 BPM

## 2023-07-18 DIAGNOSIS — Z86.39 PERSONAL HISTORY OF OTHER ENDOCRINE, NUTRITIONAL AND METABOLIC DISEASE: ICD-10-CM

## 2023-07-18 DIAGNOSIS — M19.90 UNSPECIFIED OSTEOARTHRITIS, UNSPECIFIED SITE: ICD-10-CM

## 2023-07-18 DIAGNOSIS — Z86.2 PERSONAL HISTORY OF DISEASES OF THE BLOOD AND BLOOD-FORMING ORGANS AND CERTAIN DISORDERS INVOLVING THE IMMUNE MECHANISM: ICD-10-CM

## 2023-07-18 DIAGNOSIS — Z87.09 PERSONAL HISTORY OF OTHER DISEASES OF THE RESPIRATORY SYSTEM: ICD-10-CM

## 2023-07-18 DIAGNOSIS — K44.9 DIAPHRAGMATIC HERNIA W/OUT OBSTRUCTION OR GANGRENE: ICD-10-CM

## 2023-07-18 PROCEDURE — 99204 OFFICE O/P NEW MOD 45 MIN: CPT

## 2023-07-19 ENCOUNTER — NON-APPOINTMENT (OUTPATIENT)
Age: 78
End: 2023-07-19

## 2023-07-21 PROBLEM — K44.9 HIATAL HERNIA: Status: ACTIVE | Noted: 2022-02-28

## 2023-07-21 PROBLEM — Z86.2 HISTORY OF ANEMIA: Status: RESOLVED | Noted: 2023-07-21 | Resolved: 2023-07-21

## 2023-07-21 PROBLEM — Z86.39 HISTORY OF HYPOTHYROIDISM: Status: RESOLVED | Noted: 2023-07-21 | Resolved: 2023-07-21

## 2023-07-21 PROBLEM — Z87.09 HISTORY OF CHRONIC OBSTRUCTIVE LUNG DISEASE: Status: RESOLVED | Noted: 2023-07-21 | Resolved: 2023-07-21

## 2023-07-21 PROBLEM — M19.90 OSTEOARTHRITIS: Status: RESOLVED | Noted: 2023-07-21 | Resolved: 2023-07-21

## 2023-07-21 NOTE — HISTORY OF PRESENT ILLNESS
[FreeTextEntry1] : Ms. LUCY MCDNAIEL, 77 year old female, never a smoker, w/ hx of anemia, COPD, hypothyroidism, Osteoarthritis. Now w/ large type III hiatal hernia. Follows with Dr. Carr and scheduled for repair in August. \par \par Here today for second opinion regarding Hiatal hernia. Self Referred\par \par CT abdomen pelvis w/o IV contrast on 8/18/21: \par - No acute abdominal or pelvic abnormality\par - Left sided colonic diverticulosis\par - Unchanged, large Hiatal Hernia \par \par EGD w/ biopsy on 5/8/2023 w/ Dr. Kye Carr: Mild reflux esophagitis was found in the lower third of the esophagus and distal esophagus. Esophagitis was LA class A. Medium sized Hiatal Hernia was noted. \par Path: \par Duodenum proximal biopsy: chronic duodenitis w/ gastric foveolar metaplasia\par Stomach antrum biopsy: mild inactive chronic gastritis\par Stomach body biopsy: Gastric oxyntic gland mucosa with reactive changes\par Stomach fundus erosion biopsy: Erosive gastropathy\par Distal Esophagus biopsy: Active chronic nonspecific carditis; Negative for intestinal metaplasia and dysplasia\par \par Patient presents today for consultation. + Reflux; Symptoms better after weight loss and with diet modification. No dysphagia. Today, patient denies worsening SOB, chest pain, cough, hemoptysis, fever, chills, night sweats, lightheadedness or dizziness.\par

## 2023-07-21 NOTE — PHYSICAL EXAM
[Restricted in physically strenuous activity but ambulatory and able to carry out work of a light or sedentary nature] : Status 1- Restricted in physically strenuous activity but ambulatory and able to carry out work of a light or sedentary nature, e.g., light house work, office work [General Appearance - Alert] : alert [General Appearance - In No Acute Distress] : in no acute distress [General Appearance - Well Nourished] : well nourished [Sclera] : the sclera and conjunctiva were normal [Outer Ear] : the ears and nose were normal in appearance [Hearing Threshold Finger Rub Not Candler] : hearing was normal [Neck Appearance] : the appearance of the neck was normal [Neck Cervical Mass (___cm)] : no neck mass was observed [Jugular Venous Distention Increased] : there was no jugular-venous distention [] : no respiratory distress [Respiration, Rhythm And Depth] : normal respiratory rhythm and effort [Exaggerated Use Of Accessory Muscles For Inspiration] : no accessory muscle use [Auscultation Breath Sounds / Voice Sounds] : lungs were clear to auscultation bilaterally [Heart Rate And Rhythm] : heart rate was normal and rhythm regular [Examination Of The Chest] : the chest was normal in appearance [Chest Visual Inspection Thoracic Asymmetry] : no chest asymmetry [Diminished Respiratory Excursion] : normal chest expansion [2+] : left 2+ [Breast Appearance] : normal in appearance [Breast Palpation Mass] : no palpable masses [Bowel Sounds] : normal bowel sounds [Abdomen Soft] : soft [Abdomen Tenderness] : non-tender [Cervical Lymph Nodes Enlarged Posterior Bilaterally] : posterior cervical [Cervical Lymph Nodes Enlarged Anterior Bilaterally] : anterior cervical [No CVA Tenderness] : no ~M costovertebral angle tenderness [Supraclavicular Lymph Nodes Enlarged Bilaterally] : supraclavicular [No Spinal Tenderness] : no spinal tenderness [Involuntary Movements] : no involuntary movements were seen [Skin Color & Pigmentation] : normal skin color and pigmentation [No Focal Deficits] : no focal deficits [Oriented To Time, Place, And Person] : oriented to person, place, and time [FreeTextEntry1] : Deferred

## 2023-07-21 NOTE — ASSESSMENT
[FreeTextEntry1] : Ms. LUCY MCDANIEL, 77 year old female, never a smoker, w/ hx of anemia, COPD, hypothyroidism, Osteoarthritis. Now w/ large type III hiatal hernia. Follows with Dr. Carr and scheduled for repair in August. \par \par Here today for second opinion regarding Hiatal hernia. Self Referred\par \par I have independently reviewed the medical records and imaging at the time of this office consultation, and discussed the following interpretations with the patient:\par - Discussed necessity of additional GI testing to dictate further treatment. Will obtain non contrast CT Chest and refer to Dr. Klein for BRVO and Mano testing. \par - Return to clinic once the above testing is completed to discuss results and further plan of care. \par \par Recommendations reviewed with patient during this office visit, and all questions answered; Patient instructed on the importance of follow up and verbalizes understanding.\par \par I, SONALI Little, personally performed the evaluation and management (E/M) services for this established patient. That E/M includes conducting the examination, assessing all new/exacerbated conditions, and establishing a new plan of care. Today, My ACP, Melida Zhang, was here to observe my evaluation and management services for this patient to be followed going forward.\par \par

## 2023-07-21 NOTE — DATA REVIEWED
[FreeTextEntry1] : Independently reviewed the following:\par - EGD w/ biopsy on 5/8/2023\par - CT abdomen pelvis w/o IV contrast on 8/18/21

## 2023-07-24 ENCOUNTER — NON-APPOINTMENT (OUTPATIENT)
Age: 78
End: 2023-07-24

## 2023-07-24 ENCOUNTER — APPOINTMENT (OUTPATIENT)
Dept: GASTROENTEROLOGY | Facility: CLINIC | Age: 78
End: 2023-07-24
Payer: MEDICARE

## 2023-07-24 DIAGNOSIS — K44.9 DIAPHRAGMATIC HERNIA W/OUT OBSTRUCTION OR GANGRENE: ICD-10-CM

## 2023-07-24 DIAGNOSIS — K21.9 GASTRO-ESOPHAGEAL REFLUX DISEASE W/OUT ESOPHAGITIS: ICD-10-CM

## 2023-07-24 PROCEDURE — 99442: CPT | Mod: 95

## 2023-07-26 PROBLEM — K44.9 DIAPHRAGMATIC HERNIA TYPE 3: Status: ACTIVE | Noted: 2022-02-28

## 2023-08-23 ENCOUNTER — APPOINTMENT (OUTPATIENT)
Dept: SURGERY | Facility: HOSPITAL | Age: 78
End: 2023-08-23

## 2024-01-17 ENCOUNTER — APPOINTMENT (OUTPATIENT)
Dept: ORTHOPEDIC SURGERY | Facility: CLINIC | Age: 79
End: 2024-01-17
Payer: MEDICARE

## 2024-01-17 PROCEDURE — 20610 DRAIN/INJ JOINT/BURSA W/O US: CPT | Mod: 50

## 2024-01-17 PROCEDURE — 73564 X-RAY EXAM KNEE 4 OR MORE: CPT | Mod: 50

## 2024-01-17 PROCEDURE — 99213 OFFICE O/P EST LOW 20 MIN: CPT | Mod: 25

## 2024-01-22 ENCOUNTER — OUTPATIENT (OUTPATIENT)
Dept: OUTPATIENT SERVICES | Facility: HOSPITAL | Age: 79
LOS: 1 days | End: 2024-01-22
Payer: MEDICARE

## 2024-01-22 ENCOUNTER — APPOINTMENT (OUTPATIENT)
Dept: ULTRASOUND IMAGING | Facility: IMAGING CENTER | Age: 79
End: 2024-01-22
Payer: MEDICARE

## 2024-01-22 ENCOUNTER — APPOINTMENT (OUTPATIENT)
Dept: MAMMOGRAPHY | Facility: IMAGING CENTER | Age: 79
End: 2024-01-22
Payer: MEDICARE

## 2024-01-22 DIAGNOSIS — Z98.890 OTHER SPECIFIED POSTPROCEDURAL STATES: Chronic | ICD-10-CM

## 2024-01-22 DIAGNOSIS — Z00.8 ENCOUNTER FOR OTHER GENERAL EXAMINATION: ICD-10-CM

## 2024-01-22 DIAGNOSIS — Z96.641 PRESENCE OF RIGHT ARTIFICIAL HIP JOINT: Chronic | ICD-10-CM

## 2024-01-22 DIAGNOSIS — Z90.49 ACQUIRED ABSENCE OF OTHER SPECIFIED PARTS OF DIGESTIVE TRACT: Chronic | ICD-10-CM

## 2024-01-22 PROCEDURE — 77063 BREAST TOMOSYNTHESIS BI: CPT

## 2024-01-22 PROCEDURE — 76641 ULTRASOUND BREAST COMPLETE: CPT

## 2024-01-22 PROCEDURE — 77067 SCR MAMMO BI INCL CAD: CPT | Mod: 26

## 2024-01-22 PROCEDURE — 76641 ULTRASOUND BREAST COMPLETE: CPT | Mod: 26,50,GY

## 2024-01-22 PROCEDURE — 77063 BREAST TOMOSYNTHESIS BI: CPT | Mod: 26

## 2024-01-22 PROCEDURE — 77067 SCR MAMMO BI INCL CAD: CPT

## 2024-01-29 ENCOUNTER — APPOINTMENT (OUTPATIENT)
Dept: MAMMOGRAPHY | Facility: IMAGING CENTER | Age: 79
End: 2024-01-29
Payer: MEDICARE

## 2024-01-29 ENCOUNTER — OUTPATIENT (OUTPATIENT)
Dept: OUTPATIENT SERVICES | Facility: HOSPITAL | Age: 79
LOS: 1 days | End: 2024-01-29
Payer: MEDICARE

## 2024-01-29 DIAGNOSIS — Z98.890 OTHER SPECIFIED POSTPROCEDURAL STATES: Chronic | ICD-10-CM

## 2024-01-29 DIAGNOSIS — Z96.641 PRESENCE OF RIGHT ARTIFICIAL HIP JOINT: Chronic | ICD-10-CM

## 2024-01-29 DIAGNOSIS — Z90.49 ACQUIRED ABSENCE OF OTHER SPECIFIED PARTS OF DIGESTIVE TRACT: Chronic | ICD-10-CM

## 2024-01-29 DIAGNOSIS — Z00.8 ENCOUNTER FOR OTHER GENERAL EXAMINATION: ICD-10-CM

## 2024-01-29 PROCEDURE — G0279: CPT | Mod: 26

## 2024-01-29 PROCEDURE — 77066 DX MAMMO INCL CAD BI: CPT

## 2024-01-29 PROCEDURE — G0279: CPT

## 2024-01-29 PROCEDURE — 77066 DX MAMMO INCL CAD BI: CPT | Mod: 26

## 2024-01-31 ENCOUNTER — APPOINTMENT (OUTPATIENT)
Dept: ORTHOPEDIC SURGERY | Facility: CLINIC | Age: 79
End: 2024-01-31
Payer: MEDICARE

## 2024-01-31 DIAGNOSIS — M17.12 UNILATERAL PRIMARY OSTEOARTHRITIS, LEFT KNEE: ICD-10-CM

## 2024-01-31 PROCEDURE — 99215 OFFICE O/P EST HI 40 MIN: CPT | Mod: 25

## 2024-01-31 PROCEDURE — 20610 DRAIN/INJ JOINT/BURSA W/O US: CPT | Mod: RT

## 2024-01-31 NOTE — HISTORY OF PRESENT ILLNESS
[de-identified] : 01/31/2024: LUCY MCDANIEL is a 78 year y.o. female here today for right knee pain. Pt reports ongoing knee pain for years that worsened over time. She was diagnosed with arthritis in bilateral knees by Dr. Newell and is considering surgery. Pt is also here today for injection #2 of Euflexxa. Had CSI in the past w/o major relief.

## 2024-01-31 NOTE — IMAGING
[de-identified] : Constitutional: well developed and well nourished, able to communicate Cardiovascular: Peripheral vascular exam is grossly normal Neurologic: Alert and oriented, no acute distress. Skin: normal skin with no ulcers, rashes, or lesions Pulmonary: No respiratory distress, breathing comfortably on room air Lymphatics: No obvious lymphadenopathy or lymphedema in areas examined  RIGHT KNEE EXAM Alignment: Neutral  Effusion: None Atrophy: None                                                  Stable to Varus/valgus stress Posterior Drawer Test: negative Anterior Drawer Test: Negative Knee Extension/Flexion: 0 / 120  Medial/lateral compartments Medial joint line: No Tenderness Lateral joint line: POS Tenderness Kishore test: negative  Patellofemoral joint Medial patellar facet: no tenderness Patellar grind: Negative  Tendons: Pes Anserine: No tenderness Gerdys Tubercle/ IT Band: No tenderness Quadriceps Tendon: No Tenderness patellar tendon: no Tenderness Tibial tubercle: not tenderness Calf: no Tenderness  Neurovascular exam Muscle strength: 5/5 Sensation to light touch: intact Distal pulses: 2+  [id number] Xrays of the Right Knee were taken demonstrating tricompartmental arthritis with joint space collapse, osteophytes, and sclerosis

## 2024-01-31 NOTE — ASSESSMENT
[FreeTextEntry1] : 78 year F with bilateral knee OA Euflexxa 2 of 3 today, will need fu with Dr. Newell for last injection RTO in 4 weeks for fu to determine if she would like to proceed with TKA     Time Based billin minutes was spent with the patient today taking the patient's history, conducting a physical examination, reviewing imaging studies, and  detailed discussion regarding the diagnosis and treatment plan.

## 2024-01-31 NOTE — PROCEDURE
[FreeTextEntry3] : The risks, benefits and alternatives to the injection were discussed with the patient. The decision was made to proceed with the injection to reduce inflammation within the area. Verbal consent was obtained for the procedure. The Right knee was cleaned with alcohol and ethyl chloride was sprayed topically. Ultrasound was used to identify the suprapatellar bursa and visualized the location of the needle. Euflexxa 2 of 3 was injected. The patient tolerated the procedure well and was instructed to ice the affected joint as needed later today. Activities can be performed as tolerated.

## 2024-02-06 ENCOUNTER — APPOINTMENT (OUTPATIENT)
Dept: ORTHOPEDIC SURGERY | Facility: CLINIC | Age: 79
End: 2024-02-06
Payer: MEDICARE

## 2024-02-06 DIAGNOSIS — M17.11 UNILATERAL PRIMARY OSTEOARTHRITIS, RIGHT KNEE: ICD-10-CM

## 2024-02-06 PROCEDURE — 20611 DRAIN/INJ JOINT/BURSA W/US: CPT | Mod: RT

## 2024-02-06 PROCEDURE — 99203 OFFICE O/P NEW LOW 30 MIN: CPT | Mod: 25

## 2024-02-06 NOTE — IMAGING
[de-identified] :  R knee: - No obvious deformity or swelling - No pain with palpation of medial or lateral joint line. - ROM from 120 degrees of flexion to 0 degrees extension. - 5/5 strength with knee flexion and extension - Stable varus, valgus testing - Distally neurovascularly intact.    [Bilateral] : knee bilaterally [There are no fractures, subluxations or dislocations. No significant abnormalities are seen] : There are no fractures, subluxations or dislocations. No significant abnormalities are seen [Moderate tricompartmental OA medial narrowing] : Moderate tricompartmental OA medial narrowing [Moderate tricompartmental OA lateral narrowing] : Moderate tricompartmental OA lateral narrowing [Moderate patellofemoral OA] : Moderate patellofemoral OA

## 2024-02-06 NOTE — PROCEDURE
[FreeTextEntry3] : A pre-procedure verification was performed by asking the patient to state their name, , and the location of the procedure being performed in their own words.  A review of allergies was accomplished through dialog and the patient, ending thus in the full agreement. The risks and benefits were explained and consent were obtained verbally.  Procedure: Ultrasound Guided Injection >>  R knee Consent was obtained. Patients questions were answered to the best of my ability prior to procedure. Injection site and surrounding bony landmarks were palpated, and marked prior to proceeding. Target location and needle pathway identified under ultrasound. Ultrasound probe was sanitized in the typical fashion prior to application of sterile gel. Injection site was cleaned and prepped in the typical fashion using alcohol swabs. Cold spray used on skin for patient comfort. Site was identified once again with ultrasound. Needle was advanced into the right knee space from the lateral suprapatellar approach. A vial of Euflexxa was then injected into the knee. Patient tolerated the procedure well, without significant complications. Minimal (<3cc) blood loss experienced. Images were saved into patient's chart.

## 2024-02-06 NOTE — HISTORY OF PRESENT ILLNESS
[de-identified] : 02/06/2024: LUCY MCDANIEL is a 78 year y.o. female here today for right knee pain. Pt reports ongoing knee pain for years that worsened over time. She was diagnosed with arthritis in bilateral knees by Dr. Newell and is considering surgery. Pt is also here today for injection #3 of Euflexxa. She reports improvement from the last injection.

## 2024-02-06 NOTE — ASSESSMENT
[FreeTextEntry1] : - US guided R knee Euflexxa injection #3 given, tolerated well - Discussed use of tylenol, ice as needed for injection site relief - Follow up as recommended per Dr. Llanes.

## 2024-02-15 ENCOUNTER — OUTPATIENT (OUTPATIENT)
Dept: OUTPATIENT SERVICES | Facility: HOSPITAL | Age: 79
LOS: 1 days | End: 2024-02-15
Payer: MEDICARE

## 2024-02-15 ENCOUNTER — APPOINTMENT (OUTPATIENT)
Dept: MAMMOGRAPHY | Facility: IMAGING CENTER | Age: 79
End: 2024-02-15
Payer: MEDICARE

## 2024-02-15 DIAGNOSIS — Z96.641 PRESENCE OF RIGHT ARTIFICIAL HIP JOINT: Chronic | ICD-10-CM

## 2024-02-15 DIAGNOSIS — Z98.890 OTHER SPECIFIED POSTPROCEDURAL STATES: Chronic | ICD-10-CM

## 2024-02-15 DIAGNOSIS — Z00.8 ENCOUNTER FOR OTHER GENERAL EXAMINATION: ICD-10-CM

## 2024-02-15 DIAGNOSIS — Z90.49 ACQUIRED ABSENCE OF OTHER SPECIFIED PARTS OF DIGESTIVE TRACT: Chronic | ICD-10-CM

## 2024-02-15 PROCEDURE — 77065 DX MAMMO INCL CAD UNI: CPT | Mod: 26,RT

## 2024-02-15 PROCEDURE — A4648: CPT

## 2024-02-15 PROCEDURE — 19081 BX BREAST 1ST LESION STRTCTC: CPT | Mod: RT

## 2024-02-15 PROCEDURE — 19081 BX BREAST 1ST LESION STRTCTC: CPT

## 2024-02-15 PROCEDURE — 77065 DX MAMMO INCL CAD UNI: CPT

## 2024-02-21 LAB — SURGICAL PATHOLOGY STUDY: SIGNIFICANT CHANGE UP

## 2024-03-04 ENCOUNTER — APPOINTMENT (OUTPATIENT)
Dept: ORTHOPEDIC SURGERY | Facility: CLINIC | Age: 79
End: 2024-03-04

## 2024-03-07 NOTE — HISTORY OF PRESENT ILLNESS
[Right Leg] : right leg [Dull/Aching] : dull/aching [Frequent] : frequent [Household chores] : household chores [Leisure] : leisure [Standing] : standing [Walking] : walking [Exercising] : exercising [Stairs] : stairs [2] : 2 [Euflexxa] : Euflexxa [de-identified] : 01/17/2024: LUCY MCDANIEL is a 78 year old female complaining of right knee pain. pt states since last visit 06/2023, pain increased. +Euflexxa #3 06/29/22. ambulating with cane  [] : no [FreeTextEntry5] : pt reports minimal pain  [de-identified] : 6/15/22 [de-identified] : RT knee

## 2024-03-07 NOTE — DISCUSSION/SUMMARY
[de-identified] : Assessment:   The patient presents with history, examination and imaging that are most consistent with a diagnosis of:  ARTHRITIS OF B/L KNEES   The patient would like to pursue conservative measures at this time. After consideration of various non-operative treatment modalities, the patient would like to proceed with the modalities listed below.   During this appointment the patient was examined, diagnoses were discussed and explained in a face to face manner. In addition extensive time was spent reviewing aforementioned diagnostic studies. Counseling including abnormal image results, differential diagnoses, treatment options, risk and benefits, lifestyle changes, current condition, and current medications was performed. Patient's comments, questions, and concerns were address and patient verbalized understanding.   ---------------------------     Plan: - Patient a candidate to start Euflexxa today. Inj #1 in B/L knees - trisha well - Refer to Dr. Llanes    Follow-up:  1 week for inj #2

## 2024-03-07 NOTE — PROCEDURE
[FreeTextEntry3] : The risks, benefits, and alternatives to viscosupplementation injection were reviewed with the patient. Risks outlined include but are not limited to infection, sepsis, bleeding, scarring, temporary increase in pain, syncopal episode, failure to resolve symptoms, symptoms recurrence, allergic reaction, flare reaction, pseudoseptic reaction.  Patient understood the risks and asked to proceed with this treatment course.  Large joint injection was performed of B/L knees. The indication for this procedure was pain, inflammation and x-ray evidence of Osteoarthritis on this or prior visit. The site was prepped with alcohol, betadine, ethyl chloride sprayed topically and sterile technique used. An injection of Euflexxa, series #[1] was used.  Patient tolerated procedure well. Patient was advised to call if redness, pain or fever occur and apply ice for 15 minutes out of every hour for the next 12-24 hours as tolerated.   Patient has tried OTC's including aspirin, Ibuprofen, Aleve, etc or prescription NSAIDS, and/or exercises at home and/or physical therapy without satisfactory response, patient had decreased mobility in the joint and the risks benefits, and alternatives have been discussed, and verbal consent was obtained.   Visualization of the needle and placement of injection was performed without complication. [Large Joint Injection] : Large joint injection [Bilateral] : bilaterally of the [Knee] : knee [Pain] : pain [X-ray evidence of Osteoarthritis on this or prior visit] : x-ray evidence of Osteoarthritis on this or prior visit [Inflammation] : inflammation [Alcohol] : alcohol [Betadine] : betadine [Ethyl Chloride sprayed topically] : ethyl chloride sprayed topically [Sterile technique used] : sterile technique used [Euflexxa(20mg)] : 20mg of Euflexxa [] : Patient tolerated procedure well [#1] : series #1 [Call if redness, pain or fever occur] : call if redness, pain or fever occur [Previous OTC use and PT nontherapeutic] : patient has tried OTC's including aspirin, Ibuprofen, Aleve, etc or prescription NSAIDS, and/or exercises at home and/or physical therapy without satisfactory response [Apply ice for 15min out of every hour for the next 12-24 hours as tolerated] : apply ice for 15 minutes out of every hour for the next 12-24 hours as tolerated [Patient had decreased mobility in the joint] : patient had decreased mobility in the joint [Risks, benefits, alternatives discussed / Verbal consent obtained] : the risks benefits, and alternatives have been discussed, and verbal consent was obtained

## 2024-03-07 NOTE — PHYSICAL EXAM
[de-identified] : The patient is a well appearing 76 year old female of their stated age.\par  Patient ambulates with a ANTALGIC gait.\par  Negative straight leg raise bilateral\par  \par  General: in no acute distress, seated comfortably, moving easily\par  Skin: No discoloration, rashes; on palpation skin is dry, \par  Neuro: Normal sensation all dermatomes, motor all myotomes\par  Vascular: Normal pulses, no edema, normal temperature\par  Coordination and balance: Normal\par  Psych: normal mood and affect, non pressured speech, alert and oriented x3\par  \par  Right Knee:                         	\par  ROM:  0-145 degrees\par  \par  Lachman: Negative\par  Pivot Shift: Negative\par  Anterior Drawer: Negative\par  Posterior Drawer / Sag: Negative\par  Varus Stress 0 degrees: Stable\par  Varus Stress 30 degrees: Stable\par  Valgus Stress 0 degrees: Stable\par  Valgus Stress 30 degrees: Stable\par  Medial Kishore: Positive\par  Lateral Kishore: Negative\par  Patella Glide: 2+\par  Patella Apprehension: Negative\par  Patella Grind: Positive\par  \par  Palpation:\par  Medial Joint Line: TTP\par  Lateral Joint Line: TTP\par  Medial Collateral Ligament: Nontender\par  Lateral Collateral Ligament/PLC: Nontender\par  Distal Femur: Nontender\par  Proximal Tibia: Nontender\par  Tibial Tubercle: Nontender\par  Distal Pole Patella: Nontender\par  Quadriceps Tendon: Nontender &  Intact\par  Patella Tendon: Nontender &  Intact\par  Medial Distal Hamstring/PES: Nontender\par  Lateral Distal Hamstring: Nontender & Stable\par  Iliotibial Band: Nontender\par  Medial Patellofemoral Ligament: Nontender\par  Adductor: Nontender\par  Proximal GSC-Plantaris: Nontender\par  Calf: Supple & Nontender\par  \par  Inspection:\par  Deformity: No\par  Erythema: No\par  Ecchymosis: No\par  Abrasions: No\par  Effusion: Moderate\par  Prepatellar Bursitis: No\par  \par  Neurologic Exam:\par  Sensation L4-S1: Grossly Intact\par  \par  Motor Exam:\par  Quadriceps: 5 out of 5\par  Hamstrings: 5 out of 5\par  EHL: 5 out of 5\par  FHL: 5 out of 5\par  TA: 5 out of 5\par  GS: 5 out of 5\par  \par  Circulatory/Pulses:\par  Dorsalis Pedis: 2+\par  Posterior Tibialis: 2+\par  \par  Additional Pertinent Findings: None\par  \par  Left Knee:                           	\par  ROM:  0-145 degrees\par  \par  Lachman: Negative\par  Pivot Shift: Negative\par  Anterior Drawer: Negative\par  Posterior Drawer / Sag: Negative\par  Varus Stress 0 degrees: Stable\par  Varus Stress 30 degrees: Stable\par  Valgus Stress 0 degrees: Stable\par  Valgus Stress 30 degrees: Stable\par  Medial Kishore: Positive\par  Lateral Kishore: Negative\par  Patella Glide: 2+\par  Patella Apprehension: Negative\par  Patella Grind: Positive\par  \par  Palpation:\par  Medial Joint Line: TTP\par  Lateral Joint Line: TTP\par  Medial Collateral Ligament: Nontender\par  Lateral Collateral Ligament/PLC: Nontender\par  Distal Femur: Nontender\par  Proximal Tibia: Nontender\par  Tibial Tubercle: Nontender\par  Distal Pole Patella: Nontender\par  Quadriceps Tendon: Nontender &  Intact\par  Patella Tendon: Nontender &  Intact\par  Medial Distal Hamstring/PES: Nontender\par  Lateral Distal Hamstring: Nontender & Stable\par  Iliotibial Band: Nontender\par  Medial Patellofemoral Ligament: Nontender\par  Adductor: Nontender\par  Proximal GSC-Plantaris: Nontender\par  Calf: Supple & Nontender\par  \par  Inspection:\par  Deformity: No\par  Erythema: No\par  Ecchymosis: No\par  Abrasions: No\par  Effusion: Moderate\par  Prepatellar Bursitis: No\par  \par  Neurologic Exam:\par  Sensation L4-S1: Grossly Intact\par  \par  Motor Exam:\par  Quadriceps: 5 out of 5\par  Hamstrings: 5 out of 5\par  EHL: 5 out of 5\par  FHL: 5 out of 5\par  TA: 5 out of 5\par  GS: 5 out of 5\par  \par  Circulatory/Pulses:\par  Dorsalis Pedis: 2+\par  Posterior Tibialis: 2+

## 2024-04-22 NOTE — H&P PST ADULT - NSICDXPROBLEM_GEN_ALL_CORE_FT
Defer fluid needs to team.  Estimated nutrient needs based on IBW 130lb with consideration for skin integrity and age PROBLEM DIAGNOSES  Problem: Unilateral primary osteoarthritis, left hip  Assessment and Plan: Scheduled for left total hip replacement direct anterior approach on 03/16/2020. Pre op instructions, famotidine, chlorhexidine gluconate soap given and explained. Pt verbalized understanding.     Problem: BHAKTI (obstructive sleep apnea)  Assessment and Plan: OR booking notified via fax    Problem: Contrast media allergy  Assessment and Plan: OR booking notified via fax

## 2024-07-15 NOTE — PHYSICAL THERAPY INITIAL EVALUATION ADULT - IMPAIRMENTS CONTRIBUTING TO GAIT DEVIATIONS, PT EVAL
Detail Level: Detailed Show Applicator Variable?: Yes Number Of Freeze-Thaw Cycles: 3 freeze-thaw cycles Duration Of Freeze Thaw-Cycle (Seconds): 10 Post-Care Instructions: I reviewed with the patient in detail post-care instructions. Patient is to wear sunprotection, and avoid picking at any of the treated lesions. Pt may apply Vaseline to crusted or scabbing areas. Consent: The patient's consent was obtained including but not limited to risks of crusting, scabbing, blistering, scarring, darker or lighter pigmentary change, recurrence, incomplete removal and infection. decreased strength/impaired balance

## 2025-02-23 NOTE — H&P PST ADULT - FUNCTIONAL SCREEN CURRENT LEVEL: TOILETING, MLM
L  nonhealing  2nd toe wound x 1 week ago.   Saw a podiatrist, with negative foot xrays. Prescribed antibiotics without improvement.  PMHx DM 0 = independent

## 2025-05-02 NOTE — PATIENT PROFILE ADULT - NSPROMEDSBROUGHTTOHOSP_GEN_A_NUR
Sabina Rios (:  1964) is a 60 y.o. female,Established patient, here for evaluation of the following chief complaint(s):  Annual Exam (Physical exam- patient denies any complaints at this time)      ASSESSMENT/PLAN:  1. Physical exam  Assessment & Plan:    Digna presents today for her well exam.  Reviewed recent medical history, vitals, labs, medications.  Overall she has been doing better since recent colon surgery.  Continue to monitor your diet and stay well-hydrated  Up-to-date mammogram and colonoscopy.  Plan for Pap smear  Follow-up 1 year  2. Irritable bowel syndrome with constipation  Assessment & Plan:   Chronic, at goal (stable), following with Dr. Quezada  3. Senile osteoporosis  Assessment & Plan:    Stable, continues prolia. CMP reviewed, calcium level stable        Return in about 1 year (around 2026) for yearly physical.    SUBJECTIVE/OBJECTIVE:  Digna presents today for annual physical examination.  She has been following with general surgery and GI for diverticulitis and irritable bowel syndrome.  She previously went through a resection of her descending colon in December.    Overall she has been doing better now.  Denies any acute concerns today    Blood pressure stable in office 116/84  She denies shortness of breath, chest pain, leg swelling.  Monitoring diet at home.  She has lost some weight most likely due to previous procedures and diverticulitis flares.    Up-to-date colonoscopy Date of last Colonoscopy: 2024   She continues to follow with Dr. Ramírez  Bowels slightly irregular, denies blood in stool.  She does have senna and amitiza as needed  Scar from abdomen has been healing well    Mood stable.  She continues on Wellbutrin to help with menopausal symptoms.  She is hoping eventually to wean off of this.  Continues with intermittent hot flashes    Up-to-date mammogram Date of last Mammogram: 2025   Due for Pap Date of last Cervical Cancer screen (HPV or PAP): 
no

## 2025-09-19 ENCOUNTER — APPOINTMENT (OUTPATIENT)
Dept: ORTHOPEDIC SURGERY | Facility: CLINIC | Age: 80
End: 2025-09-19
Payer: MEDICARE

## 2025-09-19 VITALS
WEIGHT: 145 LBS | HEIGHT: 60 IN | DIASTOLIC BLOOD PRESSURE: 80 MMHG | BODY MASS INDEX: 28.47 KG/M2 | SYSTOLIC BLOOD PRESSURE: 155 MMHG | HEART RATE: 87 BPM

## 2025-09-19 DIAGNOSIS — M47.812 SPONDYLOSIS W/OUT MYELOPATHY OR RADICULOPATHY, CERVICAL REGION: ICD-10-CM

## 2025-09-19 DIAGNOSIS — M54.12 RADICULOPATHY, CERVICAL REGION: ICD-10-CM

## 2025-09-19 PROCEDURE — 72040 X-RAY EXAM NECK SPINE 2-3 VW: CPT

## 2025-09-19 PROCEDURE — 99215 OFFICE O/P EST HI 40 MIN: CPT

## 2025-09-19 RX ORDER — ESOMEPRAZOLE MAGNESIUM 40 MG/1
40 CAPSULE, DELAYED RELEASE ORAL DAILY
Qty: 30 | Refills: 1 | Status: ACTIVE | COMMUNITY
Start: 2025-09-19 | End: 1900-01-01

## 2025-09-19 RX ORDER — IBUPROFEN 800 MG/1
800 TABLET, FILM COATED ORAL
Qty: 90 | Refills: 0 | Status: ACTIVE | COMMUNITY
Start: 2025-09-19 | End: 1900-01-01

## 2025-09-19 RX ORDER — FAMOTIDINE 20 MG/1
20 TABLET, FILM COATED ORAL TWICE DAILY
Qty: 60 | Refills: 1 | Status: ACTIVE | COMMUNITY
Start: 2025-09-19 | End: 1900-01-01